# Patient Record
Sex: MALE | Race: OTHER | Employment: UNEMPLOYED | URBAN - METROPOLITAN AREA
[De-identification: names, ages, dates, MRNs, and addresses within clinical notes are randomized per-mention and may not be internally consistent; named-entity substitution may affect disease eponyms.]

---

## 2018-02-05 ENCOUNTER — HOSPITAL ENCOUNTER (EMERGENCY)
Age: 38
Discharge: HOME | End: 2018-02-05
Attending: EMERGENCY MEDICINE
Payer: MEDICAID

## 2018-02-05 VITALS
DIASTOLIC BLOOD PRESSURE: 98 MMHG | SYSTOLIC BLOOD PRESSURE: 175 MMHG | OXYGEN SATURATION: 98 % | HEART RATE: 98 BPM | RESPIRATION RATE: 21 BRPM | TEMPERATURE: 98.8 F

## 2018-02-05 DIAGNOSIS — R52 BODY ACHES: ICD-10-CM

## 2018-02-05 DIAGNOSIS — I10 HYPERTENSION, UNSPECIFIED TYPE: ICD-10-CM

## 2018-02-05 DIAGNOSIS — J06.9 UPPER RESPIRATORY TRACT INFECTION, UNSPECIFIED TYPE: ICD-10-CM

## 2018-02-05 DIAGNOSIS — F17.200 SMOKER: ICD-10-CM

## 2018-02-05 DIAGNOSIS — J34.89 STUFFY AND RUNNY NOSE: Primary | ICD-10-CM

## 2018-02-05 PROCEDURE — 99283 EMERGENCY DEPT VISIT LOW MDM: CPT

## 2018-02-05 PROCEDURE — 6370000000 HC RX 637 (ALT 250 FOR IP): Performed by: EMERGENCY MEDICINE

## 2018-02-05 RX ORDER — IBUPROFEN 800 MG/1
800 TABLET ORAL ONCE
Status: COMPLETED | OUTPATIENT
Start: 2018-02-05 | End: 2018-02-05

## 2018-02-05 RX ORDER — IBUPROFEN 600 MG/1
600 TABLET ORAL EVERY 6 HOURS PRN
Qty: 30 TABLET | Refills: 0 | Status: SHIPPED | OUTPATIENT
Start: 2018-02-05 | End: 2018-05-18 | Stop reason: ALTCHOICE

## 2018-02-05 RX ORDER — ACETAMINOPHEN 325 MG/1
650 TABLET ORAL ONCE
Status: COMPLETED | OUTPATIENT
Start: 2018-02-05 | End: 2018-02-05

## 2018-02-05 RX ORDER — ACETAMINOPHEN 325 MG/1
650 TABLET ORAL EVERY 6 HOURS PRN
Qty: 30 TABLET | Refills: 0 | Status: ON HOLD | OUTPATIENT
Start: 2018-02-05 | End: 2021-03-03

## 2018-02-05 RX ORDER — ECHINACEA PURPUREA EXTRACT 125 MG
1 TABLET ORAL PRN
Qty: 1 BOTTLE | Refills: 0 | Status: ON HOLD | OUTPATIENT
Start: 2018-02-05 | End: 2021-03-03

## 2018-02-05 RX ORDER — OXYMETAZOLINE HYDROCHLORIDE 0.05 G/100ML
2 SPRAY NASAL 2 TIMES DAILY
Qty: 1 BOTTLE | Refills: 0 | Status: SHIPPED | OUTPATIENT
Start: 2018-02-05 | End: 2018-03-07

## 2018-02-05 RX ADMIN — ACETAMINOPHEN 650 MG: 325 TABLET ORAL at 20:24

## 2018-02-05 RX ADMIN — IBUPROFEN 800 MG: 800 TABLET ORAL at 20:25

## 2018-02-05 ASSESSMENT — PAIN DESCRIPTION - DESCRIPTORS: DESCRIPTORS: ACHING

## 2018-02-05 ASSESSMENT — PAIN SCALES - GENERAL
PAINLEVEL_OUTOF10: 5

## 2018-02-05 ASSESSMENT — PAIN DESCRIPTION - LOCATION: LOCATION: GENERALIZED

## 2018-02-06 NOTE — ED PROVIDER NOTES
[facility]  ED  Emergency Department Encounter  Emergency Medicine Resident     Pt Name: Verlene Lesches  MRN: [id number]  [place] 1980  Date of evaluation: 2/5/18  PCP:  Bladimir Riojas MD    [address] [place] [address]       Chief Complaint   Patient presents with    Generalized Body Aches     Since Friday    Cough    Nasal Congestion    Fatigue       HISTORY OF PRESENT ILLNESS  (Location/Symptom, Timing/Onset, Context/Setting, Quality, Duration, Modifying Factors, Severity.)      Verlene Lesches is a 40 y.o. male who presents with acute onset of a constant URI symptoms  for the past day. Nothing taken. Nothing else has aggravated or relieved symptoms. Moderate severity. Associated or not associated symptoms: (+ is present/positive, - is absent/negative)  Fever possible stated he was sweating while he was sleeping and does complain of some chills  Cough + that is slighlty productive    Stuffy and runny nose +  Sore Throat  slight  Runny Eyes  Matted eyes this morning  Vomiting   none  Diarrhea   none  Headache   +  Body Aches   +    PAST MEDICAL / SURGICAL / SOCIAL / FAMILY HISTORY      has a past medical history of Hypertension. Patient denies history of hypertension to me     has a past surgical history that includes Total shoulder arthroplasty and Knee arthroscopy. Social History     Social History    Marital status: Single     Spouse name: N/A    Number of children: N/A    Years of education: N/A     Occupational History    Not on file. Social History Main Topics    Smoking status: Current Some Day Smoker    Smokeless tobacco: Not on file    Alcohol use Yes    Drug use: No    Sexual activity: Not on file     Other Topics Concern    Not on file     Social History Narrative    No narrative on file       History reviewed. No pertinent family history. Allergies:  Review of patient's allergies indicates no known allergies.     Home Medications:  Prior to Admission call the primary care provider listed on your discharge instructions or a physician of your choice this week to arrange follow up for further evaluation of possible pre-hypertension or Hypertension. PROCEDURES:  None    CONSULTS:  IP CONSULT TO SOCIAL WORK    CRITICAL CARE:  None    FINAL IMPRESSION      1. Stuffy and runny nose    2. Hypertension, unspecified type    3. Smoker    4. Body aches    5. Upper respiratory tract infection, unspecified type          DISPOSITION / PLAN     DISPOSITION Decision To Discharge         PATIENT REFERRED TO:  No follow-up provider specified.     DISCHARGE MEDICATIONS:  New Prescriptions    ACETAMINOPHEN (TYLENOL) 325 MG TABLET    Take 2 tablets by mouth every 6 hours as needed for Pain    IBUPROFEN (ADVIL;MOTRIN) 600 MG TABLET    Take 1 tablet by mouth every 6 hours as needed for Pain    OXYMETAZOLINE (AFRIN 12 HOUR) 0.05 % NASAL SPRAY    2 sprays by Nasal route 2 times daily No more than three days    SODIUM CHLORIDE (OCEAN NASAL SPRAY) 0.65 % NASAL SPRAY    1 spray by Nasal route as needed for Margy Hernandez MD  Emergency Medicine Resident    (Please note that portions of this note were completed with a voice recognition program.  Efforts were made to edit the dictations but occasionally words are mis-transcribed.)        Silvina Rich MD  Resident  02/05/18 2010

## 2018-02-06 NOTE — ED PROVIDER NOTES
[facility]     Emergency Department     Faculty Attestation        I performed a history and physical examination of the patient and discussed management with the resident. I reviewed the residents note and agree with the documented findings and plan of care. Any areas of disagreement are noted on the chart. I was personally present for the key portions of any procedures. I have documented in the chart those procedures where I was not present during the key portions. I have reviewed the emergency nurses triage note. I agree with the chief complaint, past medical history, past surgical history, allergies, medications, social and family history as documented unless otherwise noted below. For Physician Assistant/ Nurse Practitioner cases/documentation I have personally evaluated this patient and have completed at least one if not all key elements of the E/M (history, physical exam, and MDM). Additional findings are as noted. Vital Signs: BP: (!) 175/98  Pulse: 98  Resp: 21  Temp: 98.8 °F (37.1 °C) SpO2: 98 %  PCP:  Azucena Dukes MD    Pertinent Comments:         Critical Care  None    Note, if the patient's blood pressure was elevated, and they have no history of hypertension, they were informed of the following: The patient may have Pre-hypertension/Hypertension: The patient has been informed that they may have pre-hypertension or Hypertension based on a blood pressure reading in the emergency department. I recommend that the patient call the primary care provider listed on their discharge instructions or a physician of their choice this week to arrange follow up for further evaluation of possible pre-hypertension or Hypertension. (Please note that portions of this note were completed with a voice recognition program. Efforts were made to edit the dictations but occasionally words are mis-transcribed.  Whenever words are used in this note in

## 2018-05-14 ENCOUNTER — TELEPHONE (OUTPATIENT)
Dept: FAMILY MEDICINE CLINIC | Age: 38
End: 2018-05-14

## 2018-05-15 ENCOUNTER — APPOINTMENT (OUTPATIENT)
Dept: ULTRASOUND IMAGING | Age: 38
End: 2018-05-15
Payer: MEDICAID

## 2018-05-15 ENCOUNTER — HOSPITAL ENCOUNTER (EMERGENCY)
Age: 38
Discharge: HOME | End: 2018-05-15
Attending: EMERGENCY MEDICINE
Payer: MEDICAID

## 2018-05-15 VITALS
HEART RATE: 88 BPM | HEIGHT: 72 IN | RESPIRATION RATE: 18 BRPM | OXYGEN SATURATION: 96 % | DIASTOLIC BLOOD PRESSURE: 117 MMHG | WEIGHT: 248 LBS | SYSTOLIC BLOOD PRESSURE: 179 MMHG | TEMPERATURE: 98.3 F | BODY MASS INDEX: 33.59 KG/M2

## 2018-05-15 DIAGNOSIS — I10 ESSENTIAL HYPERTENSION: ICD-10-CM

## 2018-05-15 DIAGNOSIS — R36.1 HEMATOSPERMIA: Primary | ICD-10-CM

## 2018-05-15 LAB
-: ABNORMAL
ABSOLUTE EOS #: 0.1 K/UL (ref 0–0.4)
ABSOLUTE IMMATURE GRANULOCYTE: ABNORMAL K/UL (ref 0–0.3)
ABSOLUTE LYMPH #: 2.7 K/UL (ref 1–4.8)
ABSOLUTE MONO #: 0.6 K/UL (ref 0.2–0.8)
AMORPHOUS: ABNORMAL
ANION GAP SERPL CALCULATED.3IONS-SCNC: 13 MMOL/L (ref 9–17)
BACTERIA: ABNORMAL
BASOPHILS # BLD: 1 % (ref 0–2)
BASOPHILS ABSOLUTE: 0 K/UL (ref 0–0.2)
BILIRUBIN URINE: NEGATIVE
BUN BLDV-MCNC: 5 MG/DL (ref 6–20)
BUN/CREAT BLD: 7 (ref 9–20)
CALCIUM SERPL-MCNC: 8.8 MG/DL (ref 8.6–10.4)
CASTS UA: ABNORMAL /LPF
CHLORIDE BLD-SCNC: 103 MMOL/L (ref 98–107)
CO2: 24 MMOL/L (ref 20–31)
COLOR: YELLOW
COMMENT UA: ABNORMAL
CREAT SERPL-MCNC: 0.73 MG/DL (ref 0.7–1.2)
CRYSTALS, UA: ABNORMAL /HPF
DIFFERENTIAL TYPE: ABNORMAL
EOSINOPHILS RELATIVE PERCENT: 1 % (ref 1–4)
EPITHELIAL CELLS UA: ABNORMAL /HPF (ref 0–5)
GFR AFRICAN AMERICAN: >60 ML/MIN
GFR NON-AFRICAN AMERICAN: >60 ML/MIN
GFR SERPL CREATININE-BSD FRML MDRD: ABNORMAL ML/MIN/{1.73_M2}
GFR SERPL CREATININE-BSD FRML MDRD: ABNORMAL ML/MIN/{1.73_M2}
GLUCOSE BLD-MCNC: 94 MG/DL (ref 70–99)
GLUCOSE URINE: NEGATIVE
HCT VFR BLD CALC: 49 % (ref 41–53)
HEMOGLOBIN: 15.7 G/DL (ref 13.5–17.5)
IMMATURE GRANULOCYTES: ABNORMAL %
KETONES, URINE: NEGATIVE
LEUKOCYTE ESTERASE, URINE: NEGATIVE
LYMPHOCYTES # BLD: 32 % (ref 24–44)
MCH RBC QN AUTO: 26.6 PG (ref 26–34)
MCHC RBC AUTO-ENTMCNC: 32.1 G/DL (ref 31–37)
MCV RBC AUTO: 83.1 FL (ref 80–100)
MONOCYTES # BLD: 7 % (ref 1–7)
MUCUS: ABNORMAL
NITRITE, URINE: NEGATIVE
NRBC AUTOMATED: ABNORMAL PER 100 WBC
OTHER OBSERVATIONS UA: ABNORMAL
PDW BLD-RTO: 15.2 % (ref 11.5–14.5)
PH UA: 6.5 (ref 5–8)
PLATELET # BLD: 239 K/UL (ref 130–400)
PLATELET ESTIMATE: ABNORMAL
PMV BLD AUTO: 8 FL (ref 6–12)
POTASSIUM SERPL-SCNC: 4.1 MMOL/L (ref 3.7–5.3)
PROTEIN UA: NEGATIVE
RBC # BLD: 5.89 M/UL (ref 4.5–5.9)
RBC # BLD: ABNORMAL 10*6/UL
RBC UA: ABNORMAL /HPF (ref 0–2)
RENAL EPITHELIAL, UA: ABNORMAL /HPF
SEG NEUTROPHILS: 59 % (ref 36–66)
SEGMENTED NEUTROPHILS ABSOLUTE COUNT: 5.2 K/UL (ref 1.8–7.7)
SODIUM BLD-SCNC: 140 MMOL/L (ref 135–144)
SPECIFIC GRAVITY UA: 1.01 (ref 1–1.03)
TRICHOMONAS: ABNORMAL
TURBIDITY: CLEAR
URINE HGB: NEGATIVE
UROBILINOGEN, URINE: ABNORMAL
WBC # BLD: 8.6 K/UL (ref 3.5–11)
WBC # BLD: ABNORMAL 10*3/UL
WBC UA: ABNORMAL /HPF (ref 0–5)
YEAST: ABNORMAL

## 2018-05-15 PROCEDURE — 80048 BASIC METABOLIC PNL TOTAL CA: CPT

## 2018-05-15 PROCEDURE — 81001 URINALYSIS AUTO W/SCOPE: CPT

## 2018-05-15 PROCEDURE — 93976 VASCULAR STUDY: CPT

## 2018-05-15 PROCEDURE — 6370000000 HC RX 637 (ALT 250 FOR IP): Performed by: PHYSICIAN ASSISTANT

## 2018-05-15 PROCEDURE — 87591 N.GONORRHOEAE DNA AMP PROB: CPT

## 2018-05-15 PROCEDURE — 85025 COMPLETE CBC W/AUTO DIFF WBC: CPT

## 2018-05-15 PROCEDURE — 76870 US EXAM SCROTUM: CPT

## 2018-05-15 PROCEDURE — 87491 CHLMYD TRACH DNA AMP PROBE: CPT

## 2018-05-15 PROCEDURE — 99284 EMERGENCY DEPT VISIT MOD MDM: CPT

## 2018-05-15 RX ORDER — CLONIDINE HYDROCHLORIDE 0.1 MG/1
0.2 TABLET ORAL ONCE
Status: COMPLETED | OUTPATIENT
Start: 2018-05-15 | End: 2018-05-15

## 2018-05-15 RX ORDER — AMLODIPINE BESYLATE 10 MG/1
10 TABLET ORAL DAILY
Qty: 30 TABLET | Refills: 0 | Status: SHIPPED | OUTPATIENT
Start: 2018-05-15 | End: 2018-05-18

## 2018-05-15 RX ORDER — CIPROFLOXACIN 500 MG/1
500 TABLET, FILM COATED ORAL 2 TIMES DAILY
Qty: 20 TABLET | Refills: 0 | Status: SHIPPED | OUTPATIENT
Start: 2018-05-15 | End: 2018-05-18 | Stop reason: ALTCHOICE

## 2018-05-15 RX ORDER — DOXYCYCLINE HYCLATE 100 MG
100 TABLET ORAL 2 TIMES DAILY
Qty: 20 TABLET | Refills: 0 | Status: SHIPPED | OUTPATIENT
Start: 2018-05-15 | End: 2018-05-18 | Stop reason: ALTCHOICE

## 2018-05-15 RX ADMIN — CLONIDINE HYDROCHLORIDE 0.2 MG: 0.1 TABLET ORAL at 20:15

## 2018-05-16 LAB
C. TRACHOMATIS DNA ,URINE: NEGATIVE
N. GONORRHOEAE DNA, URINE: NEGATIVE

## 2018-05-18 ENCOUNTER — OFFICE VISIT (OUTPATIENT)
Dept: FAMILY MEDICINE CLINIC | Age: 38
End: 2018-05-18
Payer: MEDICAID

## 2018-05-18 VITALS
SYSTOLIC BLOOD PRESSURE: 140 MMHG | HEIGHT: 72 IN | HEART RATE: 83 BPM | DIASTOLIC BLOOD PRESSURE: 100 MMHG | BODY MASS INDEX: 33.07 KG/M2 | OXYGEN SATURATION: 97 % | WEIGHT: 244.13 LBS

## 2018-05-18 DIAGNOSIS — G89.29 CHRONIC PAIN OF LEFT KNEE: ICD-10-CM

## 2018-05-18 DIAGNOSIS — R86.8 LOW VOLUME OF EJACULATED SEMEN: ICD-10-CM

## 2018-05-18 DIAGNOSIS — M25.552 PAIN OF LEFT HIP JOINT: ICD-10-CM

## 2018-05-18 DIAGNOSIS — F17.200 SMOKER: Chronic | ICD-10-CM

## 2018-05-18 DIAGNOSIS — R36.1 HEMATOSPERMIA: ICD-10-CM

## 2018-05-18 DIAGNOSIS — I10 ESSENTIAL HYPERTENSION: Primary | Chronic | ICD-10-CM

## 2018-05-18 DIAGNOSIS — F12.90 MARIJUANA SMOKER: Chronic | ICD-10-CM

## 2018-05-18 DIAGNOSIS — M25.562 CHRONIC PAIN OF LEFT KNEE: ICD-10-CM

## 2018-05-18 PROCEDURE — G8427 DOCREV CUR MEDS BY ELIG CLIN: HCPCS | Performed by: FAMILY MEDICINE

## 2018-05-18 PROCEDURE — G8417 CALC BMI ABV UP PARAM F/U: HCPCS | Performed by: FAMILY MEDICINE

## 2018-05-18 PROCEDURE — 99204 OFFICE O/P NEW MOD 45 MIN: CPT | Performed by: FAMILY MEDICINE

## 2018-05-18 PROCEDURE — 4004F PT TOBACCO SCREEN RCVD TLK: CPT | Performed by: FAMILY MEDICINE

## 2018-05-18 RX ORDER — DOXYCYCLINE HYCLATE 100 MG
100 TABLET ORAL 2 TIMES DAILY
Status: ON HOLD | COMMUNITY
End: 2021-03-03

## 2018-05-18 RX ORDER — CIPROFLOXACIN 500 MG/1
500 TABLET, FILM COATED ORAL 2 TIMES DAILY
Status: ON HOLD | COMMUNITY
End: 2021-03-03 | Stop reason: ALTCHOICE

## 2018-05-18 RX ORDER — DICLOFENAC SODIUM 75 MG/1
75 TABLET, DELAYED RELEASE ORAL 2 TIMES DAILY WITH MEALS
Qty: 60 TABLET | Refills: 11 | Status: ON HOLD | OUTPATIENT
Start: 2018-05-18 | End: 2021-03-03

## 2018-05-18 RX ORDER — ENALAPRIL MALEATE AND HYDROCHLOROTHIAZIDE 10; 25 MG/1; MG/1
1 TABLET ORAL DAILY
Qty: 30 TABLET | Refills: 11 | Status: ON HOLD | OUTPATIENT
Start: 2018-05-18 | End: 2021-03-03 | Stop reason: RX

## 2018-05-18 ASSESSMENT — PATIENT HEALTH QUESTIONNAIRE - PHQ9
1. LITTLE INTEREST OR PLEASURE IN DOING THINGS: 0
2. FEELING DOWN, DEPRESSED OR HOPELESS: 0
SUM OF ALL RESPONSES TO PHQ9 QUESTIONS 1 & 2: 0
SUM OF ALL RESPONSES TO PHQ QUESTIONS 1-9: 0

## 2018-05-25 DIAGNOSIS — R86.8 LOW VOLUME OF EJACULATED SEMEN: ICD-10-CM

## 2018-05-25 DIAGNOSIS — R36.1 HEMATOSPERMIA: Primary | ICD-10-CM

## 2021-03-02 ENCOUNTER — HOSPITAL ENCOUNTER (INPATIENT)
Age: 41
LOS: 3 days | Discharge: OTHER INSTITUTION | DRG: 281 | End: 2021-03-05
Attending: EMERGENCY MEDICINE | Admitting: FAMILY MEDICINE
Payer: MEDICAID

## 2021-03-02 ENCOUNTER — APPOINTMENT (OUTPATIENT)
Dept: GENERAL RADIOLOGY | Age: 41
DRG: 281 | End: 2021-03-02

## 2021-03-02 ENCOUNTER — APPOINTMENT (OUTPATIENT)
Dept: CT IMAGING | Age: 41
DRG: 281 | End: 2021-03-02

## 2021-03-02 DIAGNOSIS — I50.9 CONGESTIVE HEART FAILURE, UNSPECIFIED HF CHRONICITY, UNSPECIFIED HEART FAILURE TYPE (HCC): ICD-10-CM

## 2021-03-02 DIAGNOSIS — I42.8 NON-ISCHEMIC CARDIOMYOPATHY (HCC): ICD-10-CM

## 2021-03-02 DIAGNOSIS — I21.4 NSTEMI (NON-ST ELEVATED MYOCARDIAL INFARCTION) (HCC): ICD-10-CM

## 2021-03-02 DIAGNOSIS — I31.39 PERICARDIAL EFFUSION: Primary | ICD-10-CM

## 2021-03-02 DIAGNOSIS — R91.8 LUNG NODULES: ICD-10-CM

## 2021-03-02 PROBLEM — R94.31 PROLONGED Q-T INTERVAL ON ECG: Status: ACTIVE | Noted: 2021-03-02

## 2021-03-02 PROBLEM — J90 BILATERAL PLEURAL EFFUSION: Status: ACTIVE | Noted: 2021-03-02

## 2021-03-02 PROBLEM — R59.0 MEDIASTINAL ADENOPATHY: Status: ACTIVE | Noted: 2021-03-02

## 2021-03-02 PROBLEM — I77.810 MILD DILATION OF ASCENDING AORTA (HCC): Status: ACTIVE | Noted: 2021-03-02

## 2021-03-02 PROBLEM — F17.200 CURRENT EVERY DAY SMOKER: Status: ACTIVE | Noted: 2018-05-18

## 2021-03-02 PROBLEM — E66.09 CLASS 2 OBESITY DUE TO EXCESS CALORIES WITHOUT SERIOUS COMORBIDITY WITH BODY MASS INDEX (BMI) OF 35.0 TO 35.9 IN ADULT: Status: ACTIVE | Noted: 2021-03-02

## 2021-03-02 PROBLEM — R77.8 ELEVATED TROPONIN: Status: ACTIVE | Noted: 2021-03-02

## 2021-03-02 LAB
ABSOLUTE EOS #: 0.09 K/UL (ref 0–0.44)
ABSOLUTE IMMATURE GRANULOCYTE: 0.02 K/UL (ref 0–0.3)
ABSOLUTE LYMPH #: 2.31 K/UL (ref 1.1–3.7)
ABSOLUTE MONO #: 0.56 K/UL (ref 0.1–1.2)
ALBUMIN SERPL-MCNC: 4.2 G/DL (ref 3.5–5.2)
ALBUMIN/GLOBULIN RATIO: NORMAL (ref 1–2.5)
ALP BLD-CCNC: 93 U/L (ref 40–129)
ALT SERPL-CCNC: 26 U/L (ref 5–41)
ANION GAP SERPL CALCULATED.3IONS-SCNC: 12 MMOL/L (ref 9–17)
AST SERPL-CCNC: 21 U/L
BASOPHILS # BLD: 0 % (ref 0–2)
BASOPHILS ABSOLUTE: <0.03 K/UL (ref 0–0.2)
BILIRUB SERPL-MCNC: 0.81 MG/DL (ref 0.3–1.2)
BILIRUBIN DIRECT: 0.21 MG/DL
BILIRUBIN, INDIRECT: 0.6 MG/DL (ref 0–1)
BNP INTERPRETATION: ABNORMAL
BUN BLDV-MCNC: 7 MG/DL (ref 6–20)
BUN/CREAT BLD: 9 (ref 9–20)
CA 125: 12 U/ML
CA 19-9: 4 U/ML (ref 0–35)
CALCIUM SERPL-MCNC: 9 MG/DL (ref 8.6–10.4)
CARCINOEMBRYONIC ANTIGEN: 2 NG/ML
CHLORIDE BLD-SCNC: 104 MMOL/L (ref 98–107)
CO2: 22 MMOL/L (ref 20–31)
CREAT SERPL-MCNC: 0.8 MG/DL (ref 0.7–1.2)
D-DIMER QUANTITATIVE: 0.85 MG/L FEU (ref 0–0.59)
DIFFERENTIAL TYPE: ABNORMAL
EOSINOPHILS RELATIVE PERCENT: 1 % (ref 1–4)
GFR AFRICAN AMERICAN: >60 ML/MIN
GFR NON-AFRICAN AMERICAN: >60 ML/MIN
GFR SERPL CREATININE-BSD FRML MDRD: ABNORMAL ML/MIN/{1.73_M2}
GFR SERPL CREATININE-BSD FRML MDRD: ABNORMAL ML/MIN/{1.73_M2}
GLOBULIN: NORMAL G/DL (ref 1.5–3.8)
GLUCOSE BLD-MCNC: 112 MG/DL (ref 70–99)
HCT VFR BLD CALC: 46.2 % (ref 40.7–50.3)
HEMOGLOBIN: 14.7 G/DL (ref 13–17)
IMMATURE GRANULOCYTES: 0 %
LYMPHOCYTES # BLD: 31 % (ref 24–43)
MCH RBC QN AUTO: 27.7 PG (ref 25.2–33.5)
MCHC RBC AUTO-ENTMCNC: 31.8 G/DL (ref 28.4–34.8)
MCV RBC AUTO: 87 FL (ref 82.6–102.9)
MONOCYTES # BLD: 8 % (ref 3–12)
MYOGLOBIN: 31 NG/ML (ref 28–72)
NRBC AUTOMATED: 0 PER 100 WBC
PDW BLD-RTO: 15 % (ref 11.8–14.4)
PLATELET # BLD: 240 K/UL (ref 138–453)
PLATELET ESTIMATE: ABNORMAL
PMV BLD AUTO: 10.4 FL (ref 8.1–13.5)
POTASSIUM SERPL-SCNC: 4 MMOL/L (ref 3.7–5.3)
PRO-BNP: 3129 PG/ML
RBC # BLD: 5.31 M/UL (ref 4.21–5.77)
RBC # BLD: ABNORMAL 10*6/UL
SEG NEUTROPHILS: 60 % (ref 36–65)
SEGMENTED NEUTROPHILS ABSOLUTE COUNT: 4.48 K/UL (ref 1.5–8.1)
SODIUM BLD-SCNC: 138 MMOL/L (ref 135–144)
TOTAL PROTEIN: 6.6 G/DL (ref 6.4–8.3)
TROPONIN INTERP: ABNORMAL
TROPONIN T: ABNORMAL NG/ML
TROPONIN, HIGH SENSITIVITY: 37 NG/L (ref 0–22)
TROPONIN, HIGH SENSITIVITY: 40 NG/L (ref 0–22)
TROPONIN, HIGH SENSITIVITY: 40 NG/L (ref 0–22)
TROPONIN, HIGH SENSITIVITY: 47 NG/L (ref 0–22)
WBC # BLD: 7.5 K/UL (ref 3.5–11.3)
WBC # BLD: ABNORMAL 10*3/UL

## 2021-03-02 PROCEDURE — 6360000002 HC RX W HCPCS: Performed by: NURSE PRACTITIONER

## 2021-03-02 PROCEDURE — 2580000003 HC RX 258: Performed by: EMERGENCY MEDICINE

## 2021-03-02 PROCEDURE — 84484 ASSAY OF TROPONIN QUANT: CPT

## 2021-03-02 PROCEDURE — 83874 ASSAY OF MYOGLOBIN: CPT

## 2021-03-02 PROCEDURE — 85379 FIBRIN DEGRADATION QUANT: CPT

## 2021-03-02 PROCEDURE — 36415 COLL VENOUS BLD VENIPUNCTURE: CPT

## 2021-03-02 PROCEDURE — 6370000000 HC RX 637 (ALT 250 FOR IP): Performed by: NURSE PRACTITIONER

## 2021-03-02 PROCEDURE — 71260 CT THORAX DX C+: CPT

## 2021-03-02 PROCEDURE — 80048 BASIC METABOLIC PNL TOTAL CA: CPT

## 2021-03-02 PROCEDURE — 93005 ELECTROCARDIOGRAM TRACING: CPT | Performed by: EMERGENCY MEDICINE

## 2021-03-02 PROCEDURE — 83880 ASSAY OF NATRIURETIC PEPTIDE: CPT

## 2021-03-02 PROCEDURE — 86038 ANTINUCLEAR ANTIBODIES: CPT

## 2021-03-02 PROCEDURE — 85025 COMPLETE CBC W/AUTO DIFF WBC: CPT

## 2021-03-02 PROCEDURE — 82378 CARCINOEMBRYONIC ANTIGEN: CPT

## 2021-03-02 PROCEDURE — 6360000004 HC RX CONTRAST MEDICATION: Performed by: EMERGENCY MEDICINE

## 2021-03-02 PROCEDURE — 99283 EMERGENCY DEPT VISIT LOW MDM: CPT

## 2021-03-02 PROCEDURE — 1200000000 HC SEMI PRIVATE

## 2021-03-02 PROCEDURE — 6370000000 HC RX 637 (ALT 250 FOR IP): Performed by: EMERGENCY MEDICINE

## 2021-03-02 PROCEDURE — 86301 IMMUNOASSAY TUMOR CA 19-9: CPT

## 2021-03-02 PROCEDURE — 99222 1ST HOSP IP/OBS MODERATE 55: CPT | Performed by: NURSE PRACTITIONER

## 2021-03-02 PROCEDURE — 86304 IMMUNOASSAY TUMOR CA 125: CPT

## 2021-03-02 PROCEDURE — 2580000003 HC RX 258: Performed by: NURSE PRACTITIONER

## 2021-03-02 PROCEDURE — 80076 HEPATIC FUNCTION PANEL: CPT

## 2021-03-02 PROCEDURE — 71046 X-RAY EXAM CHEST 2 VIEWS: CPT

## 2021-03-02 RX ORDER — ENALAPRIL MALEATE 10 MG/1
10 TABLET ORAL DAILY
Status: DISCONTINUED | OUTPATIENT
Start: 2021-03-02 | End: 2021-03-05 | Stop reason: HOSPADM

## 2021-03-02 RX ORDER — POTASSIUM CHLORIDE 7.45 MG/ML
10 INJECTION INTRAVENOUS PRN
Status: DISCONTINUED | OUTPATIENT
Start: 2021-03-02 | End: 2021-03-05 | Stop reason: HOSPADM

## 2021-03-02 RX ORDER — ATORVASTATIN CALCIUM 40 MG/1
40 TABLET, FILM COATED ORAL NIGHTLY
Status: DISCONTINUED | OUTPATIENT
Start: 2021-03-02 | End: 2021-03-05 | Stop reason: HOSPADM

## 2021-03-02 RX ORDER — MAGNESIUM SULFATE 1 G/100ML
1000 INJECTION INTRAVENOUS PRN
Status: DISCONTINUED | OUTPATIENT
Start: 2021-03-02 | End: 2021-03-05 | Stop reason: HOSPADM

## 2021-03-02 RX ORDER — PROMETHAZINE HYDROCHLORIDE 12.5 MG/1
12.5 TABLET ORAL EVERY 6 HOURS PRN
Status: DISCONTINUED | OUTPATIENT
Start: 2021-03-02 | End: 2021-03-05 | Stop reason: HOSPADM

## 2021-03-02 RX ORDER — HYDROCHLOROTHIAZIDE 25 MG/1
25 TABLET ORAL DAILY
Status: DISCONTINUED | OUTPATIENT
Start: 2021-03-02 | End: 2021-03-05

## 2021-03-02 RX ORDER — ONDANSETRON 2 MG/ML
4 INJECTION INTRAMUSCULAR; INTRAVENOUS EVERY 6 HOURS PRN
Status: DISCONTINUED | OUTPATIENT
Start: 2021-03-02 | End: 2021-03-05 | Stop reason: HOSPADM

## 2021-03-02 RX ORDER — BENZONATATE 100 MG/1
100 CAPSULE ORAL ONCE
Status: COMPLETED | OUTPATIENT
Start: 2021-03-02 | End: 2021-03-02

## 2021-03-02 RX ORDER — ACETAMINOPHEN 325 MG/1
650 TABLET ORAL EVERY 6 HOURS PRN
Status: DISCONTINUED | OUTPATIENT
Start: 2021-03-02 | End: 2021-03-04 | Stop reason: SDUPTHER

## 2021-03-02 RX ORDER — SODIUM CHLORIDE 0.9 % (FLUSH) 0.9 %
10 SYRINGE (ML) INJECTION EVERY 12 HOURS SCHEDULED
Status: DISCONTINUED | OUTPATIENT
Start: 2021-03-02 | End: 2021-03-03 | Stop reason: SDUPTHER

## 2021-03-02 RX ORDER — ENALAPRIL MALEATE AND HYDROCHLOROTHIAZIDE 10; 25 MG/1; MG/1
1 TABLET ORAL DAILY
Status: DISCONTINUED | OUTPATIENT
Start: 2021-03-02 | End: 2021-03-02 | Stop reason: CLARIF

## 2021-03-02 RX ORDER — ACETAMINOPHEN 650 MG/1
650 SUPPOSITORY RECTAL EVERY 6 HOURS PRN
Status: DISCONTINUED | OUTPATIENT
Start: 2021-03-02 | End: 2021-03-05 | Stop reason: HOSPADM

## 2021-03-02 RX ORDER — ALBUTEROL SULFATE 2.5 MG/3ML
2.5 SOLUTION RESPIRATORY (INHALATION) EVERY 4 HOURS PRN
Status: DISCONTINUED | OUTPATIENT
Start: 2021-03-02 | End: 2021-03-02

## 2021-03-02 RX ORDER — NITROGLYCERIN 0.4 MG/1
0.4 TABLET SUBLINGUAL EVERY 5 MIN PRN
Status: DISCONTINUED | OUTPATIENT
Start: 2021-03-02 | End: 2021-03-05 | Stop reason: HOSPADM

## 2021-03-02 RX ORDER — POTASSIUM CHLORIDE 20 MEQ/1
40 TABLET, EXTENDED RELEASE ORAL PRN
Status: DISCONTINUED | OUTPATIENT
Start: 2021-03-02 | End: 2021-03-05 | Stop reason: HOSPADM

## 2021-03-02 RX ORDER — SODIUM CHLORIDE 0.9 % (FLUSH) 0.9 %
10 SYRINGE (ML) INJECTION ONCE
Status: COMPLETED | OUTPATIENT
Start: 2021-03-02 | End: 2021-03-02

## 2021-03-02 RX ORDER — 0.9 % SODIUM CHLORIDE 0.9 %
80 INTRAVENOUS SOLUTION INTRAVENOUS ONCE
Status: COMPLETED | OUTPATIENT
Start: 2021-03-02 | End: 2021-03-02

## 2021-03-02 RX ORDER — ALBUTEROL SULFATE 90 UG/1
2 AEROSOL, METERED RESPIRATORY (INHALATION) EVERY 6 HOURS PRN
Status: DISCONTINUED | OUTPATIENT
Start: 2021-03-02 | End: 2021-03-05 | Stop reason: HOSPADM

## 2021-03-02 RX ORDER — POTASSIUM CHLORIDE 7.45 MG/ML
10 INJECTION INTRAVENOUS PRN
Status: DISCONTINUED | OUTPATIENT
Start: 2021-03-02 | End: 2021-03-02 | Stop reason: SDUPTHER

## 2021-03-02 RX ORDER — SODIUM CHLORIDE 0.9 % (FLUSH) 0.9 %
10 SYRINGE (ML) INJECTION PRN
Status: DISCONTINUED | OUTPATIENT
Start: 2021-03-02 | End: 2021-03-03 | Stop reason: SDUPTHER

## 2021-03-02 RX ADMIN — ENALAPRIL MALEATE 10 MG: 10 TABLET ORAL at 18:16

## 2021-03-02 RX ADMIN — ATORVASTATIN CALCIUM 40 MG: 40 TABLET, FILM COATED ORAL at 20:55

## 2021-03-02 RX ADMIN — Medication 10 ML: at 20:55

## 2021-03-02 RX ADMIN — IOPAMIDOL 75 ML: 755 INJECTION, SOLUTION INTRAVENOUS at 15:31

## 2021-03-02 RX ADMIN — BENZONATATE 100 MG: 100 CAPSULE ORAL at 13:22

## 2021-03-02 RX ADMIN — SODIUM CHLORIDE 80 ML: 9 INJECTION, SOLUTION INTRAVENOUS at 15:31

## 2021-03-02 RX ADMIN — HYDROCHLOROTHIAZIDE 25 MG: 25 TABLET ORAL at 18:16

## 2021-03-02 RX ADMIN — ASPIRIN 325 MG: 325 TABLET, COATED ORAL at 18:16

## 2021-03-02 RX ADMIN — ENOXAPARIN SODIUM 120 MG: 150 INJECTION SUBCUTANEOUS at 20:55

## 2021-03-02 RX ADMIN — Medication 10 ML: at 15:31

## 2021-03-02 ASSESSMENT — ENCOUNTER SYMPTOMS
VOMITING: 0
EYE REDNESS: 0
TROUBLE SWALLOWING: 0
SHORTNESS OF BREATH: 1
ABDOMINAL PAIN: 0
BACK PAIN: 0
COUGH: 1

## 2021-03-02 ASSESSMENT — PAIN SCALES - GENERAL
PAINLEVEL_OUTOF10: 7
PAINLEVEL_OUTOF10: 8

## 2021-03-02 ASSESSMENT — PAIN DESCRIPTION - FREQUENCY: FREQUENCY: CONTINUOUS

## 2021-03-02 ASSESSMENT — PAIN DESCRIPTION - LOCATION: LOCATION: CHEST

## 2021-03-02 NOTE — PROGRESS NOTES
CT spoke with writer regarding rders for CT abdomen and pelvis with contrast. Since patient just had CT chest with contrast, they need to wait 24hrs until they can do another CT. Abdomen and pelvis will be done tomorrow afternoon. Provider notified.

## 2021-03-02 NOTE — ED PROVIDER NOTES
EMERGENCY DEPARTMENT ENCOUNTER    Pt Name: Laura Castillo  MRN: [id number]  [place] 1980  Date of evaluation: 3/2/21  CHIEF COMPLAINT       Chief Complaint   Patient presents with    Shortness of Breath     onset 1 week    Cough     HISTORY OF PRESENT ILLNESS   Patient is a 61-year-old male here with cough and shortness of breath. He states he had it for a little over a week. He states he has intermittent weight thin phlegm production, he states he did notice a streak of blood in it in the morning but it was after coughing excessively. Denies any other symptoms no fevers no chills no sore throat no myalgias. He states he has had some soreness to his lower anterior ribs and upper abdomen presumably from coughing he states. Denies any chest pain. No history of PE DVT recent surgeries leg swelling or prolonged immobilization. He states his shortness of breath is with exertion, denies any orthopnea. REVIEW OF SYSTEMS     Review of Systems   Constitutional: Negative for fever. HENT: Negative for trouble swallowing. Eyes: Negative for redness. Respiratory: Positive for cough and shortness of breath. Cardiovascular: Negative for chest pain. Gastrointestinal: Negative for abdominal pain and vomiting. Genitourinary: Negative for difficulty urinating. Musculoskeletal: Negative for back pain and neck stiffness. Skin: Negative for rash. Neurological: Negative for seizures. Psychiatric/Behavioral: Negative for confusion.      PASTMEDICAL HISTORY     Past Medical History:   Diagnosis Date    Hypertension     on 5/15/18 pt states he has never had medication prescribed     SURGICAL HISTORY       Past Surgical History:   Procedure Laterality Date    KNEE ARTHROSCOPY Left 2009    [facility]    SHOULDER ARTHROPLASTY Left 1995    [facility]    TESTICLE REMOVAL Left 2014     CURRENT MEDICATIONS       Previous Medications    ACETAMINOPHEN (TYLENOL) 325 MG TABLET    Take 2 tablets by mouth every 6 rhythm. Pulses: Normal pulses. Heart sounds: No murmur. Pulmonary:      Effort: Pulmonary effort is normal. No respiratory distress. Breath sounds: Normal breath sounds. Abdominal:      General: There is no distension. Palpations: Abdomen is soft. There is no mass. Tenderness: There is no abdominal tenderness. Hernia: No hernia is present. Musculoskeletal: Normal range of motion. General: No deformity. Right lower leg: No edema. Left lower leg: No edema. Skin:     General: Skin is warm and dry. Capillary Refill: Capillary refill takes less than 2 seconds. Neurological:      General: No focal deficit present. Mental Status: He is alert and oriented to person, place, and time. Cranial Nerves: No cranial nerve deficit. Psychiatric:         Thought Content: Thought content normal.         MEDICAL DECISION MAKING:          Please see ED Course below for MDM/ED course. DDx: Infection, heart failure, PE    All patient's question's and concerns were answered prior to disposition and patient and/or family expressed understanding and agreement of treatment plan. CRITICAL CARE:   CRITICAL CARE: There was a high probability of clinically significant/life threatening deterioration in this patient's condition which required my urgent intervention. Total critical care time was 35 minutes. This excludes any time for separately reportable procedures.         NIH STROKE SCALE:            PROCEDURES:    Procedures    DIAGNOSTIC RESULTS   EKG:All EKG's are interpreted by the Emergency Department Physician who either signs or Co-signs this chart in the absence of a cardiologist.    Normal sinus rhythm rate of 100  QRS prolonged at 102 QTC prolonged at 508, left axis, no ST elevations or depressions, T wave inversions laterally, inferiorly and anteriorly, Q waves anteriorly anterior infarct age undetermined, abnormal EKG    RADIOLOGY:All plain film, CT, MRI, and formal ultrasound images (except ED bedside ultrasound) are read by the radiologist, see reports below, unless otherwisenoted in MDM or here. CT CHEST PULMONARY EMBOLISM W CONTRAST   Final Result   No evidence of pulmonary embolism. Enlarged main pulmonary artery measuring   3.7 cm. Query underlying pulmonary hypertension. Numerous randomly distributed small solid noncalcified pulmonary nodules, the   largest measuring up to 7 mm, highly suspicious for pulmonary metastatic   disease. Although the primary is unknown, correlate for prior history of   testicular cancer in the setting of previous left orchiectomy. Pulmonary interstitial thickening which is somewhat irregular in appearance,   and as such suspicious for possible lymphangitic carcinomatosis, though   interstitial edema is a differential consideration. Small right and trace left pleural effusions. Abnormal mediastinal and hilar lymphadenopathy suspicious for phillip   metastatic disease. Cardiomegaly with a moderate pericardial effusion. Ascending aorta prominent at 4.5 cm. Descending thoracic aortic   atherosclerotic disease, advanced for age. Suggestion of possible left hydronephrosis as there is moderate left   perinephric stranding at the superior pole left kidney. Correlate for any   abdominal pain and consider dedicated abdominal imaging to evaluate for any   retroperitoneal adenopathy or mass. XR CHEST (2 VW)   Final Result   Cardiomegaly with mild interstitial pulmonary edema. Pericardial effusion   should also be considered. RECOMMENDATION:   Cardiology consultation and correlation with echocardiography. Probable tiny   right pleural effusion. No large effusion. LABS: All lab results were reviewed by myself, and all abnormals are listed below.   Labs Reviewed   CBC WITH AUTO DIFFERENTIAL - Abnormal; Notable for the following components:       Result Value    RDW 15.0 (*)     All other components within normal limits   BASIC METABOLIC PANEL - Abnormal; Notable for the following components:    Glucose 112 (*)     All other components within normal limits   D-DIMER, QUANTITATIVE - Abnormal; Notable for the following components:    D-Dimer, Quant 0.85 (*)     All other components within normal limits   BRAIN NATRIURETIC PEPTIDE - Abnormal; Notable for the following components:    Pro-BNP 3,129 (*)     All other components within normal limits   TROP/MYOGLOBIN - Abnormal; Notable for the following components:    Troponin, High Sensitivity 37 (*)     All other components within normal limits   TROPONIN - Abnormal; Notable for the following components:    Troponin, High Sensitivity 40 (*)     All other components within normal limits       EMERGENCY DEPARTMENTCOURSE:     Patient is a 78-year-old male here with shortness of breath and cough worsening over a week as well as hemoptysis. Unable to use PERC criteria. He is in no distress he is 96% on room air. His lungs are clear. Will check labs D-dimer troponin chest x-ray EKG and treat his cough. Patient's work-up significant for elevated troponin, elevated BNP. D-dimer elevated CT chest was completed there is no PE but there is enlarged pulmonary artery, multiple lung nodules, pericardial effusion, interstitial thickening and lymphadenopathy concerning for possible metastatic process. There is also hydronephrosis on the left. I spoke with Rika Wade from [facility] who will admit the patient for cardiac evaluation and metastatic work-up. He states he will start Lovenox given the T wave inversions and elevated troponins and have cardiology evaluate. Patient updated on plan.     Vitals:    Vitals:    03/02/21 1231 03/02/21 1236   BP:  (!) 152/108   Pulse: 103    Resp: 16    Temp: 98.4 °F (36.9 °C)    TempSrc: Oral    SpO2: 96%    Weight: 255 lb 8 oz (115.9 kg)    Height: 5' 11\" (1.803 m)        The patient was given the following medications while in the emergency department:  Orders Placed This Encounter   Medications    benzonatate (TESSALON) capsule 100 mg    0.9 % sodium chloride bolus    iopamidol (ISOVUE-370) 76 % injection 75 mL    sodium chloride flush 0.9 % injection 10 mL     CONSULTS:  IP CONSULT TO HOSPITALIST    FINAL IMPRESSION      1. Pericardial effusion    2. Lung nodules    3. NSTEMI (non-ST elevated myocardial infarction) ([facility] [address]    4. Congestive heart failure, unspecified HF chronicity, unspecified heart failure type ([facility] [address]          DISPOSITION/PLAN   DISPOSITION  Decision to admit      PATIENT REFERRED TO:  No follow-up provider specified. DISCHARGE MEDICATIONS:  New Prescriptions    No medications on file     Eleni Merritt MD  Attending Emergency Physician    This note was created with the assistance of a speech-recognition program. While intending to generate a document that actually reflects the content of the visit, no guarantees can be provided that every mistake has been identified and corrected by editing.                    Eleni Merritt MD  03/02/21 2346

## 2021-03-03 ENCOUNTER — APPOINTMENT (OUTPATIENT)
Dept: CT IMAGING | Age: 41
DRG: 281 | End: 2021-03-03

## 2021-03-03 LAB
ALBUMIN SERPL-MCNC: 3.6 G/DL (ref 3.5–5.2)
ALBUMIN/GLOBULIN RATIO: ABNORMAL (ref 1–2.5)
ALP BLD-CCNC: 84 U/L (ref 40–129)
ALT SERPL-CCNC: 20 U/L (ref 5–41)
ANION GAP SERPL CALCULATED.3IONS-SCNC: 10 MMOL/L (ref 9–17)
AST SERPL-CCNC: 17 U/L
BILIRUB SERPL-MCNC: 0.56 MG/DL (ref 0.3–1.2)
BUN BLDV-MCNC: 11 MG/DL (ref 6–20)
BUN/CREAT BLD: 10 (ref 9–20)
C-REACTIVE PROTEIN: <3 MG/L (ref 0–5)
CALCIUM SERPL-MCNC: 8.7 MG/DL (ref 8.6–10.4)
CHLORIDE BLD-SCNC: 104 MMOL/L (ref 98–107)
CHOLESTEROL/HDL RATIO: 4.1
CHOLESTEROL: 179 MG/DL
CO2: 24 MMOL/L (ref 20–31)
CREAT SERPL-MCNC: 1.05 MG/DL (ref 0.7–1.2)
EKG ATRIAL RATE: 100 BPM
EKG P AXIS: 23 DEGREES
EKG P-R INTERVAL: 162 MS
EKG Q-T INTERVAL: 394 MS
EKG QRS DURATION: 102 MS
EKG QTC CALCULATION (BAZETT): 508 MS
EKG R AXIS: -40 DEGREES
EKG T AXIS: 67 DEGREES
EKG VENTRICULAR RATE: 100 BPM
GFR AFRICAN AMERICAN: >60 ML/MIN
GFR NON-AFRICAN AMERICAN: >60 ML/MIN
GFR SERPL CREATININE-BSD FRML MDRD: ABNORMAL ML/MIN/{1.73_M2}
GFR SERPL CREATININE-BSD FRML MDRD: ABNORMAL ML/MIN/{1.73_M2}
GLUCOSE BLD-MCNC: 127 MG/DL (ref 70–99)
HCT VFR BLD CALC: 42.8 % (ref 40.7–50.3)
HDLC SERPL-MCNC: 44 MG/DL
HEMOGLOBIN: 13.7 G/DL (ref 13–17)
LDL CHOLESTEROL: 102 MG/DL (ref 0–130)
LV EF: 20 %
LVEF MODALITY: NORMAL
MAGNESIUM: 1.8 MG/DL (ref 1.6–2.6)
MCH RBC QN AUTO: 27.4 PG (ref 25.2–33.5)
MCHC RBC AUTO-ENTMCNC: 32 G/DL (ref 28.4–34.8)
MCV RBC AUTO: 85.6 FL (ref 82.6–102.9)
NRBC AUTOMATED: 0 PER 100 WBC
PDW BLD-RTO: 15 % (ref 11.8–14.4)
PLATELET # BLD: 229 K/UL (ref 138–453)
PMV BLD AUTO: 10.8 FL (ref 8.1–13.5)
POTASSIUM SERPL-SCNC: 3.7 MMOL/L (ref 3.7–5.3)
RBC # BLD: 5 M/UL (ref 4.21–5.77)
SEDIMENTATION RATE, ERYTHROCYTE: 1 MM (ref 0–15)
SODIUM BLD-SCNC: 138 MMOL/L (ref 135–144)
TOTAL PROTEIN: 6.3 G/DL (ref 6.4–8.3)
TRIGL SERPL-MCNC: 166 MG/DL
VLDLC SERPL CALC-MCNC: ABNORMAL MG/DL (ref 1–30)
WBC # BLD: 7.7 K/UL (ref 3.5–11.3)

## 2021-03-03 PROCEDURE — 6360000002 HC RX W HCPCS: Performed by: NURSE PRACTITIONER

## 2021-03-03 PROCEDURE — 6370000000 HC RX 637 (ALT 250 FOR IP): Performed by: NURSE PRACTITIONER

## 2021-03-03 PROCEDURE — 93005 ELECTROCARDIOGRAM TRACING: CPT | Performed by: NURSE PRACTITIONER

## 2021-03-03 PROCEDURE — 85652 RBC SED RATE AUTOMATED: CPT

## 2021-03-03 PROCEDURE — 93306 TTE W/DOPPLER COMPLETE: CPT

## 2021-03-03 PROCEDURE — 80053 COMPREHEN METABOLIC PANEL: CPT

## 2021-03-03 PROCEDURE — 6360000004 HC RX CONTRAST MEDICATION: Performed by: NURSE PRACTITIONER

## 2021-03-03 PROCEDURE — 83735 ASSAY OF MAGNESIUM: CPT

## 2021-03-03 PROCEDURE — 80061 LIPID PANEL: CPT

## 2021-03-03 PROCEDURE — 74177 CT ABD & PELVIS W/CONTRAST: CPT

## 2021-03-03 PROCEDURE — 36415 COLL VENOUS BLD VENIPUNCTURE: CPT

## 2021-03-03 PROCEDURE — 85027 COMPLETE CBC AUTOMATED: CPT

## 2021-03-03 PROCEDURE — 6360000002 HC RX W HCPCS: Performed by: FAMILY MEDICINE

## 2021-03-03 PROCEDURE — 99233 SBSQ HOSP IP/OBS HIGH 50: CPT | Performed by: FAMILY MEDICINE

## 2021-03-03 PROCEDURE — 2580000003 HC RX 258: Performed by: NURSE PRACTITIONER

## 2021-03-03 PROCEDURE — 99255 IP/OBS CONSLTJ NEW/EST HI 80: CPT | Performed by: INTERNAL MEDICINE

## 2021-03-03 PROCEDURE — 6370000000 HC RX 637 (ALT 250 FOR IP): Performed by: FAMILY MEDICINE

## 2021-03-03 PROCEDURE — 86140 C-REACTIVE PROTEIN: CPT

## 2021-03-03 PROCEDURE — 1200000000 HC SEMI PRIVATE

## 2021-03-03 RX ORDER — SODIUM CHLORIDE 0.9 % (FLUSH) 0.9 %
10 SYRINGE (ML) INJECTION EVERY 12 HOURS SCHEDULED
Status: DISCONTINUED | OUTPATIENT
Start: 2021-03-03 | End: 2021-03-04 | Stop reason: SDUPTHER

## 2021-03-03 RX ORDER — CARVEDILOL 12.5 MG/1
12.5 TABLET ORAL 2 TIMES DAILY WITH MEALS
Status: DISCONTINUED | OUTPATIENT
Start: 2021-03-03 | End: 2021-03-05 | Stop reason: HOSPADM

## 2021-03-03 RX ORDER — 0.9 % SODIUM CHLORIDE 0.9 %
80 INTRAVENOUS SOLUTION INTRAVENOUS ONCE
Status: COMPLETED | OUTPATIENT
Start: 2021-03-03 | End: 2021-03-03

## 2021-03-03 RX ORDER — SODIUM CHLORIDE 9 MG/ML
INJECTION, SOLUTION INTRAVENOUS CONTINUOUS
Status: DISCONTINUED | OUTPATIENT
Start: 2021-03-03 | End: 2021-03-03

## 2021-03-03 RX ORDER — SODIUM CHLORIDE 9 MG/ML
INJECTION, SOLUTION INTRAVENOUS CONTINUOUS
Status: DISCONTINUED | OUTPATIENT
Start: 2021-03-04 | End: 2021-03-04

## 2021-03-03 RX ORDER — FUROSEMIDE 10 MG/ML
40 INJECTION INTRAMUSCULAR; INTRAVENOUS DAILY
Status: DISCONTINUED | OUTPATIENT
Start: 2021-03-03 | End: 2021-03-05

## 2021-03-03 RX ORDER — SODIUM CHLORIDE 0.9 % (FLUSH) 0.9 %
10 SYRINGE (ML) INJECTION PRN
Status: DISCONTINUED | OUTPATIENT
Start: 2021-03-03 | End: 2021-03-04 | Stop reason: SDUPTHER

## 2021-03-03 RX ORDER — FUROSEMIDE 10 MG/ML
20 INJECTION INTRAMUSCULAR; INTRAVENOUS ONCE
Status: DISCONTINUED | OUTPATIENT
Start: 2021-03-03 | End: 2021-03-03

## 2021-03-03 RX ADMIN — ENALAPRIL MALEATE 10 MG: 10 TABLET ORAL at 05:04

## 2021-03-03 RX ADMIN — SODIUM CHLORIDE 80 ML: 9 INJECTION, SOLUTION INTRAVENOUS at 15:42

## 2021-03-03 RX ADMIN — Medication 10 ML: at 15:42

## 2021-03-03 RX ADMIN — FUROSEMIDE 40 MG: 10 INJECTION, SOLUTION INTRAVENOUS at 15:57

## 2021-03-03 RX ADMIN — ATORVASTATIN CALCIUM 40 MG: 40 TABLET, FILM COATED ORAL at 21:57

## 2021-03-03 RX ADMIN — HYDROCHLOROTHIAZIDE 25 MG: 25 TABLET ORAL at 05:04

## 2021-03-03 RX ADMIN — CARVEDILOL 12.5 MG: 12.5 TABLET, FILM COATED ORAL at 15:58

## 2021-03-03 RX ADMIN — ENOXAPARIN SODIUM 120 MG: 150 INJECTION SUBCUTANEOUS at 21:57

## 2021-03-03 RX ADMIN — IOHEXOL 30 ML: 300 INJECTION, SOLUTION INTRAVENOUS at 15:42

## 2021-03-03 RX ADMIN — Medication 10 ML: at 08:15

## 2021-03-03 RX ADMIN — CARVEDILOL 12.5 MG: 12.5 TABLET, FILM COATED ORAL at 08:13

## 2021-03-03 RX ADMIN — IOPAMIDOL 100 ML: 755 INJECTION, SOLUTION INTRAVENOUS at 15:42

## 2021-03-03 RX ADMIN — ASPIRIN 325 MG: 325 TABLET, COATED ORAL at 08:13

## 2021-03-03 RX ADMIN — ENOXAPARIN SODIUM 120 MG: 150 INJECTION SUBCUTANEOUS at 08:15

## 2021-03-03 ASSESSMENT — ENCOUNTER SYMPTOMS
BLOOD IN STOOL: 0
NAUSEA: 0
ABDOMINAL PAIN: 0
CHEST TIGHTNESS: 1
VOMITING: 0
SHORTNESS OF BREATH: 1
COUGH: 1
WHEEZING: 0
DIARRHEA: 0
CONSTIPATION: 0

## 2021-03-03 ASSESSMENT — PAIN SCALES - GENERAL: PAINLEVEL_OUTOF10: 1

## 2021-03-03 NOTE — PROGRESS NOTES
Transitions of Care Pharmacy Service   Medication History Note      The patient does not currently take any prescription or OTC maintenance medications at home. The Vaseretic 10/25mg that was on his med list at admission was an old prescription from 2018 -- he will need a new prescription to resume at discharge. Source(s) of information: patient, Epic      Please feel free to call me with any questions about this encounter. Thank you.     Danna Moon, [address] [place] [address]  Transitions of Care Pharmacy Service  Phone: [phone]  Fax: [phone]    Electronically signed by Danna Moon [address] [place] [address] on 3/3/2021 at 5:54 PM

## 2021-03-03 NOTE — CARE COORDINATION
Case Management Initial Discharge Plan  Dane Melton,         Readmission Risk              Risk of Unplanned Readmission:        9             Met with:patient to discuss discharge plans. Information verified: address, contacts, phone number, , insurance Yes  PCP: Dewey Marcelo MD  Date of last visit: years ago but plans o going back    Insurance Provider: taryn    Discharge Planning  Current Residence:  Apt,momlives with him  Living Arrangements:  Spouse/Significant Other, Children   Home has 0 stories/3-4 stairs to climb into apt  Support Systems:  Spouse/Significant Other, Children, Family Members  Current Services PTA:  na Agency: na  Patient able to perform ADL's:Independent  DME in home:  na  DME used to aid ambulation prior to admission:   na  DME used during admission:  na    Potential Assistance Needed:  N/A    Pharmacy: [facility]    Potential Assistance Purchasing Medications:  No  Does patient want to participate in local refill/ meds to beds program?  No    Patient agreeable to home care: No  [place] of choice provided:  n/a      Type of Home Care Services:  None  Patient expects to be discharged to:  home    Prior SNF/Rehab Placement and Facility: NO  Agreeable to SNF/Rehab: No  [place] of choice provided: n/a   Evaluation: n/a    Expected Discharge date:  21  Follow Up Appointment: Best Day/ Time: Monday AM    Transportation provider: Self,family  Transportation arrangements needed for discharge: No    Discharge Plan: Pt is from home (momlives with him). Independent, drives, on unemployment. No DME. No insurance-seen by [employer]. Will f/u with pcp. Pt does not have insurance-he has been laid off most of this past year dt covid currently receiving unemployment he relays the [employer] rep does not think he will qualify for Medicaid.        He has been working in a [employer] for about 4 years as a  and automotive before that but claims he did wear protective equipment. He intends to f/u with Dr. Zari Galdamez office and understands he may need to pay oop.      Electronically signed by Lynette Holder RN on 3/3/21 at 11:16 AM EST

## 2021-03-03 NOTE — CONSULTS
[id number] [facility] [address]                [address] [place] [id number]                                  CONSULTATION    PATIENT NAME: Ken Doshi                   :        1980  MED REC NO:   [id number]                             ROOM:       [id number]  ACCOUNT NO:   [de-identified]                           ADMIT DATE: 2021  PROVIDER:     Jhony Perez    DATE OF CONSULTATION:  2021    CARDIAC CONSULTATION    REASON FOR CONSULTATION:  Shortness of breath, congestive heart failure. CHIEF COMPLAINT:  Shortness of breath and fatigue. HISTORY OF PRESENT ILLNESS:  The patient is a 81-year-old male who was  admitted to the hospital with a chief complaint of shortness of breath. He also reported cough of 1 week in duration. The patient apparently  has history of longstanding alcohol use. He reports drinking two \"tall  cans\" of beer a day and has been doing so for over two decades. The  patient had an echocardiogram performed, which showed severe LV systolic  dysfunction; hence, Cardiology Service was consulted. The patient also  was noted to have elevated high sensitivity troponin. Upon my examination, the patient is somewhat short of breath at rest.   Denies any chest pain at this time. He is sitting up in his chair. He  reports having episodes of shortness of breath when he lays flat. His B-type natriuretic peptide was elevated at 3129 suggestive of  decompensated congestive heart failure, high sensitivity troponin was  40. PAST MEDICAL HISTORY:  1. Hypertension. 2.  History of testicular torsion, status post left orchiectomy in . PAST SURGICAL HISTORY:  Positive for knee arthroplasty, shoulder  arthroplasty, and testicular removal due to torsion. ALLERGIES:  No known drug allergies. SOCIAL HISTORY:  Positive for tobacco use. He has nine pack-year  history of smoking.   He drinks two \"tall cans\" of beer a day and has  been doing so for 20 years. Does use occasional marijuana. FAMILY HISTORY:  Reviewed and is negative for sudden cardiac death or  significant coronary artery disease. REVIEW OF SYSTEMS:  CONSTITUTIONAL:  Positive for fatigue. No fever, chills, rigors. EYES:  No blurred vision, double vision. ENT:  No ear discharge, nasal discharge, sore throat. RESPIRATORY:  Positive for shortness of breath with exertion and cough. CARDIOVASCULAR:  Positive for shortness of breath with exertion. GASTROINTESTINAL:  No nausea, vomiting, diarrhea. The patient reports  abdominal distention. GENITOURINARY:  No dysuria, frequency, hematuria. INTEGUMENT:  No skin rashes or easy bruising. MUSCULOSKELETAL:  No arthralgias. The patient does report some  generalized aches and pains. NEUROLOGIC:  No history of TIA or CVA. INTEGUMENT:  No skin rashes or easy bruising. Fifteen-point system review was performed and pertinent findings are as  noted. PHYSICAL EXAMINATION:  Currently shows the patient is comfortable at  bedrest.  VITAL SIGNS:  Blood pressure of 140/90, pulse rate of 90, respiratory  rate of 18. The patient is afebrile. Weight is 255 pounds. SpO2 is  97%. BMI 35.64 kg/m2. GENERAL APPEARANCE:  The patient is awake, alert and is in no apparent  distress. HEENT:  Cranium is atraumatic, normocephalic. There is no jugular  venous distention. Normal carotid upstrokes. No audible bruits. Sclera is anicteric. Oral mucosa is moist.  SKIN:  Warm and dry. HEART:  Examination of the heart reveals S1, S2. No S3 or S4.  LUNGS:  Clear. ABDOMEN:  Soft. Bowel sounds are present. EXTREMITIES:  Demonstrated no edema. Symmetric distal pulses are noted,  which are equal in volume bilaterally. A 12-lead EKG shows a sinus rhythm, left atrial abnormality and diffuse  anterolateral T-wave inversions. LABORATORY DATA:  Troponin is 40 and repeat troponin again is 40. D-dimer is 0.85.   BUN 7, creatinine 0.80.    IMPRESSION:  1. Acute systolic congestive heart failure. 2.  History of alcohol abuse. 3.  Tobacco use. 4.  History of orchiectomy for a torsion of testis. 5.  Essential hypertension. 6.  Bilateral pleural effusions. 7.  Pulmonary nodules. PLAN:  I recommend a cardiac catheterization to evaluate for etiology of  cardiomyopathy and guide further therapy. I discussed the procedure of  cardiac catheterization along with risks, benefits, alternatives. The  patient understands and agrees to proceed. The patient is currently  undergoing imaging studies for what was reported as multiple pulmonary  nodules and to guide further therapy. Thank you for allowing us to participate in the care of the patient, and  we will follow him with you and make further recommendations.         Yakelin Butler    D: 03/03/2021 14:38:56       T: 03/03/2021 14:46:38     [email or web address]  Job#: [id number]     Doc#: [id number]    CC:

## 2021-03-03 NOTE — PLAN OF CARE
Problem: Infection:  Goal: Will remain free from infection  Description: Will remain free from infection  3/3/2021 1127 by Selene Holguin RN  Outcome: Ongoing  3/2/2021 2244 by Luke Wells  Outcome: Ongoing     Problem: Safety:  Goal: Free from accidental physical injury  Description: Free from accidental physical injury  3/3/2021 1127 by Selene Holguin RN  Outcome: Ongoing  Note: Patient will remain free from injury. Call light and personal belongings within reach. Bed locked, in lowest position, side rails up x2. Non-skid socks in place. Hourly rounding implemented. Will continue to monitor.       Problem: Safety:  Goal: Free from intentional harm  Description: Free from intentional harm  3/3/2021 1127 by Selene Holguin RN  Outcome: Ongoing     Problem: Daily Care:  Goal: Daily care needs are met  Description: Daily care needs are met  3/3/2021 1127 by Selene Holguin RN  Outcome: Ongoing     Problem: Pain:  Goal: Patient's pain/discomfort is manageable  Description: Patient's pain/discomfort is manageable  3/3/2021 1127 by Selene Holguin RN  Outcome: Ongoing     Problem: Pain:  Goal: Pain level will decrease  Description: Pain level will decrease  3/3/2021 1127 by Selene Holguin RN  Outcome: Ongoing     Problem: Pain:  Goal: Control of acute pain  Description: Control of acute pain  3/3/2021 1127 by Selene Holguin RN  Outcome: Ongoing     Problem: Pain:  Goal: Control of chronic pain  Description: Control of chronic pain  3/3/2021 1127 by Selene Holguin RN  Outcome: Ongoing     Problem: Skin Integrity:  Goal: Skin integrity will stabilize  Description: Skin integrity will stabilize  3/3/2021 1127 by Selene Holguin RN  Outcome: Ongoing     Problem: Discharge Planning:  Goal: Patients continuum of care needs are met  Description: Patients continuum of care needs are met  3/3/2021 1127 by Selene Holguin RN  Outcome: Ongoing

## 2021-03-03 NOTE — H&P
[facility]  Office: [address], DO, Preethi Neal, DO, Megan Maker, DO, Hilda Alvares Blood, DO, Jacquie Small MD, Gt Jeter MD, Karen Heredia MD, Raffi Carnes MD, Antony Fofana MD, Carter Canales MD, Tulio Hutchins MD, Errol Hess MD, French Padilla MD, Pawel Guillermo DO, Chiquis Nino MD, Elena Fragoso DO, Ari Wilder MD,  Apolinar Lamb DO, Francois Ventura MD, Janeth Hernandez MD, Akila Romero CNP, [facility] Ru[facility], CNP, Wanda Velazquez, CNP, Gael Cedillo, CNS, Tonya Borges, CNP, Huong Ferreira, CNP, Sharon Blount, CNP, Lauryn García, CNP, Renée Ware, CNP, Tiesha Crawford PA-C, Clint Strauss, [facility], Veena Salazar, CNP, Sahil Pathak, CNP, Nieves Gonzalez, CNP, Karey Lamb, CNP, Garrett Erickson, CNP, Susan Estes, CNP         [address]    HISTORY AND PHYSICAL EXAMINATION            Date:   3/2/2021  Patient name:  Troy Egan  Date of admission:  3/2/2021 12:41 PM  MRN:   [id number]  Account:  [de-identified]  YOB: 1980  PCP:    Flower Muro MD  Room:   1012/1012-02  Code Status:    Full Code    Chief Complaint:     Chief Complaint   Patient presents with    Shortness of Breath     onset 1 week    Cough       History Obtained From:     patient, electronic medical record    History of Present Illness:     Troy Egan is a 45 y.o. / male who presents with Shortness of Breath (onset 1 week) and Cough   and is admitted to the hospital for the management of Elevated troponin. This is a pleasant 61-year-old male with past medical history notable for left testicular torsion s/p orchiectomy (2012) and hypertension who presented to the emergency room with cough and shortness of breath of several weeks duration. Mr. Giacomo Munoz reports that for several weeks he has experienced episodes of significant shortness of breath with \"coughing fits\" that typically worsen at night.   He has history of cigarette smoking and occasional marijuana smoking and has been trying to cut back on both. He has attempted to self medicate with drinking herbal tea and had some relief with using a friend's nebulizer treatment. He reports that his girlfriend has noticed that he wakes the middle of the night \"gasping for air\" and will typically progress to a coughing spell. Ultimately, he presented today because last evening he reports that he was awake every 2 hours with shortness of breath and inability to sleep. He endorses intermittent clear/white sputum production and had one episode with pink-tinged sputum after an exceptionally harsh coughing episode the night prior. He denies chest pain, fever, chills, sore throat, nausea/vomiting/change in bowel or bladder habits, no myalgias or lymph node enlargement. He endorses \"soreness\" to his lower ribs and upper abdomen which he attributes to persistent coughing. Additionally he states that over the past 3 months he notices that he has lost muscle mass in his arms and legs while experiencing increasing abdominal distention. He denies any changes in his appetite or early satiety    On presentation, afebrile mildly tachycardic, hypertensive with oxygen saturation 95% and greater on room air. ED course notable for work blood work demonstrating elevated high-sensitivity troponin of 37- 40, elevated proBNP 3,129, and elevated D-dimer 0.85. Twelve-lead EKG demonstrated sinus rhythm at 100 bpm, QTC prolonged at 508 ms, no ST elevation or depression, T wave inversions concerning for lateral ischemia, anterior leads concerning for possible anterior infarct, age undetermined. Portable CXR demonstrated cardiomegaly with mild interstitial pulmonary edema.      CTA of the chest was pursued demonstrating enlarged main pulmonary artery ~ 3.7 cm, numerous randomly distributed small solid noncalcified pulmonary nodules suspicious for pulmonary metastatic disease, pulmonary interstitial thickening suspicious for possible lymphangitic carcinomatosis, bilateral pleural effusions right greater than left, mediastinal and hilar lymphadenopathy suspicious for phillip metastatic disease, dilated ascending aorta ~ 4.5 cm, and cardiomegaly with moderate pericardial effusion. Perinephric stranding at the superior pole of the left kidney suggestive of possible left hydronephrosis. On my exam, Mr. Kun Mccrary is on room air, able to lie flat, and converse in full sentences. He has received  therapeutic Lovenox, full dose aspirin and statin in the setting of elevated troponin. He denies chest pain, however reports continued abdominal and intercostal soreness which she attributes to persistent coughing. No JVD on exam, inspiratory rales right posterior base. Past Medical History:     Past Medical History:   Diagnosis Date    Hypertension     on 5/15/18 pt states he has never had medication prescribed    Testicular torsion 2012    s/p Left orchiectomy        Past Surgical History:     Past Surgical History:   Procedure Laterality Date    KNEE ARTHROSCOPY Left 2009    [facility]    SHOULDER ARTHROPLASTY Left 1995    [facility]    TESTICLE REMOVAL Left 2012        Medications Prior to Admission:     Prior to Admission medications    Medication Sig Start Date End Date Taking?  Authorizing Provider   doxycycline hyclate (VIBRA-TABS) 100 MG tablet Take 100 mg by mouth 2 times daily    Historical Provider, MD   ciprofloxacin (CIPRO) 500 MG tablet Take 500 mg by mouth 2 times daily    Historical Provider, MD   diclofenac (VOLTAREN) 75 MG EC tablet Take 1 tablet by mouth 2 times daily (with meals) 5/18/18   Mickey Crisostomo MD   enalapril-hydrochlorothiazide (VASERETIC) 10-25 MG per tablet Take 1 tablet by mouth daily 5/18/18   Mickey Crisostomo MD   acetaminophen (TYLENOL) 325 MG tablet Take 2 tablets by mouth every 6 hours as needed for Pain 2/5/18   Annette Henley MD   sodium chloride (OCEAN NASAL SPRAY) 0.65 % nasal spray 1 spray by Nasal route as needed for Congestion 2/5/18   Ahmet Johansen MD        Allergies:     Patient has no known allergies. Social History:     Tobacco:    reports that he has been smoking cigarettes. He has a 9.00 pack-year smoking history. He has never used smokeless tobacco.  Alcohol:      reports current alcohol use of about 2.0 standard drinks of alcohol per week. Drug Use:  reports current drug use. Drug: Marijuana. Family History:     Family History   Problem Relation Age of Onset    Diabetes Maternal Grandmother     Stroke Neg Hx     Heart Disease Neg Hx     Cancer Neg Hx        Review of Systems:     Positive and Negative as described in HPI.     CONSTITUTIONAL:  negative for fevers, chills, sweats, fatigue,   HEENT:  negative for vision, hearing changes, runny nose, throat pain  RESPIRATORY: Positive for shortness of breath and cough  CARDIOVASCULAR:  negative for chest pain, palpitations  GASTROINTESTINAL:  negative for nausea, vomiting, diarrhea, constipation, change in bowel habits, abdominal pain, abdominal distention  GENITOURINARY:  negative for difficulty of urination, burning with urination, frequency   INTEGUMENT:  negative for rash, skin lesions, easy bruising   HEMATOLOGIC/LYMPHATIC:  negative for swelling/edema   ALLERGIC/IMMUNOLOGIC:  negative for urticaria , itching  ENDOCRINE:  negative increase in drinking, increase in urination, hot or cold intolerance  MUSCULOSKELETAL:  negative joint pains, muscle aches, swelling of joints, intercostal and abdominal muscle soreness, loss of muscle mass bilateral upper and lower extremities ~ 3 mos  NEUROLOGICAL:  negative for headaches, dizziness, lightheadedness, numbness, pain, tingling extremities  BEHAVIOR/PSYCH:  negative for depression, anxiety    Physical Exam:   BP (!) 148/99   Pulse 95   Temp 97.9 °F (36.6 °C) (Oral)   Resp 20   Ht 5' 11\" (1.803 m)   Wt 255 lb 8 oz (115.9 kg)   SpO2 97%   BMI 35.64 kg/m²   Temp (24hrs), Av.1 °F (36.7 °C), Min:97.9 °F (36.6 °C), Max:98.4 °F (36.9 °C)    No results for input(s): POCGLU in the last 72 hours. No intake or output data in the 24 hours ending 21 2322    General Appearance:  alert, well appearing, and in no acute distress  Mental status: oriented to person, place, and time  Head:  normocephalic, atraumatic  Eye: no icterus, redness, pupils equal and reactive, conjunctiva clear  Ear: normal external ear, no discharge, hearing intact  Nose:  no drainage noted  Mouth: mucous membranes moist  Neck: trachea midline, no JVD, no cervical lymphadenopathy  Lungs: Clear to auscultation anterior fields.   Inspiratory rales right posterior base  Cardiovascular: Heart tones distant, S1-S2 regular rate and rhythm, no murmur gallop or rub  Abdomen: Soft, nontender, nondistended, normal bowel sounds   Neurologic: There are no new focal motor or sensory deficits, normal muscle tone and bulk, no abnormal sensation, normal speech  Skin: No gross lesions, rashes, bruising or bleeding on exposed skin area  Extremities:  peripheral pulses palpable, no pedal edema or calf pain with palpation  Psych: normal affect     Investigations:      Laboratory Testing:  Recent Results (from the past 24 hour(s))   CBC with DIFF    Collection Time: 21  1:42 PM   Result Value Ref Range    WBC 7.5 3.5 - 11.3 k/uL    RBC 5.31 4.21 - 5.77 m/uL    Hemoglobin 14.7 13.0 - 17.0 g/dL    Hematocrit 46.2 40.7 - 50.3 %    MCV 87.0 82.6 - 102.9 fL    MCH 27.7 25.2 - 33.5 pg    MCHC 31.8 28.4 - 34.8 g/dL    RDW 15.0 (H) 11.8 - 14.4 %    Platelets 238 066 - 208 k/uL    MPV 10.4 8.1 - 13.5 fL    NRBC Automated 0.0 0.0 per 100 WBC    Differential Type NOT REPORTED     Seg Neutrophils 60 36 - 65 %    Lymphocytes 31 24 - 43 %    Monocytes 8 3 - 12 %    Eosinophils % 1 1 - 4 %    Basophils 0 0 - 2 %    Immature Granulocytes 0 0 %    Segs Absolute 4.48 1.50 - 8.10 k/uL    Absolute Lymph # 2.31 1.10 - 3.70 k/uL    Absolute Mono # 0.56 0.10 - 1.20 k/uL    Absolute Eos # 0.09 0.00 - 0.44 k/uL    Basophils Absolute <0.03 0.00 - 0.20 k/uL    Absolute Immature Granulocyte 0.02 0.00 - 0.30 k/uL    WBC Morphology NOT REPORTED     RBC Morphology ANISOCYTOSIS PRESENT     Platelet Estimate NOT REPORTED    Basic Metabolic Prof    Collection Time: 03/02/21  1:42 PM   Result Value Ref Range    Glucose 112 (H) 70 - 99 mg/dL    BUN 7 6 - 20 mg/dL    CREATININE 0.80 0.70 - 1.20 mg/dL    Bun/Cre Ratio 9 9 - 20    Calcium 9.0 8.6 - 10.4 mg/dL    Sodium 138 135 - 144 mmol/L    Potassium 4.0 3.7 - 5.3 mmol/L    Chloride 104 98 - 107 mmol/L    CO2 22 20 - 31 mmol/L    Anion Gap 12 9 - 17 mmol/L    GFR Non-African American >60 >60 mL/min    GFR African American >60 >60 mL/min    GFR Comment          GFR Staging NOT REPORTED    D-Dimer, Quantitative    Collection Time: 03/02/21  1:42 PM   Result Value Ref Range    D-Dimer, Quant 0.85 (H) 0.00 - 0.59 mg/L FEU   Brain Natriuretic Peptide    Collection Time: 03/02/21  1:42 PM   Result Value Ref Range    Pro-BNP 3,129 (H) <300 pg/mL    BNP Interpretation Pro-BNP Reference Range:    Trop/Myoglobin    Collection Time: 03/02/21  1:42 PM   Result Value Ref Range    Troponin, High Sensitivity 37 (H) 0 - 22 ng/L    Troponin T NOT REPORTED <0.03 ng/mL    Troponin Interp NOT REPORTED     Myoglobin 31 28 - 72 ng/mL   Hepatic Function Panel    Collection Time: 03/02/21  1:42 PM   Result Value Ref Range    Albumin 4.2 3.5 - 5.2 g/dL    Alkaline Phosphatase 93 40 - 129 U/L    ALT 26 5 - 41 U/L    AST 21 <40 U/L    Total Bilirubin 0.81 0.3 - 1.2 mg/dL    Bilirubin, Direct 0.21 <0.31 mg/dL    Bilirubin, Indirect 0.60 0.00 - 1.00 mg/dL    Total Protein 6.6 6.4 - 8.3 g/dL    Globulin NOT REPORTED 1.5 - 3.8 g/dL    Albumin/Globulin Ratio NOT REPORTED 1.0 - 2.5   EKG 12 Lead    Collection Time: 03/02/21  1:56 PM   Result Value Ref Range    Ventricular Rate 100 BPM    Atrial Rate 100 BPM    P-R Interval 162 ms QRS Duration 102 ms    Q-T Interval 394 ms    QTc Calculation (Bazett) 508 ms    P Axis 23 degrees    R Axis -40 degrees    T Axis 67 degrees   Troponin    Collection Time: 03/02/21  4:11 PM   Result Value Ref Range    Troponin, High Sensitivity 40 (H) 0 - 22 ng/L    Troponin T NOT REPORTED <0.03 ng/mL    Troponin Interp NOT REPORTED    Troponin    Collection Time: 03/02/21  7:39 PM   Result Value Ref Range    Troponin, High Sensitivity 40 (H) 0 - 22 ng/L    Troponin T NOT REPORTED <0.03 ng/mL    Troponin Interp NOT REPORTED    CEA    Collection Time: 03/02/21  7:39 PM   Result Value Ref Range    CEA 2.0 <3.9 ng/mL   CA 19-9    Collection Time: 03/02/21  7:39 PM   Result Value Ref Range    CA 19-9 4 0 - 35 U/mL   Troponin    Collection Time: 03/02/21 10:03 PM   Result Value Ref Range    Troponin, High Sensitivity 47 (H) 0 - 22 ng/L    Troponin T NOT REPORTED <0.03 ng/mL    Troponin Interp NOT REPORTED        Imaging/Diagnostics:  Xr Chest (2 Vw)    Result Date: 3/2/2021  Cardiomegaly with mild interstitial pulmonary edema. Pericardial effusion should also be considered. RECOMMENDATION: Cardiology consultation and correlation with echocardiography. Probable tiny right pleural effusion. No large effusion. Ct Chest Pulmonary Embolism W Contrast    Result Date: 3/2/2021  No evidence of pulmonary embolism. Enlarged main pulmonary artery measuring 3.7 cm. Query underlying pulmonary hypertension. Numerous randomly distributed small solid noncalcified pulmonary nodules, the largest measuring up to 7 mm, highly suspicious for pulmonary metastatic disease. Although the primary is unknown, correlate for prior history of testicular cancer in the setting of previous left orchiectomy. Pulmonary interstitial thickening which is somewhat irregular in appearance, and as such suspicious for possible lymphangitic carcinomatosis, though interstitial edema is a differential consideration.  Small right and trace left pleural effusions. Abnormal mediastinal and hilar lymphadenopathy suspicious for phillip metastatic disease. Cardiomegaly with a moderate pericardial effusion. Ascending aorta prominent at 4.5 cm. Descending thoracic aortic atherosclerotic disease, advanced for age. Suggestion of possible left hydronephrosis as there is moderate left perinephric stranding at the superior pole left kidney. Correlate for any abdominal pain and consider dedicated abdominal imaging to evaluate for any retroperitoneal adenopathy or mass. Assessment :      Hospital Problems           Last Modified POA    * (Principal) Elevated troponin 3/2/2021 Yes    Essential hypertension (Chronic) 3/2/2021 Yes    Current every day smoker 3/2/2021 Yes    Marijuana smoker (Chronic) 3/2/2021 Yes    Class 2 obesity due to excess calories without serious comorbidity with body mass index (BMI) of 35.0 to 35.9 in adult 3/2/2021 Yes    Pericardial effusion 3/2/2021 Yes    Bilateral pleural effusion 3/2/2021 Yes    Mediastinal adenopathy 3/2/2021 Yes    Mild dilation of ascending aorta ([address] 3/2/2021 Yes    Multiple pulmonary nodules 3/2/2021 Yes    Congestive heart failure ([address] 3/2/2021 Yes          Plan:     Patient status inpatient in the Progressive Unit/Step down    Imaging and laboratory values reviewed. Elevated troponin, elevated proBNP, CT chest with multiple findings including enlarged main pulmonary artery, multiple lung nodules, moderate pericardial effusion, bilateral pleural effusion, interstitial thickening and lymphadenopathy concerning for possible metastatic process. CT suggestive of possible hydronephrosis on the left, EGFR greater than 60 is reassuring. Tumor markers including CA 19-9, CEA, CA-125 levels pending. CT abdomen and pelvis with IV contrast to be completed 3/4 given weight loss with likely metastatic changes in the chest.   Serial troponins, twelve-lead EKG and surface echocardiogram in the a.m. Currently chest pain-free. Therapeutic Lovenox has been initiated. Full dose aspirin and statin. Continue home antihypertensive regimen: Vaseretic 10-25 mg daily. Cardiology consult, Dr Colton Christine to see in the AM  Currently on RA and in no acute distress. IV diuresis PRN  Nicotine dependence, marijuana use: Lifestyle modifications            Consultations:   IP CONSULT TO HOSPITALIST  IP CONSULT TO CARDIOLOGY     Patient is admitted as inpatient status because of co-morbidities listed above, severity of signs and symptoms as outlined, requirement for current medical therapies and most importantly because of direct risk to patient if care not provided in a hospital setting. Expected length of stay > 48 hours.     BILL Reilly NP  3/2/2021  11:22 PM    Copy sent to Dr. Ashlee Mary MD

## 2021-03-03 NOTE — PROGRESS NOTES
relief with using a friend's nebulizer treatment. He reports that his girlfriend has noticed that he wakes the middle of the night \"gasping for air\" and will typically progress to a coughing spell. Ultimately, he presented today because last evening he reports that he was awake every 2 hours with shortness of breath and inability to sleep. He endorses intermittent clear/white sputum production and had one episode with pink-tinged sputum after an exceptionally harsh coughing episode the night prior. He denies chest pain, fever, chills, sore throat, nausea/vomiting/change in bowel or bladder habits, no myalgias or lymph node enlargement. He endorses \"soreness\" to his lower ribs and upper abdomen which he attributes to persistent coughing. Additionally he states that over the past 3 months he notices that he has lost muscle mass in his arms and legs while experiencing increasing abdominal distention. He denies any changes in his appetite or early satiety  On presentation, afebrile mildly tachycardic, hypertensive with oxygen saturation 95% and greater on room air. ED course notable for work blood work demonstrating elevated high-sensitivity troponin of 37- 40, elevated proBNP 3,129, and elevated D-dimer 0.85. Twelve-lead EKG demonstrated sinus rhythm at 100 bpm, QTC prolonged at 508 ms, no ST elevation or depression, T wave inversions concerning for lateral ischemia, anterior leads concerning for possible anterior infarct, age undetermined. Portable CXR demonstrated cardiomegaly with mild interstitial pulmonary edema.    CTA of the chest was pursued demonstrating enlarged main pulmonary artery ~ 3.7 cm, numerous randomly distributed small solid noncalcified pulmonary nodules suspicious for pulmonary metastatic disease, pulmonary interstitial thickening suspicious for possible lymphangitic carcinomatosis, bilateral pleural effusions right greater than left, mediastinal and hilar lymphadenopathy suspicious for [id number], HCO3, NBEA, PBEA, BEART, BE, THGBART, THB, ZAT5GQL, [id number], [id number], O2SAT, FIO2  No results found for: SPECIAL  No results found for: CULTURE    Radiology:  Xr Chest (2 Vw)    Result Date: 3/2/2021  Cardiomegaly with mild interstitial pulmonary edema. Pericardial effusion should also be considered. RECOMMENDATION: Cardiology consultation and correlation with echocardiography. Probable tiny right pleural effusion. No large effusion. Ct Chest Pulmonary Embolism W Contrast    Result Date: 3/2/2021  No evidence of pulmonary embolism. Enlarged main pulmonary artery measuring 3.7 cm. Query underlying pulmonary hypertension. Numerous randomly distributed small solid noncalcified pulmonary nodules, the largest measuring up to 7 mm, highly suspicious for pulmonary metastatic disease. Although the primary is unknown, correlate for prior history of testicular cancer in the setting of previous left orchiectomy. Pulmonary interstitial thickening which is somewhat irregular in appearance, and as such suspicious for possible lymphangitic carcinomatosis, though interstitial edema is a differential consideration. Small right and trace left pleural effusions. Abnormal mediastinal and hilar lymphadenopathy suspicious for phillip metastatic disease. Cardiomegaly with a moderate pericardial effusion. Ascending aorta prominent at 4.5 cm. Descending thoracic aortic atherosclerotic disease, advanced for age. Suggestion of possible left hydronephrosis as there is moderate left perinephric stranding at the superior pole left kidney. Correlate for any abdominal pain and consider dedicated abdominal imaging to evaluate for any retroperitoneal adenopathy or mass. Physical Examination:        Physical Exam  Vitals signs and nursing note reviewed. Constitutional:       General: He is not in acute distress. HENT:      Head: Normocephalic and atraumatic.    Eyes:      Conjunctiva/sclera: Conjunctivae normal.

## 2021-03-04 LAB
ANION GAP SERPL CALCULATED.3IONS-SCNC: 12 MMOL/L (ref 9–17)
ANTI-NUCLEAR ANTIBODY (ANA): NEGATIVE
BUN BLDV-MCNC: 12 MG/DL (ref 6–20)
BUN/CREAT BLD: 13 (ref 9–20)
CALCIUM SERPL-MCNC: 9 MG/DL (ref 8.6–10.4)
CHLORIDE BLD-SCNC: 105 MMOL/L (ref 98–107)
CO2: 24 MMOL/L (ref 20–31)
CREAT SERPL-MCNC: 0.9 MG/DL (ref 0.7–1.2)
EKG ATRIAL RATE: 98 BPM
EKG P AXIS: 52 DEGREES
EKG P-R INTERVAL: 166 MS
EKG Q-T INTERVAL: 350 MS
EKG QRS DURATION: 100 MS
EKG QTC CALCULATION (BAZETT): 446 MS
EKG R AXIS: 109 DEGREES
EKG T AXIS: -21 DEGREES
EKG VENTRICULAR RATE: 98 BPM
GFR AFRICAN AMERICAN: >60 ML/MIN
GFR NON-AFRICAN AMERICAN: >60 ML/MIN
GFR SERPL CREATININE-BSD FRML MDRD: ABNORMAL ML/MIN/{1.73_M2}
GFR SERPL CREATININE-BSD FRML MDRD: ABNORMAL ML/MIN/{1.73_M2}
GLUCOSE BLD-MCNC: 115 MG/DL (ref 70–99)
LV EF: 15 %
LVEF MODALITY: NORMAL
POTASSIUM SERPL-SCNC: 4.2 MMOL/L (ref 3.7–5.3)
SARS-COV-2 ANTIBODY, TOTAL: NEGATIVE
SARS-COV-2, RAPID: NOT DETECTED
SODIUM BLD-SCNC: 141 MMOL/L (ref 135–144)
SPECIMEN DESCRIPTION: NORMAL

## 2021-03-04 PROCEDURE — C1725 CATH, TRANSLUMIN NON-LASER: HCPCS

## 2021-03-04 PROCEDURE — 6370000000 HC RX 637 (ALT 250 FOR IP): Performed by: FAMILY MEDICINE

## 2021-03-04 PROCEDURE — 86769 SARS-COV-2 COVID-19 ANTIBODY: CPT

## 2021-03-04 PROCEDURE — 2580000003 HC RX 258: Performed by: INTERNAL MEDICINE

## 2021-03-04 PROCEDURE — 80048 BASIC METABOLIC PNL TOTAL CA: CPT

## 2021-03-04 PROCEDURE — 6360000002 HC RX W HCPCS: Performed by: NURSE PRACTITIONER

## 2021-03-04 PROCEDURE — B2111ZZ FLUOROSCOPY OF MULTIPLE CORONARY ARTERIES USING LOW OSMOLAR CONTRAST: ICD-10-PCS | Performed by: INTERNAL MEDICINE

## 2021-03-04 PROCEDURE — B2151ZZ FLUOROSCOPY OF LEFT HEART USING LOW OSMOLAR CONTRAST: ICD-10-PCS | Performed by: INTERNAL MEDICINE

## 2021-03-04 PROCEDURE — 99232 SBSQ HOSP IP/OBS MODERATE 35: CPT | Performed by: INTERNAL MEDICINE

## 2021-03-04 PROCEDURE — C1760 CLOSURE DEV, VASC: HCPCS

## 2021-03-04 PROCEDURE — 6370000000 HC RX 637 (ALT 250 FOR IP): Performed by: NURSE PRACTITIONER

## 2021-03-04 PROCEDURE — 36415 COLL VENOUS BLD VENIPUNCTURE: CPT

## 2021-03-04 PROCEDURE — 6360000002 HC RX W HCPCS: Performed by: INTERNAL MEDICINE

## 2021-03-04 PROCEDURE — 4A023N8 MEASUREMENT OF CARDIAC SAMPLING AND PRESSURE, BILATERAL, PERCUTANEOUS APPROACH: ICD-10-PCS | Performed by: INTERNAL MEDICINE

## 2021-03-04 PROCEDURE — C1894 INTRO/SHEATH, NON-LASER: HCPCS

## 2021-03-04 PROCEDURE — 93460 R&L HRT ART/VENTRICLE ANGIO: CPT | Performed by: INTERNAL MEDICINE

## 2021-03-04 PROCEDURE — U0002 COVID-19 LAB TEST NON-CDC: HCPCS

## 2021-03-04 PROCEDURE — B3101ZZ FLUOROSCOPY OF THORACIC AORTA USING LOW OSMOLAR CONTRAST: ICD-10-PCS | Performed by: INTERNAL MEDICINE

## 2021-03-04 PROCEDURE — 99233 SBSQ HOSP IP/OBS HIGH 50: CPT | Performed by: FAMILY MEDICINE

## 2021-03-04 PROCEDURE — 6360000002 HC RX W HCPCS

## 2021-03-04 PROCEDURE — 2500000003 HC RX 250 WO HCPCS

## 2021-03-04 PROCEDURE — 6360000004 HC RX CONTRAST MEDICATION

## 2021-03-04 PROCEDURE — 1200000000 HC SEMI PRIVATE

## 2021-03-04 PROCEDURE — 2709999900 HC NON-CHARGEABLE SUPPLY

## 2021-03-04 RX ORDER — ACETAMINOPHEN 325 MG/1
650 TABLET ORAL EVERY 4 HOURS PRN
Status: DISCONTINUED | OUTPATIENT
Start: 2021-03-04 | End: 2021-03-05 | Stop reason: HOSPADM

## 2021-03-04 RX ORDER — FOLIC ACID 1 MG/1
1 TABLET ORAL DAILY
Status: DISCONTINUED | OUTPATIENT
Start: 2021-03-04 | End: 2021-03-05 | Stop reason: HOSPADM

## 2021-03-04 RX ORDER — LANOLIN ALCOHOL/MO/W.PET/CERES
100 CREAM (GRAM) TOPICAL DAILY
Status: DISCONTINUED | OUTPATIENT
Start: 2021-03-04 | End: 2021-03-04

## 2021-03-04 RX ORDER — FUROSEMIDE 10 MG/ML
40 INJECTION INTRAMUSCULAR; INTRAVENOUS ONCE
Status: COMPLETED | OUTPATIENT
Start: 2021-03-04 | End: 2021-03-04

## 2021-03-04 RX ORDER — SODIUM CHLORIDE 0.9 % (FLUSH) 0.9 %
10 SYRINGE (ML) INJECTION EVERY 12 HOURS SCHEDULED
Status: DISCONTINUED | OUTPATIENT
Start: 2021-03-04 | End: 2021-03-05 | Stop reason: HOSPADM

## 2021-03-04 RX ORDER — GAUZE BANDAGE 2" X 2"
100 BANDAGE TOPICAL DAILY
Status: DISCONTINUED | OUTPATIENT
Start: 2021-03-04 | End: 2021-03-05 | Stop reason: HOSPADM

## 2021-03-04 RX ORDER — SODIUM CHLORIDE 0.9 % (FLUSH) 0.9 %
10 SYRINGE (ML) INJECTION PRN
Status: DISCONTINUED | OUTPATIENT
Start: 2021-03-04 | End: 2021-03-05 | Stop reason: HOSPADM

## 2021-03-04 RX ADMIN — CARVEDILOL 12.5 MG: 12.5 TABLET, FILM COATED ORAL at 17:06

## 2021-03-04 RX ADMIN — HYDROCHLOROTHIAZIDE 25 MG: 25 TABLET ORAL at 11:30

## 2021-03-04 RX ADMIN — ATORVASTATIN CALCIUM 40 MG: 40 TABLET, FILM COATED ORAL at 21:11

## 2021-03-04 RX ADMIN — ENOXAPARIN SODIUM 120 MG: 150 INJECTION SUBCUTANEOUS at 21:11

## 2021-03-04 RX ADMIN — FOLIC ACID 1 MG: 1 TABLET ORAL at 14:08

## 2021-03-04 RX ADMIN — THIAMINE HCL TAB 100 MG 100 MG: 100 TAB at 14:08

## 2021-03-04 RX ADMIN — Medication 10 ML: at 21:11

## 2021-03-04 RX ADMIN — CARVEDILOL 12.5 MG: 12.5 TABLET, FILM COATED ORAL at 11:31

## 2021-03-04 RX ADMIN — ENALAPRIL MALEATE 10 MG: 10 TABLET ORAL at 08:40

## 2021-03-04 RX ADMIN — Medication 10 ML: at 08:41

## 2021-03-04 RX ADMIN — SODIUM CHLORIDE: 9 INJECTION, SOLUTION INTRAVENOUS at 08:40

## 2021-03-04 RX ADMIN — ASPIRIN 325 MG: 325 TABLET, COATED ORAL at 08:40

## 2021-03-04 RX ADMIN — SODIUM CHLORIDE: 9 INJECTION, SOLUTION INTRAVENOUS at 10:30

## 2021-03-04 RX ADMIN — FUROSEMIDE 40 MG: 10 INJECTION, SOLUTION INTRAMUSCULAR; INTRAVENOUS at 11:09

## 2021-03-04 RX ADMIN — Medication 0.4 MG: at 10:53

## 2021-03-04 ASSESSMENT — ENCOUNTER SYMPTOMS
ABDOMINAL PAIN: 0
DIARRHEA: 0
SHORTNESS OF BREATH: 1
BLOOD IN STOOL: 0
WHEEZING: 0
CHEST TIGHTNESS: 1
VOMITING: 0
NAUSEA: 0
CONSTIPATION: 0

## 2021-03-04 ASSESSMENT — PAIN SCALES - GENERAL
PAINLEVEL_OUTOF10: 0
PAINLEVEL_OUTOF10: 0

## 2021-03-04 NOTE — PLAN OF CARE
Problem: Infection:  Goal: Will remain free from infection  Description: Will remain free from infection  Outcome: Ongoing     Problem: Safety:  Goal: Free from accidental physical injury  Description: Free from accidental physical injury  Outcome: Ongoing  Note: Patient will remain free from injury. Call light within reach. Bed locked, in lowest position, side rails up x2. Non-skid socks in place. Room free of clutter. Hourly rounding implemented. Will continue to monitor.       Problem: Safety:  Goal: Free from intentional harm  Description: Free from intentional harm  Outcome: Ongoing     Problem: Daily Care:  Goal: Daily care needs are met  Description: Daily care needs are met  Outcome: Ongoing     Problem: Pain:  Goal: Patient's pain/discomfort is manageable  Description: Patient's pain/discomfort is manageable  Outcome: Ongoing     Problem: Pain:  Goal: Pain level will decrease  Description: Pain level will decrease  Outcome: Ongoing     Problem: Pain:  Goal: Control of acute pain  Description: Control of acute pain  Outcome: Ongoing     Problem: Discharge Planning:  Goal: Patients continuum of care needs are met  Description: Patients continuum of care needs are met  Outcome: Ongoing

## 2021-03-04 NOTE — PROGRESS NOTES
Pt arrived to unit from cardiac cath procedure. Pt in stable condition and no distress. Will continue to monitor.

## 2021-03-04 NOTE — POST SEDATION
Sedation Post Procedure Note    Patient Name: Hattie Shah   YOB: 1980  Room/Bed: 1012/1012-02  Medical Record Number: [id number]  Date: 3/4/2021   Time: 10:10 AM         Physicians/Assistants: Bonifacio Medellin MD    Procedure Performed: Right and left heart catheterization, coronary geography, left ventriculography.     Post-Sedation Vital Signs:  Vitals:    03/04/21 0754   BP: (!) 147/110   Pulse: 91   Resp:    Temp: 98.1 °F (36.7 °C)   SpO2: 95%      Vital signs were reviewed and were stable after the procedure (see flow sheet for vitals)            Post-Sedation Exam: Lungs: clear           Complications: none    Electronically signed by Bonifacio Medellin MD on 3/4/2021 at 10:10 AM

## 2021-03-04 NOTE — CONSULTS
Today's Date: 3/3/2021  Patient Name: Harrison Anderson  Date of admission: 3/2/2021 12:41 PM  Patient's age: 45 y.o., 1980  Admission Dx: Elevated troponin [R77.8]    Reason for Consult: management recommendations  Requesting Physician: Eric Conti MD    CHIEF COMPLAINT:  Lung lesions, cardiomyopathy     History Obtained From:  patient    HISTORY OF PRESENT ILLNESS:      The patient is a 45 y.o.   male who is admitted to the hospital for dyspnea, he is found to have systolic heart failure with severe cardiomyopathy  Also CT scan showed multiple lung nodules concerning  Pt is seen and examined  CT abd and pelvis just done. , I reviewed the images, no clear mass or lesion seen, await report. Pt was relatively healthy without known medical issues. He underwent orchiectomy in 2012 due to ?torsion. Pt is seen and examined, he complains of dyspnea. No chest pain       Past Medical History:   has a past medical history of Hypertension and Testicular torsion. Past Surgical History:   has a past surgical history that includes Total shoulder arthroplasty (Left, 1995); Knee arthroscopy (Left, 2009); and Testicle removal (Left, 2012). Medications:    Reviewed in Epic     Allergies:  Patient has no known allergies. Social History:   reports that he has been smoking cigarettes. He has a 9.00 pack-year smoking history. He has never used smokeless tobacco. He reports current alcohol use of about 2.0 standard drinks of alcohol per week. He reports current drug use. Drug: Marijuana. Family History: family history includes Diabetes in his maternal grandmother. REVIEW OF SYSTEMS:    Constitutional: No fever or chills. No night sweats, no weight loss   Eyes: No eye discharge, double vision, or eye pain   HEENT: negative for sore mouth, sore throat, hoarseness and voice change   Respiratory: dyspnea, cough.  No hemoptysis   Cardiovascular: negative for chest pain, dyspnea, palpitations, orthopnea, PND Gastrointestinal: negative for nausea, vomiting, diarrhea, constipation, abdominal pain, Dysphagia, hematemesis and hematochezia   Genitourinary: negative for frequency, dysuria, nocturia, urinary incontinence, and hematuria   Integument: negative for rash, skin lesions, bruises. Hematologic/Lymphatic: negative for easy bruising, bleeding, lymphadenopathy, or petechiae   Endocrine: negative for heat or cold intolerance,weight changes, change in bowel habits and hair loss   Musculoskeletal: negative for myalgias, arthralgias, pain, joint swelling,and bone pain   Neurological: negative for headaches, dizziness, seizures, weakness, numbness    PHYSICAL EXAM:      BP (!) 140/99   Pulse 84   Temp 97.5 °F (36.4 °C) (Oral)   Resp 20   Ht 5' 11\" (1.803 m)   Wt 255 lb 8 oz (115.9 kg)   SpO2 94%   BMI 35.64 kg/m²    Temp (24hrs), Av.6 °F (36.4 °C), Min:97.3 °F (36.3 °C), Max:97.9 °F (36.6 °C)    General appearance - ill appearing. Mental status - alert and cooperative   Eyes - pupils equal and reactive, extraocular eye movements intact   Ears - bilateral TM's and external ear canals normal   Mouth - mucous membranes moist, pharynx normal without lesions   Neck - supple, no significant adenopathy   Lymphatics - no palpable lymphadenopathy, no hepatosplenomegaly   Chest - bilateral crackles.    Heart - normal rate, regular rhythm, normal S1, S2, no murmurs  Abdomen - soft, nontender, nondistended, no masses or organomegaly   Neurological - alert, oriented, normal speech, no focal findings or movement disorder noted   Musculoskeletal - no joint tenderness, deformity or swelling   Extremities - peripheral pulses normal, no pedal edema, no clubbing or cyanosis   Skin - normal coloration and turgor, no rashes, no suspicious skin lesions noted ,    DATA:    Labs:   CBC:   Recent Labs     21  1342 21  0521   WBC 7.5 7.7   HGB 14.7 13.7   HCT 46.2 42.8    229     BMP:   Recent Labs     21  1342 03/03/21  0521    138   K 4.0 3.7   CO2 22 24   BUN 7 11   CREATININE 0.80 1.05   LABGLOM >60 >60   GLUCOSE 112* 127*     PT/INR: No results for input(s): PROTIME, INR in the last 72 hours. IMAGING DATA:      Primary Problem  Elevated troponin    Active Hospital Problems    Diagnosis Date Noted    Class 2 obesity due to excess calories without serious comorbidity with body mass index (BMI) of 35.0 to 35.9 in adult [E66.09, Z68.35] 03/02/2021    Pericardial effusion [I31.3] 03/02/2021    Bilateral pleural effusion [J90] 03/02/2021    Mediastinal adenopathy [R59.0] 03/02/2021    Mild dilation of ascending aorta ([address] [I77.810] 03/02/2021    Multiple pulmonary nodules [R91.8] 03/02/2021    Elevated troponin [R77.8] 03/02/2021    Congestive heart failure ([address] [I50.9] 03/02/2021    Essential hypertension [I10] 05/18/2018    Current every day smoker [F17.200] 05/18/2018    Marijuana smoker [F12.90] 05/18/2018         IMPRESSION:   1. Systolic heart failure, dilated cardiomyopathy, unknown etiology, possibly alcoholic   2. Exclude CAD  3. Alcoholic cirrhosis   4. Lung lesions. Too small for bx  5. Pleural effusion , likely due to CAD     RECOMMENDATIONS:  1. Await cardiac workup  2. Plan PET scan as outpatient. 3. Alcohol cessation       Discussed with patient and Nurse. Thank you for asking us to see this patient.     [facility] MD Jaron  Hematologist/Medical Oncologist  Cell: [phone]

## 2021-03-04 NOTE — BRIEF OP NOTE
Brief Postoperative Note      Patient: Dane Melton  YOB: 1980  MRN: [id number]    Date of Procedure: 3/4/2021    Pre-Op Diagnosis: Cardiomyopathy, new onset congestive heart failure    Post-Op Diagnosis: Severe dilated nonischemic cardiomyopathy  Significant epicardial coronary disease  Dilated sinuses of Valsalva aortic root and ascending aorta    Right heart catheterization shows mild pulmonary hypertension. Anesthesia: 1% lidocaine local infiltration, IV sedation with fentanyl and Versed. Estimated Blood Loss (mL): Minimal    Complications: None        Findings: #1. Severe nonischemic dilated cardiomyopathy with left ventricle ejection fraction of 15 to 20%. 2.  Dilated sinuses of Valsalva aortic root and ascending aorta. 3.  No significant epicardial coronary artery disease. 4.  Mild pulmonary hypertension noted on right heart catheterization.     Electronically signed by Odalis Ayala MD on 3/4/2021 at 10:08 AM

## 2021-03-04 NOTE — PRE SEDATION
Sedation Pre-Procedure Note    Patient Name: Mckenna Sanchez   YOB: 1980  Room/Bed: 1012/1012-02  Medical Record Number: [id number]  Date: 3/4/2021   Time: 9:05 AM       Indication: Dilated cardiomyopathy, acute systolic congestive heart failure. Consent: I have discussed with the patient and/or the patient representative the indication, alternatives, and the possible risks and/or complications of the planned procedure and the anesthesia methods. The patient and/or patient representative appear to understand and agree to proceed. Vital Signs:   Vitals:    03/04/21 0754   BP: (!) 147/110   Pulse: 91   Resp:    Temp: 98.1 °F (36.7 °C)   SpO2: 95%       Past Medical History:   has a past medical history of Hypertension and Testicular torsion. Past Surgical History:   has a past surgical history that includes Total shoulder arthroplasty (Left, 1995); Knee arthroscopy (Left, 2009); and Testicle removal (Left, 2012).     Medications:   Scheduled Meds:    carvedilol  12.5 mg Oral BID WC    furosemide  40 mg Intravenous Daily    sodium chloride flush  10 mL Intravenous 2 times per day    aspirin  325 mg Oral Daily    atorvastatin  40 mg Oral Nightly    enoxaparin  1 mg/kg Subcutaneous BID    enalapril  10 mg Oral Daily    And    hydroCHLOROthiazide  25 mg Oral Daily     Continuous Infusions:    sodium chloride 75 mL/hr at 03/04/21 0840     PRN Meds: sodium chloride flush, sodium chloride flush, promethazine **OR** ondansetron, acetaminophen **OR** acetaminophen, magnesium hydroxide, potassium chloride **OR** potassium alternative oral replacement **OR** potassium chloride, magnesium sulfate, nitroGLYCERIN, perflutren lipid microspheres, albuterol sulfate HFA  Home Meds:   Prior to Admission medications    Not on File     Coumadin Use Last 7 Days:  no  Antiplatelet drug therapy use last 7 days: no  Other anticoagulant use last 7 days: no  Additional Medication Information:  See database. Pre-Sedation Documentation and Exam:   I have personally completed a history, physical exam & review of systems for this patient (see notes).     Mallampati Airway Assessment:  Mallampati Class III - (soft palate & base of uvula are visible)    Prior History of Anesthesia Complications:   none    ASA Classification:  Class 3 - A patient with severe systemic disease that limits activity but is not incapacitating    Sedation/ Anesthesia Plan:   intravenous sedation    Medications Planned:   midazolam (Versed) intravenously and fentanyl intravenously    Patient is an appropriate candidate for plan of sedation: yes    Electronically signed by Maylin Collins MD on 3/4/2021 at 9:05 AM

## 2021-03-04 NOTE — PLAN OF CARE
Problem: Infection:  Goal: Will remain free from infection  Description: Will remain free from infection  3/3/2021 2221 by Redd Knox  Outcome: Ongoing  3/3/2021 1127 by Kiko Garvin RN  Outcome: Ongoing

## 2021-03-04 NOTE — CARE COORDINATION
Discharge Planning    Phone call to Dr Vladimir Cronin's office and staff confirms pt was last seen May 2018 and he will remain active with them as long as he has an appt as at 3 years pt will be dismissed from the practice. Pt getting heart cath today. Will follow up and inform pt.

## 2021-03-04 NOTE — FLOWSHEET NOTE
Pt admitted to room from cath lab. Vitals in progress and IVF infusing. Patient instructed on laying flat restrictions-verbalized understanding, call  Light in reach. Right groin checks in progress & charted.

## 2021-03-04 NOTE — PROGRESS NOTES
marijuana smoking and has been trying to cut back on both. He has attempted to self medicate with drinking herbal tea and had some relief with using a friend's nebulizer treatment. He reports that his girlfriend has noticed that he wakes the middle of the night \"gasping for air\" and will typically progress to a coughing spell. Ultimately, he presented today because last evening he reports that he was awake every 2 hours with shortness of breath and inability to sleep. He endorses intermittent clear/white sputum production and had one episode with pink-tinged sputum after an exceptionally harsh coughing episode the night prior. He denies chest pain, fever, chills, sore throat, nausea/vomiting/change in bowel or bladder habits, no myalgias or lymph node enlargement. He endorses \"soreness\" to his lower ribs and upper abdomen which he attributes to persistent coughing. Additionally he states that over the past 3 months he notices that he has lost muscle mass in his arms and legs while experiencing increasing abdominal distention. He denies any changes in his appetite or early satiety  On presentation, afebrile mildly tachycardic, hypertensive with oxygen saturation 95% and greater on room air. ED course notable for work blood work demonstrating elevated high-sensitivity troponin of 37- 40, elevated proBNP 3,129, and elevated D-dimer 0.85. Twelve-lead EKG demonstrated sinus rhythm at 100 bpm, QTC prolonged at 508 ms, no ST elevation or depression, T wave inversions concerning for lateral ischemia, anterior leads concerning for possible anterior infarct, age undetermined. Portable CXR demonstrated cardiomegaly with mild interstitial pulmonary edema.    CTA of the chest was pursued demonstrating enlarged main pulmonary artery ~ 3.7 cm, numerous randomly distributed small solid noncalcified pulmonary nodules suspicious for pulmonary metastatic disease, pulmonary interstitial thickening suspicious for possible lymphangitic carcinomatosis, bilateral pleural effusions right greater than left, mediastinal and hilar lymphadenopathy suspicious for phillip metastatic disease, dilated ascending aorta ~ 4.5 cm, and cardiomegaly with moderate pericardial effusion. Perinephric stranding at the superior pole of the left kidney suggestive of possible left hydronephrosis.        Review of Systems:     Review of Systems   Constitutional: Positive for activity change and fatigue. Negative for chills, diaphoresis and fever. HENT: Negative for congestion. Eyes: Negative for visual disturbance. Respiratory: Positive for chest tightness and shortness of breath. Negative for wheezing. Cardiovascular: Negative for chest pain, palpitations and leg swelling. Gastrointestinal: Negative for abdominal pain, blood in stool, constipation, diarrhea, nausea and vomiting. Genitourinary: Negative for difficulty urinating. Neurological: Negative for dizziness, weakness, light-headedness, numbness and headaches. All other systems reviewed and are negative. Medications:      Allergies:  No Known Allergies    Current Meds:   Scheduled Meds:    sodium chloride flush  10 mL Intravenous 2 times per day    carvedilol  12.5 mg Oral BID WC    furosemide  40 mg Intravenous Daily    aspirin  325 mg Oral Daily    atorvastatin  40 mg Oral Nightly    enoxaparin  1 mg/kg Subcutaneous BID    enalapril  10 mg Oral Daily    And    hydroCHLOROthiazide  25 mg Oral Daily     Continuous Infusions:    sodium chloride 75 mL/hr at 03/04/21 1030     PRN Meds: sodium chloride flush, acetaminophen, promethazine **OR** ondansetron, [DISCONTINUED] acetaminophen **OR** acetaminophen, magnesium hydroxide, potassium chloride **OR** potassium alternative oral replacement **OR** potassium chloride, magnesium sulfate, nitroGLYCERIN, perflutren lipid microspheres, albuterol sulfate HFA    Data:     Past Medical History:   has a past medical history of Hypertension and Testicular torsion. Social History:   reports that he has been smoking cigarettes. He has a 9.00 pack-year smoking history. He has never used smokeless tobacco. He reports current alcohol use of about 2.0 standard drinks of alcohol per week. He reports current drug use. Drug: Marijuana. Family History:   Family History   Problem Relation Age of Onset    Diabetes Maternal Grandmother     Stroke Neg Hx     Heart Disease Neg Hx     Cancer Neg Hx        Vitals:  BP (!) 138/100   Pulse 82   Temp 98 °F (36.7 °C) (Temporal)   Resp 16   Ht 5' 11\" (1.803 m)   Wt 249 lb 4 oz (113.1 kg)   SpO2 99%   BMI 34.76 kg/m²   Temp (24hrs), Av.8 °F (36.6 °C), Min:97.3 °F (36.3 °C), Max:98.1 °F (36.7 °C)    No results for input(s): POCGLU in the last 72 hours. I/O (24Hr):     Intake/Output Summary (Last 24 hours) at 3/4/2021 1224  Last data filed at 3/4/2021 1130  Gross per 24 hour   Intake 485 ml   Output 450 ml   Net 35 ml       Labs:  Hematology:  Recent Labs     21  1342 21  0521   WBC 7.5 7.7   RBC 5.31 5.00   HGB 14.7 13.7   HCT 46.2 42.8   MCV 87.0 85.6   MCH 27.7 27.4   MCHC 31.8 32.0   RDW 15.0* 15.0*    229   MPV 10.4 10.8   SEDRATE  --  1   CRP  --  <3.0   DDIMER 0.85*  --      Chemistry:  Recent Labs     21  1342 21  1611 21  1939 21  2203 21  0521 21  0802     --   --   --  138 141   K 4.0  --   --   --  3.7 4.2     --   --   --  104 105   CO2 22  --   --   --  24 24   GLUCOSE 112*  --   --   --  127* 115*   BUN 7  --   --   --  11 12   CREATININE 0.80  --   --   --  1.05 0.90   MG  --   --   --   --  1.8  --    ANIONGAP 12  --   --   --  10 12   LABGLOM >60  --   --   --  >60 >60   GFRAA >60  --   --   --  >60 >60   CALCIUM 9.0  --   --   --  8.7 9.0   PROBNP 3,129*  --   --   --   --   --    TROPHS 37* 40* 40* 47*  --   --    MYOGLOBIN 31  --   --   --   --   --      Recent Labs     21  1342 21  0521 PROT 6.6 6.3*   LABALBU 4.2 3.6   AST 21 17   ALT 26 20   ALKPHOS 93 84   BILITOT 0.81 0.56   BILIDIR 0.21  --    CHOL  --  179   HDL  --  44   LDLCHOLESTEROL  --  102   CHOLHDLRATIO  --  4.1   TRIG  --  166*   VLDL  --  NOT REPORTED*     ABG:No results found for: POCPH, PHART, PH, POCPCO2, RDX5IPI, PCO2, POCPO2, PO2ART, PO2, POCHCO3, DKS5WUF, HCO3, NBEA, PBEA, BEART, BE, THGBART, THB, UUB9QZU, LRHP6CIP, B7GJBQMI, O2SAT, FIO2  No results found for: SPECIAL  No results found for: CULTURE    Radiology:  Xr Chest (2 Vw)    Result Date: 3/2/2021  Cardiomegaly with mild interstitial pulmonary edema. Pericardial effusion should also be considered. RECOMMENDATION: Cardiology consultation and correlation with echocardiography. Probable tiny right pleural effusion. No large effusion. Ct Chest Pulmonary Embolism W Contrast    Result Date: 3/2/2021  No evidence of pulmonary embolism. Enlarged main pulmonary artery measuring 3.7 cm. Query underlying pulmonary hypertension. Numerous randomly distributed small solid noncalcified pulmonary nodules, the largest measuring up to 7 mm, highly suspicious for pulmonary metastatic disease. Although the primary is unknown, correlate for prior history of testicular cancer in the setting of previous left orchiectomy. Pulmonary interstitial thickening which is somewhat irregular in appearance, and as such suspicious for possible lymphangitic carcinomatosis, though interstitial edema is a differential consideration. Small right and trace left pleural effusions. Abnormal mediastinal and hilar lymphadenopathy suspicious for phillip metastatic disease. Cardiomegaly with a moderate pericardial effusion. Ascending aorta prominent at 4.5 cm. Descending thoracic aortic atherosclerotic disease, advanced for age. Suggestion of possible left hydronephrosis as there is moderate left perinephric stranding at the superior pole left kidney.   Correlate for any abdominal pain and consider dedicated abdominal imaging to evaluate for any retroperitoneal adenopathy or mass. Physical Examination:        Physical Exam  Vitals signs and nursing note reviewed. Constitutional:       General: He is not in acute distress. HENT:      Head: Normocephalic and atraumatic. Eyes:      Conjunctiva/sclera: Conjunctivae normal.      Pupils: Pupils are equal, round, and reactive to light. Cardiovascular:      Rate and Rhythm: Normal rate and regular rhythm. Heart sounds: No murmur. Pulmonary:      Effort: Pulmonary effort is normal. No accessory muscle usage or respiratory distress. Breath sounds: No stridor. No decreased breath sounds, wheezing, rhonchi or rales. Abdominal:      General: Bowel sounds are normal. There is no distension. Palpations: Abdomen is soft. Abdomen is not rigid. Tenderness: There is no abdominal tenderness. There is no guarding. Musculoskeletal:         General: No tenderness. Skin:     General: Skin is warm and dry. Findings: No erythema, lesion or rash. Neurological:      Mental Status: He is alert and oriented to person, place, and time. Cranial Nerves: No cranial nerve deficit. Motor: No seizure activity. Psychiatric:         Speech: Speech normal.         Behavior: Behavior normal. Behavior is cooperative.          Assessment:        Hospital Problems           Last Modified POA    * (Principal) Elevated troponin 3/3/2021 Yes    Essential hypertension (Chronic) 3/2/2021 Yes    Current every day smoker 3/2/2021 Yes    Marijuana smoker (Chronic) 3/2/2021 Yes    Class 2 obesity due to excess calories without serious comorbidity with body mass index (BMI) of 35.0 to 35.9 in adult 3/2/2021 Yes    Pericardial effusion 3/2/2021 Yes    Bilateral pleural effusion 3/2/2021 Yes    Mediastinal adenopathy 3/2/2021 Yes    Mild dilation of ascending aorta ([address] 3/2/2021 Yes    Multiple pulmonary nodules 3/2/2021 Yes    Congestive heart failure

## 2021-03-05 ENCOUNTER — APPOINTMENT (OUTPATIENT)
Dept: ULTRASOUND IMAGING | Age: 41
DRG: 281 | End: 2021-03-05

## 2021-03-05 VITALS
TEMPERATURE: 97.5 F | RESPIRATION RATE: 16 BRPM | OXYGEN SATURATION: 95 % | BODY MASS INDEX: 34.27 KG/M2 | HEART RATE: 83 BPM | WEIGHT: 244.8 LBS | HEIGHT: 71 IN | SYSTOLIC BLOOD PRESSURE: 130 MMHG | DIASTOLIC BLOOD PRESSURE: 88 MMHG

## 2021-03-05 PROBLEM — I42.8 NON-ISCHEMIC CARDIOMYOPATHY (HCC): Status: ACTIVE | Noted: 2021-03-05

## 2021-03-05 PROCEDURE — 99232 SBSQ HOSP IP/OBS MODERATE 35: CPT | Performed by: INTERNAL MEDICINE

## 2021-03-05 PROCEDURE — 99232 SBSQ HOSP IP/OBS MODERATE 35: CPT | Performed by: FAMILY MEDICINE

## 2021-03-05 PROCEDURE — 2580000003 HC RX 258: Performed by: INTERNAL MEDICINE

## 2021-03-05 PROCEDURE — 6370000000 HC RX 637 (ALT 250 FOR IP): Performed by: FAMILY MEDICINE

## 2021-03-05 PROCEDURE — 6370000000 HC RX 637 (ALT 250 FOR IP): Performed by: NURSE PRACTITIONER

## 2021-03-05 PROCEDURE — 76705 ECHO EXAM OF ABDOMEN: CPT

## 2021-03-05 PROCEDURE — 6360000002 HC RX W HCPCS: Performed by: FAMILY MEDICINE

## 2021-03-05 RX ORDER — POTASSIUM CHLORIDE 750 MG/1
10 TABLET, EXTENDED RELEASE ORAL DAILY
Qty: 30 TABLET | Refills: 1 | Status: ON HOLD | OUTPATIENT
Start: 2021-03-05 | End: 2022-02-02 | Stop reason: HOSPADM

## 2021-03-05 RX ORDER — FUROSEMIDE 80 MG
80 TABLET ORAL DAILY
Qty: 60 TABLET | Refills: 3 | Status: SHIPPED | OUTPATIENT
Start: 2021-03-05

## 2021-03-05 RX ORDER — SPIRONOLACTONE 25 MG/1
25 TABLET ORAL DAILY
Status: DISCONTINUED | OUTPATIENT
Start: 2021-03-05 | End: 2021-03-05 | Stop reason: HOSPADM

## 2021-03-05 RX ORDER — ENALAPRIL MALEATE 10 MG/1
10 TABLET ORAL DAILY
Qty: 30 TABLET | Refills: 3 | Status: SHIPPED | OUTPATIENT
Start: 2021-03-06 | End: 2021-09-15 | Stop reason: SDUPTHER

## 2021-03-05 RX ORDER — FUROSEMIDE 80 MG
80 TABLET ORAL DAILY
Status: DISCONTINUED | OUTPATIENT
Start: 2021-03-05 | End: 2021-03-05 | Stop reason: HOSPADM

## 2021-03-05 RX ORDER — ATORVASTATIN CALCIUM 40 MG/1
40 TABLET, FILM COATED ORAL NIGHTLY
Qty: 30 TABLET | Refills: 3 | Status: SHIPPED | OUTPATIENT
Start: 2021-03-05

## 2021-03-05 RX ORDER — HYDROCHLOROTHIAZIDE 25 MG/1
25 TABLET ORAL DAILY
Qty: 30 TABLET | Refills: 3 | Status: SHIPPED | OUTPATIENT
Start: 2021-03-06 | End: 2021-09-15 | Stop reason: SDUPTHER

## 2021-03-05 RX ORDER — CARVEDILOL 12.5 MG/1
12.5 TABLET ORAL 2 TIMES DAILY WITH MEALS
Qty: 60 TABLET | Refills: 3 | Status: SHIPPED | OUTPATIENT
Start: 2021-03-05 | End: 2021-09-15 | Stop reason: SDUPTHER

## 2021-03-05 RX ADMIN — THIAMINE HCL TAB 100 MG 100 MG: 100 TAB at 07:57

## 2021-03-05 RX ADMIN — FOLIC ACID 1 MG: 1 TABLET ORAL at 07:57

## 2021-03-05 RX ADMIN — CARVEDILOL 12.5 MG: 12.5 TABLET, FILM COATED ORAL at 17:23

## 2021-03-05 RX ADMIN — CARVEDILOL 12.5 MG: 12.5 TABLET, FILM COATED ORAL at 07:57

## 2021-03-05 RX ADMIN — FUROSEMIDE 40 MG: 10 INJECTION, SOLUTION INTRAVENOUS at 07:56

## 2021-03-05 RX ADMIN — ASPIRIN 325 MG: 325 TABLET, COATED ORAL at 07:57

## 2021-03-05 RX ADMIN — Medication 10 ML: at 07:59

## 2021-03-05 ASSESSMENT — ENCOUNTER SYMPTOMS
CONSTIPATION: 0
WHEEZING: 0
NAUSEA: 0
ABDOMINAL PAIN: 0
DIARRHEA: 0
VOMITING: 0
BLOOD IN STOOL: 0

## 2021-03-05 ASSESSMENT — PAIN SCALES - GENERAL
PAINLEVEL_OUTOF10: 1
PAINLEVEL_OUTOF10: 0

## 2021-03-05 NOTE — PLAN OF CARE
Problem: Infection:  Goal: Will remain free from infection  Description: Will remain free from infection  3/5/2021 1225 by David Samson RN  Outcome: Ongoing  Note: Pt shows no signs of infection, will continue to monitor     Problem: Safety:  Goal: Free from accidental physical injury  Description: Free from accidental physical injury  3/5/2021 1224 by David Samson RN  Outcome: Ongoing  3/5/2021 0538 by Moo Garcia RN  Outcome: Ongoing     Problem: Daily Care:  Goal: Daily care needs are met  Description: Daily care needs are met  3/5/2021 1224 by David Samson RN  Outcome: Ongoing  3/5/2021 0538 by Moo Garcia RN  Outcome: Ongoing     Problem: Pain:  Goal: Patient's pain/discomfort is manageable  Description: Patient's pain/discomfort is manageable  3/5/2021 1225 by David Samson RN  Note: Pt shows no sign of pain, will continue to monitor  3/5/2021 1224 by David Samson RN  Outcome: Ongoing     Problem: Skin Integrity:  Goal: Skin integrity will stabilize  Description: Skin integrity will stabilize  Outcome: Ongoing     Problem: Discharge Planning:  Goal: Patients continuum of care needs are met  Description: Patients continuum of care needs are met  3/5/2021 0538 by Moo Garcia RN  Outcome: Ongoing     Problem: OXYGENATION/RESPIRATORY FUNCTION  Goal: Patient will maintain patent airway  Outcome: Ongoing     Problem: OXYGENATION/RESPIRATORY FUNCTION  Goal: Patient will achieve/maintain normal respiratory rate/effort  Description: Respiratory rate and effort will be within normal limits for the patient  Outcome: Ongoing     Problem: FLUID AND ELECTROLYTE IMBALANCE  Goal: Fluid and electrolyte balance are achieved/maintained  3/5/2021 1225 by David Samson RN  Outcome: Ongoing  Note: Pt instructed on fluid restriction.  Will monitor adherence  3/5/2021 1224 by David Samson RN  Outcome: Ongoing

## 2021-03-05 NOTE — CARE COORDINATION
Discharge Planning:    During morning rounds it was brought up that this patient may need a life vest at d/c. The patient does not have health insurance. Writer called Doc Bloch with [id number] [facility] at [phone] and inquired about what can be done for a patient that does not have health insurance. There are 3 options:    1) [facility] would provide life vest for no charge, but the hospital would have to agree not to bill the patient for care received during patient's stay. 2) Cost Share-Hospital would pay 1st month of [address] would cover the remainder month(s). 3) Patient Pay- Requires patient to fill out paperwork and submit to [employer], this takes approx 2-3 business days. Patient may not have any OOP expense, but may have to pay a percentage based on Medicaid guidelines. Options 1 & 2 are available over the weekend, but option 3 cannot be done over the weekend. Call Doc Bloch with [employer] at [phone] with any questions. 4:30pm  Spoke with CM Supervisor, Belen Dove, and depending on what cardiology orders, and if it is in fact the life vest the patient will need to fill out the application to determine the patient's OOP responsibility. This process cannot be done over the weekend and writer attempted to start process today, but writer must have an order to start this process. Once an order is received for the life vest, the order must be faxed to [facility] at [phone] and notify Carolina Rutledge with [employer] [facility] that the order has been faxed. Doc Bloch will then get it to the right department at [facility], the application will be created and sent to the CM/or hand delivered by Doc Bloch to patient.

## 2021-03-05 NOTE — PROGRESS NOTES
Pt given discharge instructions and all questions answered. Pt has all belongings and in no distress.

## 2021-03-05 NOTE — DISCHARGE SUMMARY
[facility]  Office: [address], DO, Kristy Cool, DO, Celestine Duane, DO, Joe Pae Blood, DO, Jhony Ward MD, Ny Don MD, Silverio Monae MD, Robert Hassan MD, Mike Bajwa MD, Rajeev Crooks MD, Jose Murray MD, Augustina Lesch, MD, French Mar MD, [facility], DO, Cameron Phipps MD, Adriana Moss, DO, Daisha Alvarez MD,  Saurabh Marie, , Christina Hand MD, Sariah Douglas MD, Lynn Martins, [facility], [facility], CNP, Sammie Ramirez, CNP, Jojo Sung, CNS, Julia Porter, CNP, Jam Roman, CNP, Vincent Cade, CNP, Beti Vazquez, CNP, José Whitney, CNP, Cruz Garcia PA-C, Vern Navarro, [facility], Mikayla Felix, CNP, Do Hammond, CNP, Dominick Crane, CNP, Ashley Zhao, CNP, Marilee Haque, CNP, Cecilio Zuluaga, [facility]    Discharge Summary     Patient ID: Rufus Baldwin  :  1980   MRN: [id number]     ACCOUNT:  [de-identified]   Patient's PCP: Huong Neville MD  Admit Date: 3/2/2021   Discharge Date: 3/5/2021    Length of Stay: 3  Code Status:  Prior  Admitting Physician: Robert Hassan MD  Discharge Physician: Robert Hassan MD     Active Discharge Diagnoses:     Hospital Problem Lists:  Principal Problem:    Elevated troponin  Active Problems:    Essential hypertension    Current every day smoker    Marijuana smoker    Class 2 obesity due to excess calories without serious comorbidity with body mass index (BMI) of 35.0 to 35.9 in adult    Pericardial effusion    Bilateral pleural effusion    Mediastinal adenopathy    Mild dilation of ascending aorta (HCC)    Multiple pulmonary nodules    Congestive heart failure (HCC)    Non-ischemic cardiomyopathy ([facility] [address]  Resolved Problems:    * No resolved hospital problems.  *      Admission Condition:  poor     Discharged Condition: fair    Hospital Stay:     HPI:    Mr. Collin Corey reports that for several weeks he has experienced episodes of significant shortness of breath with \"coughing fits\" that typically worsen at night. Mary Capellan has history of cigarette smoking and occasional marijuana smoking and has been trying to cut back on both. Mary Capellan has attempted to self medicate with drinking herbal tea and had some relief with using a friend's nebulizer treatment. Mary Capellan reports that his girlfriend has noticed that he wakes the middle of the night \"gasping for air\" and will typically progress to a coughing spell.  Ultimately, he presented today because last evening he reports that he was awake every 2 hours with shortness of breath and inability to sleep.  He endorses intermittent clear/white sputum production and had one episode with pink-tinged sputum after an exceptionally harsh coughing episode the night prior. Mary Capellan denies chest pain, fever, chills, sore throat, nausea/vomiting/change in bowel or bladder habits, no myalgias or lymph node enlargement.  He endorses \"soreness\" to his lower ribs and upper abdomen which he attributes to persistent coughing.  Additionally he states that over the past 3 months he notices that he has lost muscle mass in his arms and legs while experiencing increasing abdominal distention.  He denies any changes in his appetite or early satiety  On presentation, afebrile mildly tachycardic, hypertensive with oxygen saturation 95% and greater on room air.   ED course notable for work blood work demonstrating elevated high-sensitivity troponin of 37- 40, elevated proBNP 3,129, and elevated D-dimer 0.85.   Twelve-lead EKG demonstrated sinus rhythm at 100 bpm, QTC prolonged at 508 ms, no ST elevation or depression, T wave inversions concerning for lateral ischemia, anterior leads concerning for possible anterior infarct, age undetermined. Portable CXR demonstrated cardiomegaly with mild interstitial pulmonary edema.   CTA of the chest was pursued demonstrating enlarged main pulmonary artery ~ 3.7 cm, numerous randomly distributed small solid noncalcified interventions:     Significant Diagnostic Studies:     Radiology:  Xr Chest (2 Vw)    Result Date: 3/2/2021  Cardiomegaly with mild interstitial pulmonary edema. Pericardial effusion should also be considered. RECOMMENDATION: Cardiology consultation and correlation with echocardiography. Probable tiny right pleural effusion. No large effusion. Ct Abdomen Pelvis W Iv Contrast Additional Contrast? Radiologist Recommendation    Result Date: 3/3/2021  1. No acute process, definite metastatic disease, or signs of primary malignancy in the abdomen or pelvis. 2.  Hepatomegaly with coarsened attenuation of the liver, possibly related to hepatocellular disease, congestion, or hepatic steatosis. 3.  Mild gallbladder wall thickening without cholelithiasis, which could also be related to hepatic or cardiac disease. If there is concern for acute cholecystitis, consider ultrasound. 4.  Nondistended urinary bladder with mild wall thickening, nonspecific. 5.  Mildly prominent right external iliac chain lymph node, which is nonspecific and could be reactive. Otherwise no significant lymphadenopathy. 6.  Cardiomegaly, small pericardial effusion, small right pleural effusion, and trace left pleural fluid are similar to the previous CT chest.  Mild interlobular septal thickening and hazy ground-glass attenuation in the visualized lung bases are also similar and suggestive of pulmonary edema. Mild atherosclerosis noted. Us Liver    Result Date: 3/5/2021  Multiple cholelithiasis without ultrasound evidence cholecystitis or biliary tree obstruction. Ct Chest Pulmonary Embolism W Contrast    Result Date: 3/2/2021  No evidence of pulmonary embolism. Enlarged main pulmonary artery measuring 3.7 cm. Query underlying pulmonary hypertension. Numerous randomly distributed small solid noncalcified pulmonary nodules, the largest measuring up to 7 mm, highly suspicious for pulmonary metastatic disease.   Although the primary is Further Evaluation/Follow Up POST HOSPITALIZATION/Incidental Findings:     Diet: cardiac diet    Activity: As tolerated    Instructions to Patient:     Discharge Medications:      Medication List      START taking these medications    atorvastatin 40 MG tablet  Commonly known as: LIPITOR  Take 1 tablet by mouth nightly     carvedilol 12.5 MG tablet  Commonly known as: COREG  Take 1 tablet by mouth 2 times daily (with meals)     enalapril 10 MG tablet  Commonly known as: VASOTEC  Take 1 tablet by mouth daily     furosemide 80 MG tablet  Commonly known as: Lasix  Take 1 tablet by mouth daily     hydroCHLOROthiazide 25 MG tablet  Commonly known as: HYDRODIURIL  Take 1 tablet by mouth daily     potassium chloride 10 MEQ extended release tablet  Commonly known as: KLOR-CON M  Take 1 tablet by mouth daily        STOP taking these medications    Cipro 500 MG tablet  Generic drug: ciprofloxacin     enalapril-hydroCHLOROthiazide 10-25 MG per tablet  Commonly known as: VASERETIC           Where to Get Your Medications      These medications were sent to [facility] [address]    Phone: [phone]   atorvastatin 40 MG tablet  carvedilol 12.5 MG tablet  enalapril 10 MG tablet  furosemide 80 MG tablet  hydroCHLOROthiazide 25 MG tablet  potassium chloride 10 MEQ extended release tablet         Discharge Procedure Orders   Basic Metabolic Panel   Standing Status: Future Standing Exp. Date: 03/05/22     Initiate PAT Protocol   Standing Status: Future Standing Exp. Date: 05/02/21       Time Spent on discharge is  34 mins in patient examination, evaluation, counseling as well as medication reconciliation, prescriptions for required medications, discharge plan and follow up.     Electronically signed by   Chandler Gaxiola MD  3/6/2021  7:55 AM      Thank you Dr. Ashlee Mary MD for the opportunity to be involved in this patient's care.

## 2021-03-05 NOTE — PROGRESS NOTES
marijuana smoking and has been trying to cut back on both. He has attempted to self medicate with drinking herbal tea and had some relief with using a friend's nebulizer treatment. He reports that his girlfriend has noticed that he wakes the middle of the night \"gasping for air\" and will typically progress to a coughing spell. Ultimately, he presented today because last evening he reports that he was awake every 2 hours with shortness of breath and inability to sleep. He endorses intermittent clear/white sputum production and had one episode with pink-tinged sputum after an exceptionally harsh coughing episode the night prior. He denies chest pain, fever, chills, sore throat, nausea/vomiting/change in bowel or bladder habits, no myalgias or lymph node enlargement. He endorses \"soreness\" to his lower ribs and upper abdomen which he attributes to persistent coughing. Additionally he states that over the past 3 months he notices that he has lost muscle mass in his arms and legs while experiencing increasing abdominal distention. He denies any changes in his appetite or early satiety  On presentation, afebrile mildly tachycardic, hypertensive with oxygen saturation 95% and greater on room air. ED course notable for work blood work demonstrating elevated high-sensitivity troponin of 37- 40, elevated proBNP 3,129, and elevated D-dimer 0.85. Twelve-lead EKG demonstrated sinus rhythm at 100 bpm, QTC prolonged at 508 ms, no ST elevation or depression, T wave inversions concerning for lateral ischemia, anterior leads concerning for possible anterior infarct, age undetermined. Portable CXR demonstrated cardiomegaly with mild interstitial pulmonary edema.    CTA of the chest was pursued demonstrating enlarged main pulmonary artery ~ 3.7 cm, numerous randomly distributed small solid noncalcified pulmonary nodules suspicious for pulmonary metastatic disease, pulmonary interstitial thickening suspicious for possible lymphangitic carcinomatosis, bilateral pleural effusions right greater than left, mediastinal and hilar lymphadenopathy suspicious for phillip metastatic disease, dilated ascending aorta ~ 4.5 cm, and cardiomegaly with moderate pericardial effusion. Perinephric stranding at the superior pole of the left kidney suggestive of possible left hydronephrosis.        Review of Systems:     Review of Systems   Constitutional: Positive for activity change. Negative for chills, diaphoresis and fever. HENT: Negative for congestion. Eyes: Negative for visual disturbance. Respiratory: Negative for wheezing. Cardiovascular: Negative for chest pain, palpitations and leg swelling. Gastrointestinal: Negative for abdominal pain, blood in stool, constipation, diarrhea, nausea and vomiting. Genitourinary: Negative for difficulty urinating. Neurological: Negative for dizziness, weakness, light-headedness, numbness and headaches. All other systems reviewed and are negative. Medications:      Allergies:  No Known Allergies    Current Meds:   Scheduled Meds:    enoxaparin  40 mg Subcutaneous Daily    sodium chloride flush  10 mL Intravenous 2 times per day    folic acid  1 mg Oral Daily    thiamine mononitrate  100 mg Oral Daily    carvedilol  12.5 mg Oral BID WC    furosemide  40 mg Intravenous Daily    aspirin  325 mg Oral Daily    atorvastatin  40 mg Oral Nightly    enalapril  10 mg Oral Daily    And    hydroCHLOROthiazide  25 mg Oral Daily     Continuous Infusions:     PRN Meds: sodium chloride flush, acetaminophen, promethazine **OR** ondansetron, [DISCONTINUED] acetaminophen **OR** acetaminophen, magnesium hydroxide, potassium chloride **OR** potassium alternative oral replacement **OR** potassium chloride, magnesium sulfate, nitroGLYCERIN, perflutren lipid microspheres, albuterol sulfate HFA    Data:     Past Medical History:   has a past medical history of Hypertension and Testicular AST 21 17   ALT 26 20   ALKPHOS 93 84   BILITOT 0.81 0.56   BILIDIR 0.21  --    CHOL  --  179   HDL  --  44   LDLCHOLESTEROL  --  102   CHOLHDLRATIO  --  4.1   TRIG  --  166*   VLDL  --  NOT REPORTED*     ABG:No results found for: POCPH, PHART, PH, POCPCO2, TZV1AHJ, PCO2, POCPO2, PO2ART, PO2, POCHCO3, MTR8BXO, HCO3, NBEA, PBEA, BEART, BE, THGBART, THB, NQH9JFS, QYJG2PDJ, V5GCUTJT, O2SAT, FIO2  No results found for: SPECIAL  No results found for: CULTURE    Radiology:  Xr Chest (2 Vw)    Result Date: 3/2/2021  Cardiomegaly with mild interstitial pulmonary edema. Pericardial effusion should also be considered. RECOMMENDATION: Cardiology consultation and correlation with echocardiography. Probable tiny right pleural effusion. No large effusion. Ct Chest Pulmonary Embolism W Contrast    Result Date: 3/2/2021  No evidence of pulmonary embolism. Enlarged main pulmonary artery measuring 3.7 cm. Query underlying pulmonary hypertension. Numerous randomly distributed small solid noncalcified pulmonary nodules, the largest measuring up to 7 mm, highly suspicious for pulmonary metastatic disease. Although the primary is unknown, correlate for prior history of testicular cancer in the setting of previous left orchiectomy. Pulmonary interstitial thickening which is somewhat irregular in appearance, and as such suspicious for possible lymphangitic carcinomatosis, though interstitial edema is a differential consideration. Small right and trace left pleural effusions. Abnormal mediastinal and hilar lymphadenopathy suspicious for phillip metastatic disease. Cardiomegaly with a moderate pericardial effusion. Ascending aorta prominent at 4.5 cm. Descending thoracic aortic atherosclerotic disease, advanced for age. Suggestion of possible left hydronephrosis as there is moderate left perinephric stranding at the superior pole left kidney.   Correlate for any abdominal pain and consider dedicated abdominal imaging to evaluate for any retroperitoneal adenopathy or mass. Physical Examination:        Physical Exam  Vitals signs and nursing note reviewed. Constitutional:       General: He is not in acute distress. HENT:      Head: Normocephalic and atraumatic. Eyes:      Conjunctiva/sclera: Conjunctivae normal.      Pupils: Pupils are equal, round, and reactive to light. Cardiovascular:      Rate and Rhythm: Normal rate and regular rhythm. Heart sounds: No murmur. Pulmonary:      Effort: Pulmonary effort is normal. No accessory muscle usage or respiratory distress. Breath sounds: No stridor. No decreased breath sounds, wheezing, rhonchi or rales. Abdominal:      General: Bowel sounds are normal. There is no distension. Palpations: Abdomen is soft. Abdomen is not rigid. Tenderness: There is no abdominal tenderness. There is no guarding. Musculoskeletal:         General: No tenderness. Skin:     General: Skin is warm and dry. Findings: No erythema, lesion or rash. Neurological:      Mental Status: He is alert and oriented to person, place, and time. Cranial Nerves: No cranial nerve deficit. Motor: No seizure activity. Psychiatric:         Speech: Speech normal.         Behavior: Behavior normal. Behavior is cooperative.          Assessment:        Hospital Problems           Last Modified POA    * (Principal) Elevated troponin 3/3/2021 Yes    Essential hypertension (Chronic) 3/2/2021 Yes    Current every day smoker 3/2/2021 Yes    Marijuana smoker (Chronic) 3/2/2021 Yes    Class 2 obesity due to excess calories without serious comorbidity with body mass index (BMI) of 35.0 to 35.9 in adult 3/2/2021 Yes    Pericardial effusion 3/2/2021 Yes    Bilateral pleural effusion 3/2/2021 Yes    Mediastinal adenopathy 3/2/2021 Yes    Mild dilation of ascending aorta ([address] 3/2/2021 Yes    Multiple pulmonary nodules 3/2/2021 Yes    Congestive heart failure ([address] 3/2/2021 Yes          Plan: Acute decompensated systolic  heart failure :  New diagnosis  Echo with global hypokinesia and EF of 20%  S/P cardiac cath 3/4 , minimal CAD   Likely to need life vest on discharge   Lasix 40 mg IV , strict I's & O's, daily weight, cardiac diet and fluid restriction  Appreciate Cardiology input     NSTEMI :   ACS ruled out   Likely  type 2 given above and uncontrolled BP   Switch Lovenox to prophylactic       Pericardial effusion on CT chest  Small on echo  His CRP and  ESR are negative, his  PSAHA is pending    Dilated aortic root and ascending aorta : Will need to F/U with CTS     Multiple pulmonary nodules   Concerns for mets ? No lesion/ mass on  CT A/P   Oncology evaluated patient, plan for PET scan as OP     Suspected L hydronephrosis :   Ruled out on  CT A/P     Essential HTN:   -Uncontrolled but did not get meds in am due to cath   -Resume home meds  -Added Coreg  - Reasses in am     Pleural effusion :   Diuresis     Obesity is a complicating factor    Alcohol abuse : encouraged abstinence, monitor S/S of withdrawal , MVI  No evidence of liver cirrhosis on liver US     Tobacco abuse: Tobacco cessation education /nicotine patch    DVT prophylaxis : On therapeutic Lovenox     Discussed with the patient and the nurse      Will discharge when arrangements complete and ok with other services.   Follow-up with PCP in one week, Henry Castillo MD  Notify PCP of discharge      Gloria Berry MD  3/5/2021  7:44 AM

## 2021-03-05 NOTE — PROGRESS NOTES
Patient weight is between 101-149kg. For prophylaxis with Enoxaparin, Pharmacy adjusted the dose to account for the patient's increased body weight in accordance with hospital approved protocol. The dose has been changed to 30mg BID. Please contact pharmacy with any concerns @ [phone]. Thank you. Ernst Campbell  3/5/2021 7:46 AM  Weight = 111 kg.

## 2021-03-05 NOTE — PROGRESS NOTES
Nutrition Education    · Verbally reviewed information with Patient and significant other  · Educated on Low Sodium diet  · Written educational materials provided. · Contact name and number provided. · Refer to Patient Education activity for more details.         Perrin Buerger MFN, RDN, LDN  Lead Clinical Dietitian  RD Office Phone [phone]

## 2021-03-05 NOTE — PROGRESS NOTES
Section of Cardiology  Progress Note      Date:  3/5/2021  Patient: Serina Cogan Price  Admission:  3/2/2021 12:41 PM  Admit DX: Elevated troponin [R77.8]  Age:  45 y.o., 1980     LOS: 3 days     Reason for evaluation:   Nonischemic dilated cardiomyopathy  History of alcohol abuse    SUBJECTIVE:     The patient was seen and examined. Notes and labs reviewed. There were not complications over night. Patient's cardiac review of systems: negative. The patient is generally feeling unchanged. OBJECTIVE:      EXAM:   Vitals:    VITALS:  /88   Pulse 83   Temp 97.5 °F (36.4 °C) (Oral)   Resp 16   Ht 5' 11\" (1.803 m)   Wt 244 lb 12.8 oz (111 kg)   SpO2 95%   BMI 34.14 kg/m²   24HR INTAKE/OUTPUT:      Intake/Output Summary (Last 24 hours) at 3/5/2021 1800  Last data filed at 3/5/2021 1301  Gross per 24 hour   Intake 480 ml   Output --   Net 480 ml       CONSTITUTIONAL:  awake, alert, cooperative, no apparent distress, and appears stated age. HEENT: Normal jugular venous pulsations, no carotid bruits. Head is atraumatic, normocephalic. Eyes and oral mucosa are normal.  LUNGS: Good respiratory effort On auscultation: clear to auscultation bilaterally  CARDIOVASCULAR:  Normal apical impulse, regular rate and rhythm, normal S1 and S2, no S3 or S4, and no murmur or rub noted. dorsalis pedis and bilateralpresent 2+  ABDOMEN: Soft, nontender, nondistended. Bowel sounds present. No masses or tenderness. No bruit. SKIN: Warm and dry. EXTREMITIES:No lower extremity edema. Motor movement grossly intact. No cyanosis or clubbing.     Current Inpatient Medications:   enoxaparin  30 mg Subcutaneous BID    sodium chloride flush  10 mL Intravenous 2 times per day    folic acid  1 mg Oral Daily    thiamine mononitrate  100 mg Oral Daily    carvedilol  12.5 mg Oral BID WC    furosemide  40 mg Intravenous Daily    aspirin  325 mg Oral Daily    atorvastatin  40 mg Oral Nightly    enalapril  10 mg Oral

## 2021-03-05 NOTE — FLOWSHEET NOTE
Patient was sitting up in bed resting (just hung up from phone call, spouse enters shortly there-after). Patient engaged in conversation and states he \"feels great. \" The patient shares that his shortness of breath is heart related. Writer provides listening support and spiritual presence. Patient has a positive attitude and spirit and speaks from the heart. Patient opens up about his need to make sure others that are around him are taken care of before he focuses on himself. Writer was transparent and offered suggestions based on his own personal experiences. Patient was very thankful for the conversation and sharing moments. Writer stated he will prayer for continued healing and comfort during his stay. Spiritual care will follow up as needed or requested. 03/05/21 1008   Encounter Summary   Services provided to: Patient   Referral/Consult From: [facility]   Support System Spouse; Children;Family members   Continue Visiting   (3/5/2021)   Complexity of Encounter Low   Length of Encounter 30 minutes   Spiritual Assessment Completed Yes   Routine   Type Initial   Assessment Calm; Approachable;Peaceful   Intervention Active listening;Sustaining presence/ Ministry of presence; Discussed meaning/purpose;Discussed illness/injury and it's impact   Outcome Expressed gratitude;Engaged in conversation

## 2021-03-05 NOTE — PROGRESS NOTES
Today's Date: 3/4/2021  Patient Name: Deyanira Felix  Date of admission: 3/2/2021 12:41 PM  Patient's age: 45 y.o., 1980  Admission Dx: Elevated troponin [R77.8]    Reason for Consult: management recommendations  Requesting Physician: Kaylah Eddy MD    CHIEF COMPLAINT:  Lung lesions, cardiomyopathy   Interval history  The patient is seen and evaluated. Cardiac catheterization was done today and showed no coronary artery disease. Dilated cardiomyopathy is nonischemic. The patient is feeling better. Shortness of breath seems to be improving. HISTORY OF PRESENT ILLNESS:      The patient is a 45 y.o.   male who is admitted to the hospital for dyspnea, he is found to have systolic heart failure with severe cardiomyopathy  Also CT scan showed multiple lung nodules concerning  Pt is seen and examined  CT abd and pelvis just done. , I reviewed the images, no clear mass or lesion seen, await report. Pt was relatively healthy without known medical issues. He underwent orchiectomy in 2012 due to ?torsion. Pt is seen and examined, he complains of dyspnea. No chest pain       Past Medical History:   has a past medical history of Hypertension and Testicular torsion. Past Surgical History:   has a past surgical history that includes Total shoulder arthroplasty (Left, 1995); Knee arthroscopy (Left, 2009); and Testicle removal (Left, 2012). Medications:    Reviewed in Epic     Allergies:  Patient has no known allergies. Social History:   reports that he has been smoking cigarettes. He has a 9.00 pack-year smoking history. He has never used smokeless tobacco. He reports current alcohol use of about 2.0 standard drinks of alcohol per week. He reports current drug use. Drug: Marijuana. Family History: family history includes Diabetes in his maternal grandmother. REVIEW OF SYSTEMS:    Constitutional: No fever or chills.  No night sweats, no weight loss   Eyes: No eye discharge, double vision, or eye pain   HEENT: negative for sore mouth, sore throat, hoarseness and voice change   Respiratory: dyspnea, cough. No hemoptysis   Cardiovascular: negative for chest pain, dyspnea, palpitations, orthopnea, PND   Gastrointestinal: negative for nausea, vomiting, diarrhea, constipation, abdominal pain, Dysphagia, hematemesis and hematochezia   Genitourinary: negative for frequency, dysuria, nocturia, urinary incontinence, and hematuria   Integument: negative for rash, skin lesions, bruises. Hematologic/Lymphatic: negative for easy bruising, bleeding, lymphadenopathy, or petechiae   Endocrine: negative for heat or cold intolerance,weight changes, change in bowel habits and hair loss   Musculoskeletal: negative for myalgias, arthralgias, pain, joint swelling,and bone pain   Neurological: negative for headaches, dizziness, seizures, weakness, numbness    PHYSICAL EXAM:      /82   Pulse 77   Temp 97.4 °F (36.3 °C) (Axillary)   Resp 20   Ht 5' 11\" (1.803 m)   Wt 249 lb 4 oz (113.1 kg)   SpO2 91%   BMI 34.76 kg/m²    Temp (24hrs), Av.7 °F (36.5 °C), Min:97.3 °F (36.3 °C), Max:98.1 °F (36.7 °C)    General appearance - ill appearing. Mental status - alert and cooperative   Eyes - pupils equal and reactive, extraocular eye movements intact   Ears - bilateral TM's and external ear canals normal   Mouth - mucous membranes moist, pharynx normal without lesions   Neck - supple, no significant adenopathy   Lymphatics - no palpable lymphadenopathy, no hepatosplenomegaly   Chest - bilateral crackles.    Heart - normal rate, regular rhythm, normal S1, S2, no murmurs  Abdomen - soft, nontender, nondistended, no masses or organomegaly   Neurological - alert, oriented, normal speech, no focal findings or movement disorder noted   Musculoskeletal - no joint tenderness, deformity or swelling   Extremities - peripheral pulses normal, no pedal edema, no clubbing or cyanosis   Skin - normal coloration and turgor, no rashes, no suspicious skin lesions noted ,    DATA:    Labs:   CBC:   Recent Labs     03/02/21  1342 03/03/21  0521   WBC 7.5 7.7   HGB 14.7 13.7   HCT 46.2 42.8    229     BMP:   Recent Labs     03/03/21  0521 03/04/21  0802    141   K 3.7 4.2   CO2 24 24   BUN 11 12   CREATININE 1.05 0.90   LABGLOM >60 >60   GLUCOSE 127* 115*     PT/INR: No results for input(s): PROTIME, INR in the last 72 hours. IMAGING DATA:      Primary Problem  Elevated troponin    Active Hospital Problems    Diagnosis Date Noted    Class 2 obesity due to excess calories without serious comorbidity with body mass index (BMI) of 35.0 to 35.9 in adult [E66.09, Z68.35] 03/02/2021    Pericardial effusion [I31.3] 03/02/2021    Bilateral pleural effusion [J90] 03/02/2021    Mediastinal adenopathy [R59.0] 03/02/2021    Mild dilation of ascending aorta ([address] [I77.810] 03/02/2021    Multiple pulmonary nodules [R91.8] 03/02/2021    Elevated troponin [R77.8] 03/02/2021    Congestive heart failure ([address] [I50.9] 03/02/2021    Essential hypertension [I10] 05/18/2018    Current every day smoker [F17.200] 05/18/2018    Marijuana smoker [F12.90] 05/18/2018         IMPRESSION:   1. Systolic heart failure, dilated cardiomyopathy, unknown etiology, possibly alcoholic   2. Exclude CAD  3. Alcoholic cirrhosis   4. Lung lesions. Too small for bx  5. Pleural effusion , likely due to CAD     RECOMMENDATIONS:  Continue supportive care  Plan PET CT scan as an outpatient  Discharge planning per primary service and cardiology. We will follow as an outpatient    Discussed with patient and Nurse. Thank you for asking us to see this patient.     SEVEN FARNSWORTH[facility] MD Jaron  Hematologist/Medical Oncologist  Cell: [phone]

## 2021-03-05 NOTE — PLAN OF CARE
Problem: Safety:  Goal: Free from accidental physical injury  Description: Free from accidental physical injury  3/5/2021 0538 by Lance Hernández RN  Outcome: Ongoing     Problem: Daily Care:  Goal: Daily care needs are met  Description: Daily care needs are met  3/5/2021 0538 by Lance Hernández RN  Outcome: Ongoing     Problem: Discharge Planning:  Goal: Patients continuum of care needs are met  Description: Patients continuum of care needs are met  3/5/2021 0538 by Lance Hernández RN  Outcome: Ongoing

## 2021-03-06 NOTE — PROGRESS NOTES
Today's Date: 3/5/2021  Patient Name: Harrison Anderson  Date of admission: 3/2/2021 12:41 PM  Patient's age: 45 y.o., 1980  Admission Dx: Elevated troponin [R77.8]    Reason for Consult: management recommendations  Requesting Physician: Eric Conti MD    CHIEF COMPLAINT:  Lung lesions, cardiomyopathy   Interval history  The patient is seen and evaluated. Recovered well. Plan is to discharge. We will follow as an outpatient    HISTORY OF PRESENT ILLNESS:      The patient is a 45 y.o.   male who is admitted to the hospital for dyspnea, he is found to have systolic heart failure with severe cardiomyopathy  Also CT scan showed multiple lung nodules concerning  Pt is seen and examined  CT abd and pelvis just done. , I reviewed the images, no clear mass or lesion seen, await report. Pt was relatively healthy without known medical issues. He underwent orchiectomy in 2012 due to ?torsion. Pt is seen and examined, he complains of dyspnea. No chest pain       Past Medical History:   has a past medical history of Hypertension and Testicular torsion. Past Surgical History:   has a past surgical history that includes Total shoulder arthroplasty (Left, 1995); Knee arthroscopy (Left, 2009); and Testicle removal (Left, 2012). Medications:    Reviewed in Epic     Allergies:  Patient has no known allergies. Social History:   reports that he has been smoking cigarettes. He has a 9.00 pack-year smoking history. He has never used smokeless tobacco. He reports current alcohol use of about 2.0 standard drinks of alcohol per week. He reports current drug use. Drug: Marijuana. Family History: family history includes Diabetes in his maternal grandmother. REVIEW OF SYSTEMS:    Constitutional: No fever or chills. No night sweats, no weight loss   Eyes: No eye discharge, double vision, or eye pain   HEENT: negative for sore mouth, sore throat, hoarseness and voice change   Respiratory: dyspnea, cough.  No hemoptysis   Cardiovascular: negative for chest pain, dyspnea, palpitations, orthopnea, PND   Gastrointestinal: negative for nausea, vomiting, diarrhea, constipation, abdominal pain, Dysphagia, hematemesis and hematochezia   Genitourinary: negative for frequency, dysuria, nocturia, urinary incontinence, and hematuria   Integument: negative for rash, skin lesions, bruises. Hematologic/Lymphatic: negative for easy bruising, bleeding, lymphadenopathy, or petechiae   Endocrine: negative for heat or cold intolerance,weight changes, change in bowel habits and hair loss   Musculoskeletal: negative for myalgias, arthralgias, pain, joint swelling,and bone pain   Neurological: negative for headaches, dizziness, seizures, weakness, numbness    PHYSICAL EXAM:      /88   Pulse 83   Temp 97.5 °F (36.4 °C) (Oral)   Resp 16   Ht 5' 11\" (1.803 m)   Wt 244 lb 12.8 oz (111 kg)   SpO2 95%   BMI 34.14 kg/m²    Temp (24hrs), Av.8 °F (36.6 °C), Min:97.4 °F (36.3 °C), Max:98.1 °F (36.7 °C)    General appearance - ill appearing. Mental status - alert and cooperative   Eyes - pupils equal and reactive, extraocular eye movements intact   Ears - bilateral TM's and external ear canals normal   Mouth - mucous membranes moist, pharynx normal without lesions   Neck - supple, no significant adenopathy   Lymphatics - no palpable lymphadenopathy, no hepatosplenomegaly   Chest - bilateral crackles.    Heart - normal rate, regular rhythm, normal S1, S2, no murmurs  Abdomen - soft, nontender, nondistended, no masses or organomegaly   Neurological - alert, oriented, normal speech, no focal findings or movement disorder noted   Musculoskeletal - no joint tenderness, deformity or swelling   Extremities - peripheral pulses normal, no pedal edema, no clubbing or cyanosis   Skin - normal coloration and turgor, no rashes, no suspicious skin lesions noted ,    DATA:    Labs:   CBC:   Recent Labs     21  0521   WBC 7.7   HGB 13.7   HCT 42.8        BMP:   Recent Labs     03/03/21  0521 03/04/21  0802    141   K 3.7 4.2   CO2 24 24   BUN 11 12   CREATININE 1.05 0.90   LABGLOM >60 >60   GLUCOSE 127* 115*     PT/INR: No results for input(s): PROTIME, INR in the last 72 hours. IMAGING DATA:      Primary Problem  Elevated troponin    Active Hospital Problems    Diagnosis Date Noted    Non-ischemic cardiomyopathy ([facility] [I42.8] 03/05/2021    Class 2 obesity due to excess calories without serious comorbidity with body mass index (BMI) of 35.0 to 35.9 in adult [E66.09, Z68.35] 03/02/2021    Pericardial effusion [I31.3] 03/02/2021    Bilateral pleural effusion [J90] 03/02/2021    Mediastinal adenopathy [R59.0] 03/02/2021    Mild dilation of ascending aorta ([facility] [I77.810] 03/02/2021    Multiple pulmonary nodules [R91.8] 03/02/2021    Elevated troponin [R77.8] 03/02/2021    Congestive heart failure ([facility] [I50.9] 03/02/2021    Essential hypertension [I10] 05/18/2018    Current every day smoker [F17.200] 05/18/2018    Marijuana smoker [F12.90] 05/18/2018         IMPRESSION:   1. Systolic heart failure, dilated cardiomyopathy, unknown etiology, possibly alcoholic   2. Exclude CAD  3. Alcoholic cirrhosis   4. Lung lesions. Too small for bx  5. Pleural effusion , likely due to CAD     RECOMMENDATIONS:  Continue supportive care  Plan PET CT scan as an outpatient  Okay to discharge from my perspective please call us as needed    Thank you for asking us to see this patient.     SEVEN CARLOS [facility] MD Jaron  Hematologist/Medical Oncologist  Cell: [phone]

## 2021-03-11 ENCOUNTER — HOSPITAL ENCOUNTER (OUTPATIENT)
Facility: MEDICAL CENTER | Age: 41
End: 2021-03-11

## 2021-03-11 ENCOUNTER — SOCIAL WORK (OUTPATIENT)
Dept: ONCOLOGY | Age: 41
End: 2021-03-11

## 2021-03-15 ENCOUNTER — OFFICE VISIT (OUTPATIENT)
Dept: ONCOLOGY | Age: 41
End: 2021-03-15
Payer: MEDICAID

## 2021-03-15 ENCOUNTER — TELEPHONE (OUTPATIENT)
Dept: ONCOLOGY | Age: 41
End: 2021-03-15

## 2021-03-15 VITALS
SYSTOLIC BLOOD PRESSURE: 113 MMHG | HEART RATE: 81 BPM | BODY MASS INDEX: 34.73 KG/M2 | TEMPERATURE: 97.6 F | WEIGHT: 248.1 LBS | DIASTOLIC BLOOD PRESSURE: 81 MMHG | HEIGHT: 71 IN

## 2021-03-15 DIAGNOSIS — R91.8 LUNG NODULES: Primary | ICD-10-CM

## 2021-03-15 DIAGNOSIS — I42.6 ALCOHOLIC CARDIOMYOPATHY (HCC): ICD-10-CM

## 2021-03-15 PROCEDURE — 99211 OFF/OP EST MAY X REQ PHY/QHP: CPT

## 2021-03-15 PROCEDURE — 99212 OFFICE O/P EST SF 10 MIN: CPT

## 2021-03-15 PROCEDURE — 99213 OFFICE O/P EST LOW 20 MIN: CPT | Performed by: INTERNAL MEDICINE

## 2021-03-15 NOTE — TELEPHONE ENCOUNTER
MAURECIO HERE FOR MD VISIT  RETURN IN LATE April  NEED CT SCAN W/O CONTRAST BEFORE NEXT VISIT  CT CHEST W/O CONTRAST WAS SCHEDULED ON 4/16/21 @ 1PM @ [address] ARRIVAL @ 12:45PM  MD VISIT 4/23/21 @ 11:15AM  AVS PRINTED W/ INSTRUCTIONS

## 2021-03-15 NOTE — PROGRESS NOTES
DIAGNOSIS:   Dilated cardiomyopathy and systolic heart failure  Multiple lung nodules  History of alcoholism  CURRENT THERAPY:  Cardiac catheterization did not show ischemic heart disease as a cause of his cardiomyopathy  Plan for work-up and possible biopsy of the lung nodules    BRIEF CASE HISTORY:  The patient is a 45 y.o.   male who is admitted to the hospital for dyspnea, he is found to have systolic heart failure with severe cardiomyopathy  Also CT scan showed multiple lung nodules concerning  Pt is seen and examined  CT abd and pelvis just done, I reviewed the images, no clear mass or lesion seen, await report. Pt was relatively healthy without known medical issues. He underwent orchiectomy in 2012 due to ?torsion. Pt is seen and examined, he complains of dyspnea. No chest pain   Cardiac catheterization was negative for coronary artery disease. The patient was diagnosed with dilated cardiomyopathy likely due to previous alcoholism. We decided to follow him as an outpatient with possible biopsy of the lung nodules. INTERIM HISTORY: The patient presents for follow up to review work up and discuss further recommendations. He has had improvement in shortness of breath and leg swelling, his energy has improved. He continues with medication and made dietary changes. He is in the process of insurance transition. Past Medical History:   has a past medical history of Hypertension and Testicular torsion. Past Surgical History:   has a past surgical history that includes Total shoulder arthroplasty (Left, 1995); Knee arthroscopy (Left, 2009); and Testicle removal (Left, 2012). Medications:    Reviewed in Epic     Allergies:  Patient has no known allergies. Social History:   reports that he has been smoking cigarettes. He has a 9.00 pack-year smoking history. He has never used smokeless tobacco. He reports current alcohol use of about 2.0 standard drinks of alcohol per week.  He reports current drug use. Drug: Marijuana. Family History: family history includes Diabetes in his maternal grandmother. REVIEW OF SYSTEMS:    Constitutional: No fever or chills. No night sweats, no weight loss   Eyes: No eye discharge, double vision, or eye pain   HEENT: negative for sore mouth, sore throat, hoarseness and voice change   Respiratory: dyspnea, cough. No hemoptysis   Cardiovascular: negative for chest pain, dyspnea, palpitations, orthopnea, PND   Gastrointestinal: negative for nausea, vomiting, diarrhea, constipation, abdominal pain, Dysphagia, hematemesis and hematochezia   Genitourinary: negative for frequency, dysuria, nocturia, urinary incontinence, and hematuria   Integument: negative for rash, skin lesions, bruises. Hematologic/Lymphatic: negative for easy bruising, bleeding, lymphadenopathy, or petechiae   Endocrine: negative for heat or cold intolerance,weight changes, change in bowel habits and hair loss   Musculoskeletal: negative for myalgias, arthralgias, pain, joint swelling,and bone pain   Neurological: negative for headaches, dizziness, seizures, weakness, numbness    PHYSICAL EXAM:      /81   Pulse 81   Temp 97.6 °F (36.4 °C)   Ht 5' 11\" (1.803 m)   Wt 248 lb 1.6 oz (112.5 kg)   BMI 34.60 kg/m²    Temp (24hrs), Av.8 °F (36.6 °C), Min:97.4 °F (36.3 °C), Max:98.1 °F (36.7 °C)    General appearance - ill appearing.    Mental status - alert and cooperative   Eyes - pupils equal and reactive, extraocular eye movements intact   Ears - bilateral TM's and external ear canals normal   Mouth - mucous membranes moist, pharynx normal without lesions   Neck - supple, no significant adenopathy   Lymphatics - no palpable lymphadenopathy, no hepatosplenomegaly   Chest - clear, no crackles or wheezing  Heart - normal rate, regular rhythm, normal S1, S2, no murmurs  Abdomen - soft, nontender, nondistended, no masses or organomegaly   Neurological - alert, oriented, normal speech, no focal findings or movement disorder noted   Musculoskeletal - no joint tenderness, deformity or swelling   Extremities - peripheral pulses normal, no pedal edema, no clubbing or cyanosis   Skin - normal coloration and turgor, no rashes, no suspicious skin lesions noted ,    REVIEW OF LABORATORY DATA:   Lab Results   Component Value Date    WBC 7.7 03/03/2021    HGB 13.7 03/03/2021    HCT 42.8 03/03/2021    MCV 85.6 03/03/2021     03/03/2021       Chemistry        Component Value Date/Time     03/04/2021 0802    K 4.2 03/04/2021 0802     03/04/2021 0802    CO2 24 03/04/2021 0802    BUN 12 03/04/2021 0802    CREATININE 0.90 03/04/2021 0802        Component Value Date/Time    CALCIUM 9.0 03/04/2021 0802    ALKPHOS 84 03/03/2021 0521    AST 17 03/03/2021 0521    ALT 20 03/03/2021 0521    BILITOT 0.56 03/03/2021 0521              REVIEW OF RADIOLOGICAL RESULTS:  CT scan of the chest, 3/2/2021  Impression   No evidence of pulmonary embolism.  Enlarged main pulmonary artery measuring   3.7 cm.  Query underlying pulmonary hypertension.       Numerous randomly distributed small solid noncalcified pulmonary nodules, the   largest measuring up to 7 mm, highly suspicious for pulmonary metastatic   disease.  Although the primary is unknown, correlate for prior history of   testicular cancer in the setting of previous left orchiectomy.       Pulmonary interstitial thickening which is somewhat irregular in appearance,   and as such suspicious for possible lymphangitic carcinomatosis, though   interstitial edema is a differential consideration.       Small right and trace left pleural effusions.       Abnormal mediastinal and hilar lymphadenopathy suspicious for phillip   metastatic disease.       Cardiomegaly with a moderate pericardial effusion.       Ascending aorta prominent at 4.5 cm.  Descending thoracic aortic   atherosclerotic disease, advanced for age.       Suggestion of possible left hydronephrosis as there is moderate left   perinephric stranding at the superior pole left kidney.  Correlate for any   abdominal pain and consider dedicated abdominal imaging to evaluate for any   retroperitoneal adenopathy or mass. CT abdomen and pelvis 3/3/2021  Impression   1.  No acute process, definite metastatic disease, or signs of primary   malignancy in the abdomen or pelvis.       2.  Hepatomegaly with coarsened attenuation of the liver, possibly related to   hepatocellular disease, congestion, or hepatic steatosis.       3.  Mild gallbladder wall thickening without cholelithiasis, which could also   be related to hepatic or cardiac disease.  If there is concern for acute   cholecystitis, consider ultrasound.       4.  Nondistended urinary bladder with mild wall thickening, nonspecific.       5.  Mildly prominent right external iliac chain lymph node, which is   nonspecific and could be reactive.  Otherwise no significant lymphadenopathy.       6.  Cardiomegaly, small pericardial effusion, small right pleural effusion,   and trace left pleural fluid are similar to the previous CT chest.  Mild   interlobular septal thickening and hazy ground-glass attenuation in the   visualized lung bases are also similar and suggestive of pulmonary edema. Mild atherosclerosis noted. Liver ultrasound 3/5/2021    Impression   Multiple cholelithiasis without ultrasound evidence cholecystitis or biliary   tree obstruction. IMPRESSION:   Systolic heart failure, dilated cardiomyopathy, unknown etiology, possibly alcoholic , coronary artery disease excluded  Alcoholic cirrhosis   Lung lesions. Multiple measuring up to 7 mm  Pleural effusion , likely due to CAD     PLAN:  We discussed his cardiac work up which was negative and reviewed findings done in house. We reviewed in detail updated imaging which showed numerous lung nodules and I discussed plan for repeat CT scan after recovery.   If the lung nodules persist, he will need a PET scan  He is well recovered with improvement with discharge, his cardiac evaluation will likely be done later on this year  We will repeat his CT scan in mid April  Return in 4-5 weeks.

## 2021-04-02 PROBLEM — R77.8 ELEVATED TROPONIN: Status: RESOLVED | Noted: 2021-03-02 | Resolved: 2021-04-02

## 2021-04-16 ENCOUNTER — HOSPITAL ENCOUNTER (OUTPATIENT)
Facility: MEDICAL CENTER | Age: 41
End: 2021-04-16
Payer: MEDICAID

## 2021-04-20 ENCOUNTER — HOSPITAL ENCOUNTER (OUTPATIENT)
Dept: CT IMAGING | Age: 41
Discharge: HOME | End: 2021-04-22
Payer: COMMERCIAL

## 2021-04-20 DIAGNOSIS — R91.8 LUNG NODULES: ICD-10-CM

## 2021-04-20 PROCEDURE — 71250 CT THORAX DX C-: CPT

## 2021-04-26 ENCOUNTER — HOSPITAL ENCOUNTER (OUTPATIENT)
Facility: MEDICAL CENTER | Age: 41
End: 2021-04-26
Payer: MEDICAID

## 2021-05-03 ENCOUNTER — OFFICE VISIT (OUTPATIENT)
Dept: ONCOLOGY | Age: 41
End: 2021-05-03
Payer: MEDICAID

## 2021-05-03 ENCOUNTER — TELEPHONE (OUTPATIENT)
Dept: ONCOLOGY | Age: 41
End: 2021-05-03

## 2021-05-03 VITALS
BODY MASS INDEX: 34.84 KG/M2 | WEIGHT: 249.8 LBS | DIASTOLIC BLOOD PRESSURE: 79 MMHG | TEMPERATURE: 98.1 F | RESPIRATION RATE: 18 BRPM | HEART RATE: 76 BPM | SYSTOLIC BLOOD PRESSURE: 123 MMHG

## 2021-05-03 DIAGNOSIS — R91.8 LUNG NODULES: Primary | ICD-10-CM

## 2021-05-03 DIAGNOSIS — I42.6 ALCOHOLIC CARDIOMYOPATHY (HCC): ICD-10-CM

## 2021-05-03 PROCEDURE — 99211 OFF/OP EST MAY X REQ PHY/QHP: CPT | Performed by: INTERNAL MEDICINE

## 2021-05-03 PROCEDURE — G8427 DOCREV CUR MEDS BY ELIG CLIN: HCPCS | Performed by: INTERNAL MEDICINE

## 2021-05-03 PROCEDURE — G8417 CALC BMI ABV UP PARAM F/U: HCPCS | Performed by: INTERNAL MEDICINE

## 2021-05-03 PROCEDURE — 99214 OFFICE O/P EST MOD 30 MIN: CPT | Performed by: INTERNAL MEDICINE

## 2021-05-03 PROCEDURE — 4004F PT TOBACCO SCREEN RCVD TLK: CPT | Performed by: INTERNAL MEDICINE

## 2021-05-03 NOTE — PROGRESS NOTES
DIAGNOSIS:   Dilated cardiomyopathy and systolic heart failure  Multiple lung nodules  History of alcoholism  CURRENT THERAPY:  Cardiac catheterization did not show ischemic heart disease as a cause of his cardiomyopathy  Surveillance     BRIEF CASE HISTORY:  The patient is a 45 y.o.   male who is admitted to the hospital for dyspnea, he is found to have systolic heart failure with severe cardiomyopathy  Also CT scan showed multiple lung nodules concerning  Pt is seen and examined  CT abd and pelvis just done, I reviewed the images, no clear mass or lesion seen, await report. Pt was relatively healthy without known medical issues. He underwent orchiectomy in 2012 due to ?torsion. Pt is seen and examined, he complains of dyspnea. No chest pain   Cardiac catheterization was negative for coronary artery disease. The patient was diagnosed with dilated cardiomyopathy likely due to previous alcoholism. We decided to follow him as an outpatient with possible biopsy of the lung nodules. INTERIM HISTORY: The patient presents for follow up to review CT and discuss further recommendations. He has discontinued drinking alcohol and has made dietary changes     Past Medical History:   has a past medical history of Hypertension and Testicular torsion. Past Surgical History:   has a past surgical history that includes Total shoulder arthroplasty (Left, 1995); Knee arthroscopy (Left, 2009); and Testicle removal (Left, 2012). Medications:    Reviewed in Epic     Allergies:  Patient has no known allergies. Social History:   reports that he has been smoking cigarettes. He has a 9.00 pack-year smoking history. He has never used smokeless tobacco. He reports current alcohol use of about 2.0 standard drinks of alcohol per week. He reports current drug use. Drug: Marijuana. Family History: family history includes Diabetes in his maternal grandmother. REVIEW OF SYSTEMS:    Constitutional: No fever or chills. No night sweats, no weight loss   Eyes: No eye discharge, double vision, or eye pain   HEENT: negative for sore mouth, sore throat, hoarseness and voice change   Respiratory: dyspnea, cough. No hemoptysis   Cardiovascular: negative for chest pain, dyspnea, palpitations, orthopnea, PND   Gastrointestinal: negative for nausea, vomiting, diarrhea, constipation, abdominal pain, Dysphagia, hematemesis and hematochezia   Genitourinary: negative for frequency, dysuria, nocturia, urinary incontinence, and hematuria   Integument: negative for rash, skin lesions, bruises. Hematologic/Lymphatic: negative for easy bruising, bleeding, lymphadenopathy, or petechiae   Endocrine: negative for heat or cold intolerance,weight changes, change in bowel habits and hair loss   Musculoskeletal: negative for myalgias, arthralgias, pain, joint swelling,and bone pain   Neurological: negative for headaches, dizziness, seizures, weakness, numbness    PHYSICAL EXAM:      /79 (Site: Right Upper Arm, Position: Sitting)   Pulse 76   Temp 98.1 °F (36.7 °C) (Oral)   Resp 18   Wt 249 lb 12.8 oz (113.3 kg)   BMI 34.84 kg/m²    Temp (24hrs), Av.8 °F (36.6 °C), Min:97.4 °F (36.3 °C), Max:98.1 °F (36.7 °C)    General appearance - ill appearing.    Mental status - alert and cooperative   Eyes - pupils equal and reactive, extraocular eye movements intact   Ears - bilateral TM's and external ear canals normal   Mouth - mucous membranes moist, pharynx normal without lesions   Neck - supple, no significant adenopathy   Lymphatics - no palpable lymphadenopathy, no hepatosplenomegaly   Chest - clear, no crackles or wheezing  Heart - normal rate, regular rhythm, normal S1, S2, no murmurs  Abdomen - soft, nontender, nondistended, no masses or organomegaly   Neurological - alert, oriented, normal speech, no focal findings or movement disorder noted   Musculoskeletal - no joint tenderness, deformity or swelling   Extremities - peripheral pulses

## 2021-05-03 NOTE — TELEPHONE ENCOUNTER
Mansoor Zapata MD VISIT  RV IN 1 YR W/ CT SCAN PRIOR TO RV  CT SCAN TO BE SCHEDULED IN 5/2022  MD VISIT 5/2022  AVS PRINTED W/ INSTRUCTIONS AND GIVEN TO PT ON EXIT

## 2021-09-15 ENCOUNTER — APPOINTMENT (OUTPATIENT)
Dept: GENERAL RADIOLOGY | Age: 41
End: 2021-09-15
Payer: COMMERCIAL

## 2021-09-15 ENCOUNTER — HOSPITAL ENCOUNTER (EMERGENCY)
Age: 41
Discharge: HOME | End: 2021-09-15
Attending: EMERGENCY MEDICINE
Payer: COMMERCIAL

## 2021-09-15 ENCOUNTER — APPOINTMENT (OUTPATIENT)
Dept: CT IMAGING | Age: 41
End: 2021-09-15
Payer: COMMERCIAL

## 2021-09-15 VITALS
BODY MASS INDEX: 35.21 KG/M2 | RESPIRATION RATE: 14 BRPM | WEIGHT: 260 LBS | HEART RATE: 93 BPM | TEMPERATURE: 97.9 F | DIASTOLIC BLOOD PRESSURE: 141 MMHG | OXYGEN SATURATION: 96 % | HEIGHT: 72 IN | SYSTOLIC BLOOD PRESSURE: 213 MMHG

## 2021-09-15 DIAGNOSIS — I10 ESSENTIAL HYPERTENSION: ICD-10-CM

## 2021-09-15 DIAGNOSIS — R07.89 ATYPICAL CHEST PAIN: Primary | ICD-10-CM

## 2021-09-15 DIAGNOSIS — K29.00 ACUTE GASTRITIS WITHOUT HEMORRHAGE, UNSPECIFIED GASTRITIS TYPE: ICD-10-CM

## 2021-09-15 LAB
ABSOLUTE EOS #: 0.1 K/UL (ref 0–0.44)
ABSOLUTE IMMATURE GRANULOCYTE: 0.04 K/UL (ref 0–0.3)
ABSOLUTE LYMPH #: 2.37 K/UL (ref 1.1–3.7)
ABSOLUTE MONO #: 0.65 K/UL (ref 0.1–1.2)
ALBUMIN SERPL-MCNC: 4.3 G/DL (ref 3.5–5.2)
ALBUMIN/GLOBULIN RATIO: ABNORMAL (ref 1–2.5)
ALP BLD-CCNC: 125 U/L (ref 40–129)
ALT SERPL-CCNC: 24 U/L (ref 5–41)
ANION GAP SERPL CALCULATED.3IONS-SCNC: 12 MMOL/L (ref 9–17)
AST SERPL-CCNC: 29 U/L
BASOPHILS # BLD: 0 % (ref 0–2)
BASOPHILS ABSOLUTE: 0.03 K/UL (ref 0–0.2)
BILIRUB SERPL-MCNC: 0.4 MG/DL (ref 0.3–1.2)
BUN BLDV-MCNC: 6 MG/DL (ref 6–20)
BUN/CREAT BLD: 7 (ref 9–20)
CALCIUM SERPL-MCNC: 9 MG/DL (ref 8.6–10.4)
CHLORIDE BLD-SCNC: 102 MMOL/L (ref 98–107)
CO2: 24 MMOL/L (ref 20–31)
CREAT SERPL-MCNC: 0.84 MG/DL (ref 0.7–1.2)
DIFFERENTIAL TYPE: NORMAL
EKG ATRIAL RATE: 96 BPM
EKG P AXIS: 9 DEGREES
EKG P-R INTERVAL: 178 MS
EKG Q-T INTERVAL: 394 MS
EKG QRS DURATION: 108 MS
EKG QTC CALCULATION (BAZETT): 497 MS
EKG R AXIS: -40 DEGREES
EKG T AXIS: 14 DEGREES
EKG VENTRICULAR RATE: 96 BPM
EOSINOPHILS RELATIVE PERCENT: 1 % (ref 1–4)
GFR AFRICAN AMERICAN: >60 ML/MIN
GFR NON-AFRICAN AMERICAN: >60 ML/MIN
GFR SERPL CREATININE-BSD FRML MDRD: ABNORMAL ML/MIN/{1.73_M2}
GFR SERPL CREATININE-BSD FRML MDRD: ABNORMAL ML/MIN/{1.73_M2}
GLUCOSE BLD-MCNC: 122 MG/DL (ref 70–99)
HCT VFR BLD CALC: 45.3 % (ref 40.7–50.3)
HEMOGLOBIN: 14.7 G/DL (ref 13–17)
IMMATURE GRANULOCYTES: 0 %
LYMPHOCYTES # BLD: 27 % (ref 24–43)
MAGNESIUM: 1.9 MG/DL (ref 1.6–2.6)
MCH RBC QN AUTO: 27.5 PG (ref 25.2–33.5)
MCHC RBC AUTO-ENTMCNC: 32.5 G/DL (ref 28.4–34.8)
MCV RBC AUTO: 84.8 FL (ref 82.6–102.9)
MONOCYTES # BLD: 7 % (ref 3–12)
NRBC AUTOMATED: 0 PER 100 WBC
PDW BLD-RTO: 14.4 % (ref 11.8–14.4)
PLATELET # BLD: 247 K/UL (ref 138–453)
PLATELET ESTIMATE: NORMAL
PMV BLD AUTO: 10 FL (ref 8.1–13.5)
POTASSIUM SERPL-SCNC: 3.5 MMOL/L (ref 3.7–5.3)
RBC # BLD: 5.34 M/UL (ref 4.21–5.77)
RBC # BLD: NORMAL 10*6/UL
SEG NEUTROPHILS: 65 % (ref 36–65)
SEGMENTED NEUTROPHILS ABSOLUTE COUNT: 5.7 K/UL (ref 1.5–8.1)
SODIUM BLD-SCNC: 138 MMOL/L (ref 135–144)
TOTAL PROTEIN: 7.2 G/DL (ref 6.4–8.3)
TROPONIN INTERP: NORMAL
TROPONIN INTERP: NORMAL
TROPONIN T: NORMAL NG/ML
TROPONIN T: NORMAL NG/ML
TROPONIN, HIGH SENSITIVITY: 11 NG/L (ref 0–22)
TROPONIN, HIGH SENSITIVITY: 8 NG/L (ref 0–22)
WBC # BLD: 8.9 K/UL (ref 3.5–11.3)
WBC # BLD: NORMAL 10*3/UL

## 2021-09-15 PROCEDURE — 96374 THER/PROPH/DIAG INJ IV PUSH: CPT

## 2021-09-15 PROCEDURE — 6360000004 HC RX CONTRAST MEDICATION: Performed by: EMERGENCY MEDICINE

## 2021-09-15 PROCEDURE — 99283 EMERGENCY DEPT VISIT LOW MDM: CPT

## 2021-09-15 PROCEDURE — 96375 TX/PRO/DX INJ NEW DRUG ADDON: CPT

## 2021-09-15 PROCEDURE — 71045 X-RAY EXAM CHEST 1 VIEW: CPT

## 2021-09-15 PROCEDURE — 80053 COMPREHEN METABOLIC PANEL: CPT

## 2021-09-15 PROCEDURE — 84484 ASSAY OF TROPONIN QUANT: CPT

## 2021-09-15 PROCEDURE — 2500000003 HC RX 250 WO HCPCS: Performed by: EMERGENCY MEDICINE

## 2021-09-15 PROCEDURE — 74177 CT ABD & PELVIS W/CONTRAST: CPT

## 2021-09-15 PROCEDURE — 83735 ASSAY OF MAGNESIUM: CPT

## 2021-09-15 PROCEDURE — 93005 ELECTROCARDIOGRAM TRACING: CPT | Performed by: EMERGENCY MEDICINE

## 2021-09-15 PROCEDURE — 2580000003 HC RX 258: Performed by: EMERGENCY MEDICINE

## 2021-09-15 PROCEDURE — 96372 THER/PROPH/DIAG INJ SC/IM: CPT

## 2021-09-15 PROCEDURE — 6370000000 HC RX 637 (ALT 250 FOR IP): Performed by: EMERGENCY MEDICINE

## 2021-09-15 PROCEDURE — 6360000002 HC RX W HCPCS: Performed by: EMERGENCY MEDICINE

## 2021-09-15 PROCEDURE — 85025 COMPLETE CBC W/AUTO DIFF WBC: CPT

## 2021-09-15 RX ORDER — 0.9 % SODIUM CHLORIDE 0.9 %
1000 INTRAVENOUS SOLUTION INTRAVENOUS ONCE
Status: COMPLETED | OUTPATIENT
Start: 2021-09-15 | End: 2021-09-15

## 2021-09-15 RX ORDER — SODIUM CHLORIDE 0.9 % (FLUSH) 0.9 %
10 SYRINGE (ML) INJECTION ONCE
Status: COMPLETED | OUTPATIENT
Start: 2021-09-15 | End: 2021-09-15

## 2021-09-15 RX ORDER — ENALAPRIL MALEATE 10 MG/1
10 TABLET ORAL DAILY
Qty: 30 TABLET | Refills: 3 | Status: ON HOLD | OUTPATIENT
Start: 2021-09-15 | End: 2022-10-27 | Stop reason: SDUPTHER

## 2021-09-15 RX ORDER — ONDANSETRON 2 MG/ML
4 INJECTION INTRAMUSCULAR; INTRAVENOUS ONCE
Status: COMPLETED | OUTPATIENT
Start: 2021-09-15 | End: 2021-09-15

## 2021-09-15 RX ORDER — HYDROCHLOROTHIAZIDE 25 MG/1
25 TABLET ORAL DAILY
Qty: 30 TABLET | Refills: 3 | Status: ON HOLD | OUTPATIENT
Start: 2021-09-15 | End: 2022-02-02 | Stop reason: HOSPADM

## 2021-09-15 RX ORDER — DICYCLOMINE HYDROCHLORIDE 10 MG/ML
20 INJECTION INTRAMUSCULAR ONCE
Status: COMPLETED | OUTPATIENT
Start: 2021-09-15 | End: 2021-09-15

## 2021-09-15 RX ORDER — KETOROLAC TROMETHAMINE 15 MG/ML
15 INJECTION, SOLUTION INTRAMUSCULAR; INTRAVENOUS ONCE
Status: COMPLETED | OUTPATIENT
Start: 2021-09-15 | End: 2021-09-15

## 2021-09-15 RX ORDER — CARVEDILOL 12.5 MG/1
12.5 TABLET ORAL 2 TIMES DAILY WITH MEALS
Qty: 60 TABLET | Refills: 3 | Status: SHIPPED | OUTPATIENT
Start: 2021-09-15 | End: 2022-03-02 | Stop reason: DRUGHIGH

## 2021-09-15 RX ORDER — HYDROCHLOROTHIAZIDE 25 MG/1
25 TABLET ORAL DAILY
Status: DISCONTINUED | OUTPATIENT
Start: 2021-09-15 | End: 2021-09-15 | Stop reason: HOSPADM

## 2021-09-15 RX ORDER — 0.9 % SODIUM CHLORIDE 0.9 %
80 INTRAVENOUS SOLUTION INTRAVENOUS ONCE
Status: COMPLETED | OUTPATIENT
Start: 2021-09-15 | End: 2021-09-15

## 2021-09-15 RX ORDER — CARVEDILOL 3.12 MG/1
12.5 TABLET ORAL 2 TIMES DAILY WITH MEALS
Status: DISCONTINUED | OUTPATIENT
Start: 2021-09-15 | End: 2021-09-15 | Stop reason: HOSPADM

## 2021-09-15 RX ORDER — ENALAPRIL MALEATE 5 MG/1
10 TABLET ORAL DAILY
Status: DISCONTINUED | OUTPATIENT
Start: 2021-09-15 | End: 2021-09-15 | Stop reason: HOSPADM

## 2021-09-15 RX ADMIN — SODIUM CHLORIDE, PRESERVATIVE FREE 10 ML: 5 INJECTION INTRAVENOUS at 07:21

## 2021-09-15 RX ADMIN — ENALAPRIL MALEATE 10 MG: 5 TABLET ORAL at 09:01

## 2021-09-15 RX ADMIN — IOPAMIDOL 75 ML: 755 INJECTION, SOLUTION INTRAVENOUS at 07:21

## 2021-09-15 RX ADMIN — FAMOTIDINE 20 MG: 10 INJECTION, SOLUTION INTRAVENOUS at 06:22

## 2021-09-15 RX ADMIN — SODIUM CHLORIDE 80 ML: 9 INJECTION, SOLUTION INTRAVENOUS at 07:21

## 2021-09-15 RX ADMIN — CARVEDILOL 12.5 MG: 3.12 TABLET, FILM COATED ORAL at 09:01

## 2021-09-15 RX ADMIN — SODIUM CHLORIDE 1000 ML: 9 INJECTION, SOLUTION INTRAVENOUS at 06:22

## 2021-09-15 RX ADMIN — HYDROCHLOROTHIAZIDE 25 MG: 25 TABLET ORAL at 09:01

## 2021-09-15 RX ADMIN — MAGNESIUM HYDROXIDE/ALUMINUM HYDROXICE/SIMETHICONE: 120; 1200; 1200 SUSPENSION ORAL at 06:21

## 2021-09-15 RX ADMIN — KETOROLAC TROMETHAMINE 15 MG: 15 INJECTION, SOLUTION INTRAMUSCULAR; INTRAVENOUS at 06:22

## 2021-09-15 RX ADMIN — ONDANSETRON 4 MG: 2 INJECTION INTRAMUSCULAR; INTRAVENOUS at 06:22

## 2021-09-15 RX ADMIN — DICYCLOMINE HYDROCHLORIDE 20 MG: 20 INJECTION, SOLUTION INTRAMUSCULAR at 08:49

## 2021-09-15 ASSESSMENT — PAIN SCALES - GENERAL
PAINLEVEL_OUTOF10: 8
PAINLEVEL_OUTOF10: 8

## 2021-09-15 NOTE — LETTER
[facility] ED  [address] [place] [address]  Phone: [phone]             September 15, 2021    Patient: Parul Valdes   YOB: 1980   Date of Visit: 9/15/2021       To Whom It May Concern:    Ajit Sequeira was seen and treated in our emergency department on 9/15/2021. He may return to work on 9/16/2021.       Sincerely,             Signature:__________________________________

## 2021-09-15 NOTE — ED PROVIDER NOTES
[facility] ED  EMERGENCY DEPARTMENT ENCOUNTER   ATTENDING ATTESTATION     Pt Name: Orvel Babinski  MRN: [id number]  [place] 1980  Date of evaluation: 9/15/21       Orvel Babinski is a 37 y.o. male who presents with Chest Pain      MDM:     SO Dr Kimmy Souza    18-year-old male with a history of left ventricular systolic dysfunction, EF of 15%, pulmonary hypertension. Patient presented with some abdominal pain, questionable atypical chest pain. 9:29 AM EDT  Patient's laboratory studies are unremarkable, his CT scan was unremarkable as well except for possibly some mild gastritis. This would account for the patient's epigastric pain. The patient has elevated blood pressure, he has not been compliant with his blood pressure medications. The patient was given his oral blood pressure medications here, he continues to have significantly elevated diastolic blood pressures. For this reason I recommended the patient admit to the hospital for further blood pressure management, he was made aware of the risk of stroke, heart attack, kidney dysfunction and death. Patient is awake and alert, aware of these risks and wishes to be discharged with outpatient follow-up, he will take his blood pressure medications which I have refilled and follow-up with his PCP.       Impression: Epigastric pain, gastritis, hypertension    Vitals:   Vitals:    09/15/21 0700 09/15/21 0715 09/15/21 0802 09/15/21 0905   BP: (!) 192/129 (!) 185/127 (!) 211/134 (!) 213/141   Pulse: 91 86 93    Resp: 15 14     Temp:       TempSrc:       SpO2: 94% 96%     Weight:       Height:                 Mariel Chirinos MD  Attending Emergency  Physician                  Kenan Zaman MD  09/15/21 8610

## 2021-09-15 NOTE — ED PROVIDER NOTES
EMERGENCY DEPARTMENT ENCOUNTER    Pt Name: Dana Suarez  MRN: [id number]  [place] 1980  Date of evaluation: 9/15/21  CHIEF COMPLAINT       Chief Complaint   Patient presents with    Chest Pain     HISTORY OF PRESENT ILLNESS   Patient is a 61-year-old male who presents the ED for evaluation of abdominal pain. Pain woke him up from sleep at approximately 3:30. Pain located across abdomen and radiates up to the epigastric area. No vomiting or diaphoresis. Patient reports normal bowel movement. He did not try to take anything for pain. Upon arrival to work this morning pain and not resolved so he came to the ED for further evaluation. He had echo cardiogram on no other issues at this time. No fevers, cough, shortness of breath, chest pain, abdominal pain, nausea, vomiting, changes in urine or stool. Cath lab: 3/4/21 -severe LV systolic dysfunction with LVEF of 15%. Dilated aortic root and sinuses of Valsalva noted. Mild pulmonary hypertension noted on right heart cath. REVIEW OF SYSTEMS     Review of Systems   All other systems reviewed and are negative.     PASTMEDICAL HISTORY     Past Medical History:   Diagnosis Date    Hypertension     on 5/15/18 pt states he has never had medication prescribed    Testicular torsion 2012    s/p Left orchiectomy     SURGICAL HISTORY       Past Surgical History:   Procedure Laterality Date    KNEE ARTHROSCOPY Left 2009    [facility]    SHOULDER ARTHROPLASTY Left 1995    [facility]    TESTICLE REMOVAL Left 2012     CURRENT MEDICATIONS       Previous Medications    ATORVASTATIN (LIPITOR) 40 MG TABLET    Take 1 tablet by mouth nightly    CARVEDILOL (COREG) 12.5 MG TABLET    Take 1 tablet by mouth 2 times daily (with meals)    ENALAPRIL (VASOTEC) 10 MG TABLET    Take 1 tablet by mouth daily    FUROSEMIDE (LASIX) 80 MG TABLET    Take 1 tablet by mouth daily    HYDROCHLOROTHIAZIDE (HYDRODIURIL) 25 MG TABLET    Take 1 tablet by mouth daily    POTASSIUM CHLORIDE DIAGNOSTIC RESULTS   EKG:All EKG's are interpreted by the Emergency Department Physician who either signs or Co-signs this chart in the absence of a cardiologist.        RADIOLOGY:All plain film, CT, MRI, and formal ultrasound images (except ED bedside ultrasound) are read by the radiologist, see reports below, unless otherwisenoted in MDM or here. XR CHEST PORTABLE    (Results Pending)     LABS: All lab results were reviewed by myself, and all abnormals are listed below. Labs Reviewed   COMPREHENSIVE METABOLIC PANEL W/ REFLEX TO MG FOR LOW K - Abnormal; Notable for the following components:       Result Value    Glucose 122 (*)     Bun/Cre Ratio 7 (*)     All other components within normal limits   CBC WITH AUTO DIFFERENTIAL   TROPONIN       EMERGENCY DEPARTMENTCOURSE:   Patient signed out to Dr. Lucy Sweet. Refer to his note for diagnosis and disposition. Vitals:    Vitals:    09/15/21 0558 09/15/21 0600 09/15/21 0616   BP: (!) 193/132 (!) 192/128    Pulse: 94 90 95   Resp: 14 12 13   Temp: 97.9 °F (36.6 °C)     TempSrc: Oral     SpO2: 96% 96% 98%   Weight: 260 lb (117.9 kg)     Height: 6' (1.829 m)         The patient was given the following medications while in the emergency department:  Orders Placed This Encounter   Medications    0.9 % sodium chloride bolus    ondansetron (ZOFRAN) injection 4 mg    famotidine (PEPCID) injection 20 mg    aluminum & magnesium hydroxide-simethicone (MAALOX) 30 mL, lidocaine viscous hcl (XYLOCAINE) 5 mL (GI COCKTAIL)    ketorolac (TORADOL) injection 15 mg     CONSULTS:  None    FINAL IMPRESSION      1. Atypical chest pain          DISPOSITION/PLAN   DISPOSITION        PATIENT REFERRED TO:  No follow-up provider specified.   DISCHARGE MEDICATIONS:  New Prescriptions    No medications on file     Milton Cano MD  Attending Emergency Physician                    Luz Santana MD  09/15/21 5516

## 2021-09-16 ENCOUNTER — HOSPITAL ENCOUNTER (EMERGENCY)
Age: 41
Discharge: HOME | End: 2021-09-16
Attending: EMERGENCY MEDICINE
Payer: COMMERCIAL

## 2021-09-16 VITALS
SYSTOLIC BLOOD PRESSURE: 110 MMHG | WEIGHT: 260 LBS | DIASTOLIC BLOOD PRESSURE: 84 MMHG | TEMPERATURE: 97.7 F | OXYGEN SATURATION: 97 % | RESPIRATION RATE: 16 BRPM | HEART RATE: 80 BPM | HEIGHT: 72 IN | BODY MASS INDEX: 35.21 KG/M2

## 2021-09-16 DIAGNOSIS — R53.83 FATIGUE, UNSPECIFIED TYPE: Primary | ICD-10-CM

## 2021-09-16 PROCEDURE — 99282 EMERGENCY DEPT VISIT SF MDM: CPT

## 2021-09-16 ASSESSMENT — ENCOUNTER SYMPTOMS
CHEST TIGHTNESS: 0
FACIAL SWELLING: 0
EYE PAIN: 0
EYE DISCHARGE: 0
ABDOMINAL DISTENTION: 0
BACK PAIN: 0
ABDOMINAL PAIN: 0
SHORTNESS OF BREATH: 0

## 2021-09-16 NOTE — ED PROVIDER NOTES
EMERGENCY DEPARTMENT ENCOUNTER    Pt Name: Thelma Odonnell  MRN: [id number]  [place] 1980  Date of evaluation: 9/16/21  CHIEF COMPLAINT       Chief Complaint   Patient presents with    Fatigue     started yesterday     HISTORY OF PRESENT ILLNESS   HPI   Patient is a 60-year-old male who presented to the emergency department secondary to fatigue. Patient was seen yesterday secondary to similar symptomology discharged home with outpatient follow-up and given the option for work note. Patient said he went to work today however he had increased fatigue he denied chest pain, shortness of breath, nausea, vomiting, fevers or chills. REVIEW OF SYSTEMS     Review of Systems   Constitutional: Positive for fatigue. Negative for chills, diaphoresis and fever. HENT: Negative for congestion, ear pain and facial swelling. Eyes: Negative for pain, discharge and visual disturbance. Respiratory: Negative for chest tightness and shortness of breath. Cardiovascular: Negative for chest pain and palpitations. Gastrointestinal: Negative for abdominal distention and abdominal pain. Genitourinary: Negative for difficulty urinating and flank pain. Musculoskeletal: Negative for back pain. Skin: Negative for wound. Neurological: Negative for dizziness, light-headedness and headaches.      PASTMEDICAL HISTORY     Past Medical History:   Diagnosis Date    Hypertension     on 5/15/18 pt states he has never had medication prescribed    Testicular torsion 2012    s/p Left orchiectomy     SURGICAL HISTORY       Past Surgical History:   Procedure Laterality Date    KNEE ARTHROSCOPY Left 2009    [facility]    SHOULDER ARTHROPLASTY Left 1995    [facility]    TESTICLE REMOVAL Left 2012     CURRENT MEDICATIONS       Previous Medications    ATORVASTATIN (LIPITOR) 40 MG TABLET    Take 1 tablet by mouth nightly    CARVEDILOL (COREG) 12.5 MG TABLET    Take 1 tablet by mouth 2 times daily (with meals)    ENALAPRIL (VASOTEC) 10 MG TABLET    Take 1 tablet by mouth daily    FUROSEMIDE (LASIX) 80 MG TABLET    Take 1 tablet by mouth daily    HYDROCHLOROTHIAZIDE (HYDRODIURIL) 25 MG TABLET    Take 1 tablet by mouth daily    POTASSIUM CHLORIDE (KLOR-CON M) 10 MEQ EXTENDED RELEASE TABLET    Take 1 tablet by mouth daily     ALLERGIES     has No Known Allergies. FAMILY HISTORY     He indicated that the status of his maternal grandmother is unknown. He indicated that the status of his neg hx is unknown. SOCIAL HISTORY       Social History     Tobacco Use    Smoking status: Current Every Day Smoker     Packs/day: 0.50     Years: 18.00     Pack years: 9.00     Types: Cigarettes    Smokeless tobacco: Never Used   Vaping Use    Vaping Use: Former    Substances: Never   Substance Use Topics    Alcohol use: Yes     Alcohol/week: 2.0 standard drinks     Types: 2 Cans of beer per week     Comment: 2 cans of beer daily    Drug use: Yes     Types: Marijuana     Comment: on 5/15/18 last used today     PHYSICAL EXAM     INITIAL VITALS: /84   Pulse 80   Temp 97.7 °F (36.5 °C) (Oral)   Resp 16   Ht 6' (1.829 m)   Wt 260 lb (117.9 kg)   SpO2 97%   BMI 35.26 kg/m²    Physical Exam  Vitals and nursing note reviewed. Constitutional:       General: He is not in acute distress. Appearance: He is well-developed. He is not diaphoretic. HENT:      Head: Normocephalic and atraumatic. Eyes:      Pupils: Pupils are equal, round, and reactive to light. Cardiovascular:      Rate and Rhythm: Normal rate and regular rhythm. Pulmonary:      Effort: Pulmonary effort is normal.      Breath sounds: Normal breath sounds. Abdominal:      General: Bowel sounds are normal.      Palpations: Abdomen is soft. Musculoskeletal:         General: Normal range of motion. Cervical back: Normal range of motion and neck supple. Skin:     General: Skin is warm. Capillary Refill: Capillary refill takes less than 2 seconds.    Neurological: Mental Status: He is alert and oriented to person, place, and time. MEDICAL DECISION MAKING:   Patient is a 43-year-old male who presented to the emergency department secondary to fatigue. Patient declined further work-up and requested a work note. Patient is given a work note continue outpatient follow-up and parameters to return to the emergency department         All patient's question's and concerns were answered prior to disposition and patient and/or family expressed understanding and agreement of treatment plan. CRITICAL CARE:              NIH STROKE SCALE:            PROCEDURES:    Procedures    DIAGNOSTIC RESULTS   EKG:All EKG's are interpreted by the Emergency Department Physician who either signs or Co-signs this chart in the absence of a cardiologist.        RADIOLOGY:All plain film, CT, MRI, and formal ultrasound images (except ED bedside ultrasound) are read by the radiologist, see reports below, unless otherwisenoted in MDM or here. No orders to display     LABS: All lab results were reviewed by myself, and all abnormals are listed below. Labs Reviewed - No data to display    EMERGENCY DEPARTMENTCOURSE:         Vitals:    Vitals:    09/16/21 0836   BP: 110/84   Pulse: 80   Resp: 16   Temp: 97.7 °F (36.5 °C)   TempSrc: Oral   SpO2: 97%   Weight: 260 lb (117.9 kg)   Height: 6' (1.829 m)       The patient was given the following medications while in the emergency department:  No orders of the defined types were placed in this encounter. CONSULTS:  None    FINAL IMPRESSION      1.  Fatigue, unspecified type          DISPOSITION/PLAN   DISPOSITION Decision To Discharge 09/16/2021 08:54:03 AM      PATIENT REFERRED TO:  Sonam Ivy MD  [address] [place] [address]  [phone]          DISCHARGE MEDICATIONS:  New Prescriptions    No medications on file     Mani Najera MD  Attending Emergency Physician    This note was created with the assistance of a speech-recognition program. While intending to generate a document that actually reflects the content of the visit, no guarantees can be provided that every mistake has been identified and corrected by editing.                     Vel Melvin MD  99/33/51 [id number]

## 2021-11-04 ENCOUNTER — HOSPITAL ENCOUNTER (EMERGENCY)
Age: 41
Discharge: HOME | End: 2021-11-04
Attending: EMERGENCY MEDICINE
Payer: COMMERCIAL

## 2021-11-04 ENCOUNTER — APPOINTMENT (OUTPATIENT)
Dept: GENERAL RADIOLOGY | Age: 41
End: 2021-11-04
Payer: COMMERCIAL

## 2021-11-04 VITALS
DIASTOLIC BLOOD PRESSURE: 114 MMHG | SYSTOLIC BLOOD PRESSURE: 161 MMHG | OXYGEN SATURATION: 95 % | WEIGHT: 263.5 LBS | HEIGHT: 72 IN | TEMPERATURE: 98.3 F | HEART RATE: 80 BPM | RESPIRATION RATE: 11 BRPM | BODY MASS INDEX: 35.69 KG/M2

## 2021-11-04 DIAGNOSIS — R07.9 CHEST PAIN, UNSPECIFIED TYPE: Primary | ICD-10-CM

## 2021-11-04 LAB
ABSOLUTE EOS #: 0.11 K/UL (ref 0–0.44)
ABSOLUTE IMMATURE GRANULOCYTE: 0.03 K/UL (ref 0–0.3)
ABSOLUTE LYMPH #: 2.94 K/UL (ref 1.1–3.7)
ABSOLUTE MONO #: 0.62 K/UL (ref 0.1–1.2)
ALBUMIN SERPL-MCNC: 4.2 G/DL (ref 3.5–5.2)
ALBUMIN/GLOBULIN RATIO: NORMAL (ref 1–2.5)
ALP BLD-CCNC: 108 U/L (ref 40–129)
ALT SERPL-CCNC: 20 U/L (ref 5–41)
ANION GAP SERPL CALCULATED.3IONS-SCNC: 12 MMOL/L (ref 9–17)
AST SERPL-CCNC: 20 U/L
BASOPHILS # BLD: 0 % (ref 0–2)
BASOPHILS ABSOLUTE: 0.03 K/UL (ref 0–0.2)
BILIRUB SERPL-MCNC: 0.36 MG/DL (ref 0.3–1.2)
BILIRUBIN DIRECT: 0.09 MG/DL
BILIRUBIN, INDIRECT: 0.27 MG/DL (ref 0–1)
BNP INTERPRETATION: ABNORMAL
BUN BLDV-MCNC: 6 MG/DL (ref 6–20)
BUN/CREAT BLD: 8 (ref 9–20)
CALCIUM SERPL-MCNC: 8.7 MG/DL (ref 8.6–10.4)
CHLORIDE BLD-SCNC: 105 MMOL/L (ref 98–107)
CO2: 23 MMOL/L (ref 20–31)
CREAT SERPL-MCNC: 0.72 MG/DL (ref 0.7–1.2)
DIFFERENTIAL TYPE: ABNORMAL
EKG ATRIAL RATE: 84 BPM
EKG P AXIS: 12 DEGREES
EKG P-R INTERVAL: 170 MS
EKG Q-T INTERVAL: 414 MS
EKG QRS DURATION: 106 MS
EKG QTC CALCULATION (BAZETT): 489 MS
EKG R AXIS: -37 DEGREES
EKG T AXIS: 6 DEGREES
EKG VENTRICULAR RATE: 84 BPM
EOSINOPHILS RELATIVE PERCENT: 1 % (ref 1–4)
GFR AFRICAN AMERICAN: >60 ML/MIN
GFR NON-AFRICAN AMERICAN: >60 ML/MIN
GFR SERPL CREATININE-BSD FRML MDRD: ABNORMAL ML/MIN/{1.73_M2}
GFR SERPL CREATININE-BSD FRML MDRD: ABNORMAL ML/MIN/{1.73_M2}
GLOBULIN: NORMAL G/DL (ref 1.5–3.8)
GLUCOSE BLD-MCNC: 96 MG/DL (ref 70–99)
HCT VFR BLD CALC: 46.7 % (ref 40.7–50.3)
HEMOGLOBIN: 14.8 G/DL (ref 13–17)
IMMATURE GRANULOCYTES: 0 %
LYMPHOCYTES # BLD: 36 % (ref 24–43)
MCH RBC QN AUTO: 26.9 PG (ref 25.2–33.5)
MCHC RBC AUTO-ENTMCNC: 31.7 G/DL (ref 28.4–34.8)
MCV RBC AUTO: 84.8 FL (ref 82.6–102.9)
MONOCYTES # BLD: 8 % (ref 3–12)
MYOGLOBIN: <21 NG/ML (ref 28–72)
NRBC AUTOMATED: 0 PER 100 WBC
PDW BLD-RTO: 16 % (ref 11.8–14.4)
PLATELET # BLD: 274 K/UL (ref 138–453)
PLATELET ESTIMATE: ABNORMAL
PMV BLD AUTO: 9.1 FL (ref 8.1–13.5)
POTASSIUM SERPL-SCNC: 3.8 MMOL/L (ref 3.7–5.3)
PRO-BNP: 470 PG/ML
RBC # BLD: 5.51 M/UL (ref 4.21–5.77)
RBC # BLD: ABNORMAL 10*6/UL
SEG NEUTROPHILS: 55 % (ref 36–65)
SEGMENTED NEUTROPHILS ABSOLUTE COUNT: 4.35 K/UL (ref 1.5–8.1)
SODIUM BLD-SCNC: 140 MMOL/L (ref 135–144)
THYROXINE, FREE: 1.14 NG/DL (ref 0.93–1.7)
TOTAL PROTEIN: 6.8 G/DL (ref 6.4–8.3)
TROPONIN INTERP: ABNORMAL
TROPONIN INTERP: NORMAL
TROPONIN T: ABNORMAL NG/ML
TROPONIN T: NORMAL NG/ML
TROPONIN, HIGH SENSITIVITY: 8 NG/L (ref 0–22)
TROPONIN, HIGH SENSITIVITY: 8 NG/L (ref 0–22)
TSH SERPL DL<=0.05 MIU/L-ACNC: 0.66 MIU/L (ref 0.3–5)
WBC # BLD: 8.1 K/UL (ref 3.5–11.3)
WBC # BLD: ABNORMAL 10*3/UL

## 2021-11-04 PROCEDURE — 6360000002 HC RX W HCPCS: Performed by: PHYSICIAN ASSISTANT

## 2021-11-04 PROCEDURE — 80076 HEPATIC FUNCTION PANEL: CPT

## 2021-11-04 PROCEDURE — 83874 ASSAY OF MYOGLOBIN: CPT

## 2021-11-04 PROCEDURE — 85025 COMPLETE CBC W/AUTO DIFF WBC: CPT

## 2021-11-04 PROCEDURE — 84443 ASSAY THYROID STIM HORMONE: CPT

## 2021-11-04 PROCEDURE — 99284 EMERGENCY DEPT VISIT MOD MDM: CPT

## 2021-11-04 PROCEDURE — 96374 THER/PROPH/DIAG INJ IV PUSH: CPT

## 2021-11-04 PROCEDURE — 83880 ASSAY OF NATRIURETIC PEPTIDE: CPT

## 2021-11-04 PROCEDURE — 6370000000 HC RX 637 (ALT 250 FOR IP): Performed by: PHYSICIAN ASSISTANT

## 2021-11-04 PROCEDURE — 84484 ASSAY OF TROPONIN QUANT: CPT

## 2021-11-04 PROCEDURE — 2500000003 HC RX 250 WO HCPCS: Performed by: PHYSICIAN ASSISTANT

## 2021-11-04 PROCEDURE — 84439 ASSAY OF FREE THYROXINE: CPT

## 2021-11-04 PROCEDURE — 96375 TX/PRO/DX INJ NEW DRUG ADDON: CPT

## 2021-11-04 PROCEDURE — 80048 BASIC METABOLIC PNL TOTAL CA: CPT

## 2021-11-04 PROCEDURE — 93005 ELECTROCARDIOGRAM TRACING: CPT | Performed by: EMERGENCY MEDICINE

## 2021-11-04 PROCEDURE — 71045 X-RAY EXAM CHEST 1 VIEW: CPT

## 2021-11-04 RX ORDER — FUROSEMIDE 10 MG/ML
20 INJECTION INTRAMUSCULAR; INTRAVENOUS ONCE
Status: COMPLETED | OUTPATIENT
Start: 2021-11-04 | End: 2021-11-04

## 2021-11-04 RX ORDER — HYDROCHLOROTHIAZIDE 25 MG/1
25 TABLET ORAL DAILY
Status: DISCONTINUED | OUTPATIENT
Start: 2021-11-04 | End: 2021-11-04 | Stop reason: HOSPADM

## 2021-11-04 RX ORDER — METOPROLOL TARTRATE 5 MG/5ML
2.5 INJECTION INTRAVENOUS ONCE
Status: COMPLETED | OUTPATIENT
Start: 2021-11-04 | End: 2021-11-04

## 2021-11-04 RX ADMIN — HYDROCHLOROTHIAZIDE 25 MG: 25 TABLET ORAL at 17:09

## 2021-11-04 RX ADMIN — METOPROLOL TARTRATE 2.5 MG: 5 INJECTION INTRAVENOUS at 15:36

## 2021-11-04 RX ADMIN — FUROSEMIDE 20 MG: 10 INJECTION, SOLUTION INTRAVENOUS at 17:09

## 2021-11-04 ASSESSMENT — ENCOUNTER SYMPTOMS
BLOOD IN STOOL: 0
SHORTNESS OF BREATH: 0
ABDOMINAL DISTENTION: 0
CHEST TIGHTNESS: 0
WHEEZING: 0
ABDOMINAL PAIN: 0
CONSTIPATION: 0
BACK PAIN: 0
DIARRHEA: 0
NAUSEA: 0
COUGH: 0
VOMITING: 0

## 2021-11-04 ASSESSMENT — PAIN SCALES - GENERAL: PAINLEVEL_OUTOF10: 4

## 2021-11-04 NOTE — ED PROVIDER NOTES
Initial orders were placed on this patient in triage based on the chief complaint provided, per the established ED protocols. The patient will have a formal evaluation by another care provider upon arrival to the treatment room. He reported chest pain and palpitations upon waking from a nap today. He has a history of CHF.        Sherie Hodgkin, APRN - CNP Sherie Hodgkin, APRN - CNP  11/04/21 1527

## 2021-11-04 NOTE — ED PROVIDER NOTES
PHYSICAL EXAM    (up to 7 for level 4, 8 or more for level 5)     ED Triage Vitals   BP Temp Temp src Pulse Resp SpO2 Height Weight   11/04/21 1308 11/04/21 1306 -- 11/04/21 1306 11/04/21 1306 11/04/21 1306 11/04/21 1306 11/04/21 1306   (!) 176/118 98.3 °F (36.8 °C)  94 18 96 % 6' (1.829 m) 263 lb 8 oz (119.5 kg)       Physical Exam  Vitals and nursing note reviewed. Constitutional:       General: He is not in acute distress. Appearance: Normal appearance. He is well-developed. He is not ill-appearing, toxic-appearing or diaphoretic. HENT:      Head: Normocephalic and atraumatic. Eyes:      General: No scleral icterus. Right eye: No discharge. Left eye: No discharge. Conjunctiva/sclera: Conjunctivae normal.   Neck:      Thyroid: No thyroid mass, thyromegaly or thyroid tenderness. Cardiovascular:      Rate and Rhythm: Normal rate and regular rhythm. Heart sounds: Normal heart sounds. No murmur heard. No friction rub. No gallop. Pulmonary:      Effort: Pulmonary effort is normal. No respiratory distress. Breath sounds: Normal breath sounds. No stridor. No wheezing, rhonchi or rales. Chest:      Chest wall: No tenderness. Abdominal:      General: Bowel sounds are normal.      Palpations: Abdomen is soft. Tenderness: There is no abdominal tenderness. Musculoskeletal:         General: No swelling, tenderness, deformity or signs of injury. Normal range of motion. Cervical back: Normal range of motion and neck supple. Right lower leg: No edema. Left lower leg: No edema. Skin:     General: Skin is warm and dry. Neurological:      General: No focal deficit present. Mental Status: He is alert and oriented to person, place, and time.    Psychiatric:         Attention and Perception: Attention and perception normal.         Mood and Affect: Mood and affect normal.         Speech: Speech normal.         Behavior: Behavior normal. Behavior is cooperative. Thought Content:  Thought content normal.         Cognition and Memory: Cognition and memory normal.         Judgment: Judgment normal.             DIAGNOSTIC RESULTS     EKG: All EKG's are interpreted by the Emergency Department Physician who either signs or Co-signs this chart in the absence of a cardiologist.    Normal sinus rhythm without ST segment elevation or T wave inversion    RADIOLOGY:   Non-plain film images such as CT, Ultrasound and MRI are read by the radiologist. Plain radiographic images are visualized and preliminarily interpreted by the emergency physician with the below findings:    No acute cardiopulmonary disease.  Cardiomegaly with venous hypertension but   no radiographic CHF.  Similar bibasilar atelectasis or scarring         Interpretation per the Radiologist below, if available at the time of this note:        ED BEDSIDE ULTRASOUND:   Performed by ED Physician - none    LABS:  Results for orders placed or performed during the hospital encounter of 74/45/16   Basic Metabolic Prof   Result Value Ref Range    Glucose 96 70 - 99 mg/dL    BUN 6 6 - 20 mg/dL    CREATININE 0.72 0.70 - 1.20 mg/dL    Bun/Cre Ratio 8 (L) 9 - 20    Calcium 8.7 8.6 - 10.4 mg/dL    Sodium 140 135 - 144 mmol/L    Potassium 3.8 3.7 - 5.3 mmol/L    Chloride 105 98 - 107 mmol/L    CO2 23 20 - 31 mmol/L    Anion Gap 12 9 - 17 mmol/L    GFR Non-African American >60 >60 mL/min    GFR African American >60 >60 mL/min    GFR Comment          GFR Staging NOT REPORTED    CBC with DIFF   Result Value Ref Range    WBC 8.1 3.5 - 11.3 k/uL    RBC 5.51 4.21 - 5.77 m/uL    Hemoglobin 14.8 13.0 - 17.0 g/dL    Hematocrit 46.7 40.7 - 50.3 %    MCV 84.8 82.6 - 102.9 fL    MCH 26.9 25.2 - 33.5 pg    MCHC 31.7 28.4 - 34.8 g/dL    RDW 16.0 (H) 11.8 - 14.4 %    Platelets 202 866 - 475 k/uL    MPV 9.1 8.1 - 13.5 fL    NRBC Automated 0.0 0.0 per 100 WBC    Differential Type NOT REPORTED     Seg Neutrophils 55 36 - 65 % Vitals:    11/04/21 1306 11/04/21 1308 11/04/21 1533   BP:  (!) 176/118 (!) 161/114   Pulse: 94  80   Resp: 18  11   Temp: 98.3 °F (36.8 °C)     SpO2: 96%  95%   Weight: 263 lb 8 oz (119.5 kg)     Height: 6' (1.829 m)             Medical Decision Making patient is a 73-year-old male with history of CHF and cardiomegaly. He has been taking his medications as prescribed but has not followed up with primary care doctor or cardiologist.  Patient has not had any chest pain or in the emergency room. Blood work is within normal limits. Chest x-ray shows cardiomegaly without any other acute findings. Patient is discharged home in stable condition to follow-up with cardiology. Return to the emergency room if symptoms worsen    CONSULTS:  None    PROCEDURES:  None    FINAL IMPRESSION      1.  Chest pain, unspecified type          DISPOSITION/PLAN   DISPOSITION Decision To Discharge 11/04/2021 06:12:16 PM      PATIENT REFERRED TO:   Dennis Beebe MD  [address] [place], [place] [address] [phone]    Schedule an appointment as soon as possible for a visit in 2 days  return, If symptoms worsen    Byron Savage MD  . [facility] [address]:     New Prescriptions    No medications on file         (Please note that portions of this note were completed with a voice recognition program.  Efforts were made to edit the dictations but occasionally words are mis-transcribed.)    Deyvi FU PA-C  Attending Emergency Physician         Tara Burgess PA-C  11/05/21 8266

## 2022-01-31 ENCOUNTER — HOSPITAL ENCOUNTER (INPATIENT)
Age: 42
LOS: 3 days | Discharge: HOME | DRG: 280 | End: 2022-02-03
Attending: EMERGENCY MEDICINE | Admitting: INTERNAL MEDICINE
Payer: COMMERCIAL

## 2022-01-31 ENCOUNTER — APPOINTMENT (OUTPATIENT)
Dept: GENERAL RADIOLOGY | Age: 42
DRG: 280 | End: 2022-01-31
Payer: COMMERCIAL

## 2022-01-31 DIAGNOSIS — R07.9 CHEST PAIN, UNSPECIFIED TYPE: Primary | ICD-10-CM

## 2022-01-31 DIAGNOSIS — I42.8 NON-ISCHEMIC CARDIOMYOPATHY (HCC): ICD-10-CM

## 2022-01-31 DIAGNOSIS — I50.23 ACUTE ON CHRONIC SYSTOLIC CONGESTIVE HEART FAILURE (HCC): ICD-10-CM

## 2022-01-31 DIAGNOSIS — I16.0 HYPERTENSIVE URGENCY: ICD-10-CM

## 2022-01-31 DIAGNOSIS — N17.9 AKI (ACUTE KIDNEY INJURY) (HCC): ICD-10-CM

## 2022-01-31 DIAGNOSIS — I21.4 NSTEMI (NON-ST ELEVATED MYOCARDIAL INFARCTION) (HCC): ICD-10-CM

## 2022-01-31 PROBLEM — F10.10 ALCOHOL ABUSE: Status: ACTIVE | Noted: 2022-01-31

## 2022-01-31 PROBLEM — R10.13 EPIGASTRIC PAIN: Status: ACTIVE | Noted: 2022-01-31

## 2022-01-31 LAB
ABSOLUTE EOS #: 0.1 K/UL (ref 0–0.44)
ABSOLUTE IMMATURE GRANULOCYTE: 0.03 K/UL (ref 0–0.3)
ABSOLUTE LYMPH #: 2.01 K/UL (ref 1.1–3.7)
ABSOLUTE MONO #: 0.65 K/UL (ref 0.1–1.2)
ALBUMIN SERPL-MCNC: 4.1 G/DL (ref 3.5–5.2)
ALBUMIN/GLOBULIN RATIO: 1.3 (ref 1–2.5)
ALP BLD-CCNC: 109 U/L (ref 40–129)
ALT SERPL-CCNC: 22 U/L (ref 5–41)
ANION GAP SERPL CALCULATED.3IONS-SCNC: 14 MMOL/L (ref 9–17)
AST SERPL-CCNC: 18 U/L
BASOPHILS # BLD: 0 % (ref 0–2)
BASOPHILS ABSOLUTE: <0.03 K/UL (ref 0–0.2)
BILIRUB SERPL-MCNC: 0.39 MG/DL (ref 0.3–1.2)
BNP INTERPRETATION: ABNORMAL
BUN BLDV-MCNC: 6 MG/DL (ref 6–20)
BUN/CREAT BLD: ABNORMAL (ref 9–20)
CALCIUM SERPL-MCNC: 8.8 MG/DL (ref 8.6–10.4)
CHLORIDE BLD-SCNC: 102 MMOL/L (ref 98–107)
CO2: 19 MMOL/L (ref 20–31)
CREAT SERPL-MCNC: 0.71 MG/DL (ref 0.7–1.2)
DIFFERENTIAL TYPE: ABNORMAL
EOSINOPHILS RELATIVE PERCENT: 1 % (ref 1–4)
GFR AFRICAN AMERICAN: >60 ML/MIN
GFR NON-AFRICAN AMERICAN: >60 ML/MIN
GFR SERPL CREATININE-BSD FRML MDRD: ABNORMAL ML/MIN/{1.73_M2}
GFR SERPL CREATININE-BSD FRML MDRD: ABNORMAL ML/MIN/{1.73_M2}
GLUCOSE BLD-MCNC: 123 MG/DL (ref 70–99)
HCT VFR BLD CALC: 49.4 % (ref 40.7–50.3)
HEMOGLOBIN: 16 G/DL (ref 13–17)
IMMATURE GRANULOCYTES: 0 %
LIPASE: 13 U/L (ref 13–60)
LIPASE: 14 U/L (ref 13–60)
LYMPHOCYTES # BLD: 23 % (ref 24–43)
MCH RBC QN AUTO: 26.9 PG (ref 25.2–33.5)
MCHC RBC AUTO-ENTMCNC: 32.4 G/DL (ref 28.4–34.8)
MCV RBC AUTO: 83.2 FL (ref 82.6–102.9)
MONOCYTES # BLD: 8 % (ref 3–12)
NRBC AUTOMATED: 0 PER 100 WBC
PARTIAL THROMBOPLASTIN TIME: 27.2 SEC (ref 20.5–30.5)
PDW BLD-RTO: 14.6 % (ref 11.8–14.4)
PLATELET # BLD: 225 K/UL (ref 138–453)
PLATELET ESTIMATE: ABNORMAL
PMV BLD AUTO: 11.5 FL (ref 8.1–13.5)
POTASSIUM SERPL-SCNC: 4 MMOL/L (ref 3.7–5.3)
PRO-BNP: 1068 PG/ML
RBC # BLD: 5.94 M/UL (ref 4.21–5.77)
RBC # BLD: ABNORMAL 10*6/UL
SARS-COV-2, RAPID: NOT DETECTED
SEG NEUTROPHILS: 68 % (ref 36–65)
SEGMENTED NEUTROPHILS ABSOLUTE COUNT: 5.87 K/UL (ref 1.5–8.1)
SODIUM BLD-SCNC: 135 MMOL/L (ref 135–144)
SPECIMEN DESCRIPTION: NORMAL
TOTAL PROTEIN: 7.2 G/DL (ref 6.4–8.3)
TROPONIN INTERP: ABNORMAL
TROPONIN INTERP: ABNORMAL
TROPONIN T: ABNORMAL NG/ML
TROPONIN T: ABNORMAL NG/ML
TROPONIN, HIGH SENSITIVITY: 24 NG/L (ref 0–22)
TROPONIN, HIGH SENSITIVITY: 37 NG/L (ref 0–22)
WBC # BLD: 8.7 K/UL (ref 3.5–11.3)
WBC # BLD: ABNORMAL 10*3/UL

## 2022-01-31 PROCEDURE — 6360000002 HC RX W HCPCS: Performed by: STUDENT IN AN ORGANIZED HEALTH CARE EDUCATION/TRAINING PROGRAM

## 2022-01-31 PROCEDURE — 36415 COLL VENOUS BLD VENIPUNCTURE: CPT

## 2022-01-31 PROCEDURE — A4216 STERILE WATER/SALINE, 10 ML: HCPCS

## 2022-01-31 PROCEDURE — 6370000000 HC RX 637 (ALT 250 FOR IP): Performed by: STUDENT IN AN ORGANIZED HEALTH CARE EDUCATION/TRAINING PROGRAM

## 2022-01-31 PROCEDURE — 85730 THROMBOPLASTIN TIME PARTIAL: CPT

## 2022-01-31 PROCEDURE — 84484 ASSAY OF TROPONIN QUANT: CPT

## 2022-01-31 PROCEDURE — 96374 THER/PROPH/DIAG INJ IV PUSH: CPT

## 2022-01-31 PROCEDURE — 2500000003 HC RX 250 WO HCPCS: Performed by: STUDENT IN AN ORGANIZED HEALTH CARE EDUCATION/TRAINING PROGRAM

## 2022-01-31 PROCEDURE — 83880 ASSAY OF NATRIURETIC PEPTIDE: CPT

## 2022-01-31 PROCEDURE — 6360000002 HC RX W HCPCS

## 2022-01-31 PROCEDURE — 6370000000 HC RX 637 (ALT 250 FOR IP)

## 2022-01-31 PROCEDURE — 83690 ASSAY OF LIPASE: CPT

## 2022-01-31 PROCEDURE — 87635 SARS-COV-2 COVID-19 AMP PRB: CPT

## 2022-01-31 PROCEDURE — 2060000000 HC ICU INTERMEDIATE R&B

## 2022-01-31 PROCEDURE — 2580000003 HC RX 258: Performed by: STUDENT IN AN ORGANIZED HEALTH CARE EDUCATION/TRAINING PROGRAM

## 2022-01-31 PROCEDURE — 99283 EMERGENCY DEPT VISIT LOW MDM: CPT

## 2022-01-31 PROCEDURE — 93005 ELECTROCARDIOGRAM TRACING: CPT

## 2022-01-31 PROCEDURE — C9113 INJ PANTOPRAZOLE SODIUM, VIA: HCPCS

## 2022-01-31 PROCEDURE — 85025 COMPLETE CBC W/AUTO DIFF WBC: CPT

## 2022-01-31 PROCEDURE — 2580000003 HC RX 258

## 2022-01-31 PROCEDURE — 80053 COMPREHEN METABOLIC PANEL: CPT

## 2022-01-31 PROCEDURE — 71045 X-RAY EXAM CHEST 1 VIEW: CPT

## 2022-01-31 RX ORDER — SODIUM CHLORIDE 9 MG/ML
10 INJECTION INTRAVENOUS DAILY
Status: DISCONTINUED | OUTPATIENT
Start: 2022-01-31 | End: 2022-02-03

## 2022-01-31 RX ORDER — FOLIC ACID 1 MG/1
1 TABLET ORAL DAILY
Status: DISCONTINUED | OUTPATIENT
Start: 2022-01-31 | End: 2022-02-03 | Stop reason: HOSPADM

## 2022-01-31 RX ORDER — ATORVASTATIN CALCIUM 80 MG/1
80 TABLET, FILM COATED ORAL ONCE
Status: COMPLETED | OUTPATIENT
Start: 2022-01-31 | End: 2022-01-31

## 2022-01-31 RX ORDER — HEPARIN SODIUM 1000 [USP'U]/ML
2000 INJECTION, SOLUTION INTRAVENOUS; SUBCUTANEOUS PRN
Status: DISCONTINUED | OUTPATIENT
Start: 2022-01-31 | End: 2022-02-01

## 2022-01-31 RX ORDER — SODIUM CHLORIDE 0.9 % (FLUSH) 0.9 %
5-40 SYRINGE (ML) INJECTION EVERY 12 HOURS SCHEDULED
Status: DISCONTINUED | OUTPATIENT
Start: 2022-01-31 | End: 2022-02-03 | Stop reason: HOSPADM

## 2022-01-31 RX ORDER — POTASSIUM CHLORIDE 7.45 MG/ML
10 INJECTION INTRAVENOUS PRN
Status: DISCONTINUED | OUTPATIENT
Start: 2022-01-31 | End: 2022-02-03 | Stop reason: HOSPADM

## 2022-01-31 RX ORDER — BISMUTH SUBSALICYLATE 262 MG/1
524 TABLET, CHEWABLE ORAL
Status: DISCONTINUED | OUTPATIENT
Start: 2022-01-31 | End: 2022-02-03 | Stop reason: HOSPADM

## 2022-01-31 RX ORDER — ASPIRIN 81 MG/1
324 TABLET, CHEWABLE ORAL ONCE
Status: DISCONTINUED | OUTPATIENT
Start: 2022-01-31 | End: 2022-02-03 | Stop reason: HOSPADM

## 2022-01-31 RX ORDER — HEPARIN SODIUM 10000 [USP'U]/100ML
5-30 INJECTION, SOLUTION INTRAVENOUS CONTINUOUS
Status: DISCONTINUED | OUTPATIENT
Start: 2022-01-31 | End: 2022-02-01

## 2022-01-31 RX ORDER — CARVEDILOL 12.5 MG/1
12.5 TABLET ORAL 2 TIMES DAILY WITH MEALS
Status: DISCONTINUED | OUTPATIENT
Start: 2022-01-31 | End: 2022-02-03 | Stop reason: HOSPADM

## 2022-01-31 RX ORDER — CARVEDILOL 12.5 MG/1
12.5 TABLET ORAL 2 TIMES DAILY WITH MEALS
Status: DISCONTINUED | OUTPATIENT
Start: 2022-01-31 | End: 2022-01-31

## 2022-01-31 RX ORDER — ONDANSETRON 4 MG/1
4 TABLET, ORALLY DISINTEGRATING ORAL EVERY 8 HOURS PRN
Status: DISCONTINUED | OUTPATIENT
Start: 2022-01-31 | End: 2022-02-03 | Stop reason: HOSPADM

## 2022-01-31 RX ORDER — POTASSIUM CHLORIDE 20 MEQ/1
40 TABLET, EXTENDED RELEASE ORAL PRN
Status: DISCONTINUED | OUTPATIENT
Start: 2022-01-31 | End: 2022-02-03 | Stop reason: HOSPADM

## 2022-01-31 RX ORDER — ACETAMINOPHEN 325 MG/1
650 TABLET ORAL EVERY 6 HOURS PRN
Status: DISCONTINUED | OUTPATIENT
Start: 2022-01-31 | End: 2022-02-03 | Stop reason: HOSPADM

## 2022-01-31 RX ORDER — M-VIT,TX,IRON,MINS/CALC/FOLIC 27MG-0.4MG
1 TABLET ORAL DAILY
Status: DISCONTINUED | OUTPATIENT
Start: 2022-01-31 | End: 2022-02-03 | Stop reason: HOSPADM

## 2022-01-31 RX ORDER — HEPARIN SODIUM 1000 [USP'U]/ML
4000 INJECTION, SOLUTION INTRAVENOUS; SUBCUTANEOUS ONCE
Status: COMPLETED | OUTPATIENT
Start: 2022-01-31 | End: 2022-01-31

## 2022-01-31 RX ORDER — FENTANYL CITRATE 50 UG/ML
25 INJECTION, SOLUTION INTRAMUSCULAR; INTRAVENOUS
Status: DISCONTINUED | OUTPATIENT
Start: 2022-01-31 | End: 2022-02-03 | Stop reason: HOSPADM

## 2022-01-31 RX ORDER — LANOLIN ALCOHOL/MO/W.PET/CERES
100 CREAM (GRAM) TOPICAL DAILY
Status: DISCONTINUED | OUTPATIENT
Start: 2022-01-31 | End: 2022-02-03 | Stop reason: HOSPADM

## 2022-01-31 RX ORDER — SODIUM CHLORIDE 0.9 % (FLUSH) 0.9 %
5-40 SYRINGE (ML) INJECTION PRN
Status: DISCONTINUED | OUTPATIENT
Start: 2022-01-31 | End: 2022-02-03 | Stop reason: HOSPADM

## 2022-01-31 RX ORDER — NITROGLYCERIN 20 MG/100ML
5-200 INJECTION INTRAVENOUS CONTINUOUS
Status: DISCONTINUED | OUTPATIENT
Start: 2022-01-31 | End: 2022-02-01

## 2022-01-31 RX ORDER — HEPARIN SODIUM 1000 [USP'U]/ML
4000 INJECTION, SOLUTION INTRAVENOUS; SUBCUTANEOUS PRN
Status: DISCONTINUED | OUTPATIENT
Start: 2022-01-31 | End: 2022-02-01

## 2022-01-31 RX ORDER — POLYETHYLENE GLYCOL 3350 17 G/17G
17 POWDER, FOR SOLUTION ORAL DAILY PRN
Status: DISCONTINUED | OUTPATIENT
Start: 2022-01-31 | End: 2022-02-03 | Stop reason: HOSPADM

## 2022-01-31 RX ORDER — ENALAPRIL MALEATE 10 MG/1
10 TABLET ORAL DAILY
Status: DISCONTINUED | OUTPATIENT
Start: 2022-01-31 | End: 2022-02-03 | Stop reason: HOSPADM

## 2022-01-31 RX ORDER — ONDANSETRON 2 MG/ML
4 INJECTION INTRAMUSCULAR; INTRAVENOUS EVERY 6 HOURS PRN
Status: DISCONTINUED | OUTPATIENT
Start: 2022-01-31 | End: 2022-02-03 | Stop reason: HOSPADM

## 2022-01-31 RX ORDER — FUROSEMIDE 10 MG/ML
80 INJECTION INTRAMUSCULAR; INTRAVENOUS ONCE
Status: COMPLETED | OUTPATIENT
Start: 2022-01-31 | End: 2022-01-31

## 2022-01-31 RX ORDER — HYDROCHLOROTHIAZIDE 25 MG/1
25 TABLET ORAL DAILY
Status: DISCONTINUED | OUTPATIENT
Start: 2022-01-31 | End: 2022-02-01

## 2022-01-31 RX ORDER — ACETAMINOPHEN 650 MG/1
650 SUPPOSITORY RECTAL EVERY 6 HOURS PRN
Status: DISCONTINUED | OUTPATIENT
Start: 2022-01-31 | End: 2022-02-03 | Stop reason: HOSPADM

## 2022-01-31 RX ORDER — ESMOLOL HYDROCHLORIDE 10 MG/ML
50-300 INJECTION, SOLUTION INTRAVENOUS CONTINUOUS
Status: DISCONTINUED | OUTPATIENT
Start: 2022-01-31 | End: 2022-02-01

## 2022-01-31 RX ORDER — PANTOPRAZOLE SODIUM 40 MG/10ML
40 INJECTION, POWDER, LYOPHILIZED, FOR SOLUTION INTRAVENOUS DAILY
Status: DISCONTINUED | OUTPATIENT
Start: 2022-01-31 | End: 2022-02-03

## 2022-01-31 RX ORDER — SODIUM CHLORIDE 9 MG/ML
25 INJECTION, SOLUTION INTRAVENOUS PRN
Status: DISCONTINUED | OUTPATIENT
Start: 2022-01-31 | End: 2022-02-03 | Stop reason: HOSPADM

## 2022-01-31 RX ADMIN — HEPARIN SODIUM 8.5 UNITS/KG/HR: 5000 INJECTION INTRAVENOUS; SUBCUTANEOUS at 22:32

## 2022-01-31 RX ADMIN — NITROGLYCERIN 40 MCG/MIN: 20 INJECTION INTRAVENOUS at 16:18

## 2022-01-31 RX ADMIN — ONDANSETRON 4 MG: 2 INJECTION INTRAMUSCULAR; INTRAVENOUS at 22:35

## 2022-01-31 RX ADMIN — FENTANYL CITRATE 25 MCG: 50 INJECTION, SOLUTION INTRAMUSCULAR; INTRAVENOUS at 20:44

## 2022-01-31 RX ADMIN — FOLIC ACID 1 MG: 1 TABLET ORAL at 20:49

## 2022-01-31 RX ADMIN — BISMUTH SUBSALICYLATE 524 MG: 262 TABLET, CHEWABLE ORAL at 20:48

## 2022-01-31 RX ADMIN — SODIUM CHLORIDE, PRESERVATIVE FREE 10 ML: 5 INJECTION INTRAVENOUS at 18:58

## 2022-01-31 RX ADMIN — CARVEDILOL 12.5 MG: 12.5 TABLET, FILM COATED ORAL at 12:54

## 2022-01-31 RX ADMIN — HYDROCHLOROTHIAZIDE 25 MG: 25 TABLET ORAL at 12:53

## 2022-01-31 RX ADMIN — SODIUM CHLORIDE 25 ML: 9 INJECTION, SOLUTION INTRAVENOUS at 22:33

## 2022-01-31 RX ADMIN — FUROSEMIDE 80 MG: 10 INJECTION, SOLUTION INTRAVENOUS at 12:54

## 2022-01-31 RX ADMIN — SODIUM CHLORIDE, PRESERVATIVE FREE 20 ML: 5 INJECTION INTRAVENOUS at 22:47

## 2022-01-31 RX ADMIN — ENALAPRIL MALEATE 10 MG: 10 TABLET ORAL at 15:29

## 2022-01-31 RX ADMIN — HEPARIN SODIUM 4000 UNITS: 1000 INJECTION INTRAVENOUS; SUBCUTANEOUS at 22:31

## 2022-01-31 RX ADMIN — ATORVASTATIN CALCIUM 80 MG: 80 TABLET, FILM COATED ORAL at 22:37

## 2022-01-31 RX ADMIN — Medication 1 TABLET: at 20:49

## 2022-01-31 RX ADMIN — Medication 100 MG: at 20:48

## 2022-01-31 RX ADMIN — THIAMINE HYDROCHLORIDE 200 MG: 100 INJECTION, SOLUTION INTRAMUSCULAR; INTRAVENOUS at 15:30

## 2022-01-31 RX ADMIN — PANTOPRAZOLE SODIUM 40 MG: 40 INJECTION, POWDER, LYOPHILIZED, FOR SOLUTION INTRAVENOUS at 18:58

## 2022-01-31 RX ADMIN — SODIUM CHLORIDE, PRESERVATIVE FREE 20 ML: 5 INJECTION INTRAVENOUS at 20:45

## 2022-01-31 ASSESSMENT — PAIN DESCRIPTION - FREQUENCY: FREQUENCY: INTERMITTENT

## 2022-01-31 ASSESSMENT — ENCOUNTER SYMPTOMS
SHORTNESS OF BREATH: 0
SINUS PAIN: 0
SHORTNESS OF BREATH: 1
SORE THROAT: 0
BACK PAIN: 0
RHINORRHEA: 0
CONSTIPATION: 0
COUGH: 0
WHEEZING: 0
DIARRHEA: 0
CHEST TIGHTNESS: 0
ABDOMINAL PAIN: 1
COLOR CHANGE: 0
NAUSEA: 0
ALLERGIC/IMMUNOLOGIC NEGATIVE: 1
EYE REDNESS: 0
PHOTOPHOBIA: 0
FACIAL SWELLING: 0
VOMITING: 0
ABDOMINAL PAIN: 0

## 2022-01-31 ASSESSMENT — PAIN DESCRIPTION - PAIN TYPE
TYPE: ACUTE PAIN
TYPE: ACUTE PAIN

## 2022-01-31 ASSESSMENT — PAIN SCALES - GENERAL
PAINLEVEL_OUTOF10: 9
PAINLEVEL_OUTOF10: 8
PAINLEVEL_OUTOF10: 8
PAINLEVEL_OUTOF10: 7

## 2022-01-31 ASSESSMENT — PAIN DESCRIPTION - LOCATION
LOCATION: ABDOMEN
LOCATION: ABDOMEN;CHEST

## 2022-01-31 ASSESSMENT — PAIN DESCRIPTION - ORIENTATION
ORIENTATION: MID
ORIENTATION: LEFT

## 2022-01-31 NOTE — H&P
[address]     Department of Internal Medicine - Staff Internal Medicine Teaching Service          ADMISSION NOTE/HISTORY AND PHYSICAL EXAMINATION   Date: 1/31/2022  Patient Name: Ignacio Ignacio  Date of admission: 1/31/2022 12:28 PM  YOB: 1980  PCP: Alexandra Hays MD  History Obtained From: chart review and patient    CHIEF COMPLAINT     Chief complaint: Chest pain, abdominal pain and shortness of breath. HISTORY OF PRESENTING ILLNESS     The patient is a 37 y.o. male who presented to the ED complaining of chest pain on the left side, epigastric abdominal pain and shortness of breath. Patient has a past medical history of an ICM with EF of 10 to 15%. Patient stated that he was lying comfortably on the bed when all of this started which woke him up. He describes the chest pain to be located in the center and left side, 6 out of 10, stabbing in nature and worse when taking a deep breath. His abdominal pain is located in the epigastric region, he described it as colicky, associated with nausea but no vomiting or diarrhea. EMS was called, patient did receive sublingual nitro and spray 2 times on his way to the ED here. As per patient he was admitted to [facility] in September for similar complaint. Patient admits to not taking his antihypertensive medications this morning. On arrival to the ED, patient was on nasal cannula, was removed and was saturating well on room air. BP was high 200s/130, heart rate 103, otherwise other vital signs stable. Patient was started on nitro drip for hypertensive emergency. Pertinent labs were ordered which showed elevated proBNP, uptrending troponins 24> 37. EKG showed normal sinus rhythm with left axis deviation. Patient was started on low-dose heparin. Chest x-ray showed no acute abnormality. Covid negative.     Recent hospitalization:  -11/2021 in [facility]-for unspecified type chest pain, was discharged to follow-up with cardiologist as outpatient.  -3/2021- for acute decompensated systolic HF and pericardial effusion on CT chest- cardiac cath was done on 3/4/2021 which showed minimal CAD, and patient was discharged on life vest.     PMH:  -NICM with EF of 10 to 15% also follow-up with cardiology on May 2022 in [address]  -History of multiple lung nodules measuring up to 7 mm- follows up with Dr Katherin Johnson  -H/o alcohol abuse  -Obesity    Social:  -Tobacco: 3/4 PPD per day for more than 25 years  -Alcohol: Occasional, last drink was yesterday  -Illicit Drug Use: Marijuana    Review of Systems:  Review of Systems   Constitutional: Negative for diaphoresis, fatigue and fever. HENT: Negative for facial swelling, hearing loss, sinus pain, sore throat and tinnitus. Respiratory: Positive for shortness of breath. Negative for cough, chest tightness and wheezing. Cardiovascular: Positive for chest pain. Negative for palpitations and leg swelling. Gastrointestinal: Positive for abdominal pain. Negative for constipation, diarrhea, nausea and vomiting. Endocrine: Negative. Genitourinary: Negative for difficulty urinating, dysuria, flank pain and hematuria. Musculoskeletal: Negative for back pain. Skin: Negative for color change. Allergic/Immunologic: Negative. Neurological: Negative for dizziness, seizures, weakness and headaches. Hematological: Negative. Psychiatric/Behavioral: Negative for agitation, confusion, hallucinations and suicidal ideas.          PAST MEDICAL HISTORY     Past Medical History:   Diagnosis Date    CHF (congestive heart failure) ([address]     Hyperlipidemia     Hypertension     Testicular torsion 2012    s/p Left orchiectomy       PAST SURGICAL HISTORY     Past Surgical History:   Procedure Laterality Date    KNEE ARTHROSCOPY Left 2009    [facility]    SHOULDER ARTHROPLASTY Left 1995    [facility]    TESTICLE REMOVAL Left 2012       ALLERGIES     Patient has no known allergies. MEDICATIONS PRIOR TO ADMISSION     Prior to Admission medications    Medication Sig Start Date End Date Taking? Authorizing Provider   enalapril (VASOTEC) 10 MG tablet Take 1 tablet by mouth daily 9/15/21   Pia Gutierres MD   carvedilol (COREG) 12.5 MG tablet Take 1 tablet by mouth 2 times daily (with meals) 9/15/21   Pia Gutierres MD   hydroCHLOROthiazide (HYDRODIURIL) 25 MG tablet Take 1 tablet by mouth daily 9/15/21   Pia Gutierres MD   atorvastatin (LIPITOR) 40 MG tablet Take 1 tablet by mouth nightly 3/5/21   Too Desai MD   furosemide (LASIX) 80 MG tablet Take 1 tablet by mouth daily 3/5/21   Too Desai MD   potassium chloride (KLOR-CON M) 10 MEQ extended release tablet Take 1 tablet by mouth daily 3/5/21   Too Desai MD       SOCIAL HISTORY     Social History     Tobacco Use    Smoking status: Current Every Day Smoker     Packs/day: 0.50     Years: 18.00     Pack years: 9.00     Types: Cigarettes    Smokeless tobacco: Never Used   Vaping Use    Vaping Use: Former    Substances: Never   Substance Use Topics    Alcohol use: Yes     Alcohol/week: 2.0 standard drinks     Types: 2 Cans of beer per week     Comment: 2 cans of beer daily    Drug use: Yes     Types: Marijuana Lanis Irene)     Comment: on 5/15/18 last used today         FAMILY HISTORY     Family History   Problem Relation Age of Onset    Diabetes Maternal Grandmother     Stroke Neg Hx     Heart Disease Neg Hx     Cancer Neg Hx        PHYSICAL EXAM     Vitals: BP (!) 173/129   Pulse 91   Temp 98.2 °F (36.8 °C) (Oral)   Resp 18   SpO2 97%   Tmax: Temp (24hrs), Av.2 °F (36.8 °C), Min:98.2 °F (36.8 °C), Max:98.2 °F (36.8 °C)    Last Body weight:   Wt Readings from Last 3 Encounters:   21 263 lb 8 oz (119.5 kg)   21 260 lb (117.9 kg)   09/15/21 260 lb (117.9 kg)     Body Mass Index : There is no height or weight on file to calculate BMI.       PHYSICAL EXAMINATION:  Physical Exam  Vitals reviewed. Constitutional:       General: He is awake. Appearance: Normal appearance. He is obese. He is ill-appearing. HENT:      Head: Normocephalic. Eyes:      General: Lids are normal.      Pupils: Pupils are equal, round, and reactive to light. Cardiovascular:      Rate and Rhythm: Normal rate and regular rhythm. Pulses: Normal pulses. Heart sounds: Normal heart sounds. Pulmonary:      Effort: Pulmonary effort is normal.      Breath sounds: Normal breath sounds and air entry. Abdominal:      Palpations: Abdomen is soft. Tenderness: There is abdominal tenderness (mild) in the epigastric area. Musculoskeletal:      Right lower leg: No swelling. Left lower leg: No swelling. Skin:     General: Skin is warm. Capillary Refill: Capillary refill takes less than 2 seconds. Neurological:      Mental Status: He is alert and oriented to person, place, and time. Psychiatric:         Attention and Perception: Attention normal.         Mood and Affect: Mood normal.         Speech: Speech normal.         Behavior: Behavior is cooperative.          INVESTIGATIONS     Laboratory Testing:     Recent Results (from the past 24 hour(s))   Brain Natriuretic Peptide    Collection Time: 01/31/22  1:02 PM   Result Value Ref Range    Pro-BNP 1,068 (H) <300 pg/mL    BNP Interpretation NOT REPORTED    CBC WITH AUTO DIFFERENTIAL    Collection Time: 01/31/22  1:02 PM   Result Value Ref Range    WBC 8.7 3.5 - 11.3 k/uL    RBC 5.94 (H) 4.21 - 5.77 m/uL    Hemoglobin 16.0 13.0 - 17.0 g/dL    Hematocrit 49.4 40.7 - 50.3 %    MCV 83.2 82.6 - 102.9 fL    MCH 26.9 25.2 - 33.5 pg    MCHC 32.4 28.4 - 34.8 g/dL    RDW 14.6 (H) 11.8 - 14.4 %    Platelets 502 689 - 499 k/uL    MPV 11.5 8.1 - 13.5 fL    NRBC Automated 0.0 0.0 per 100 WBC    Differential Type NOT REPORTED     Seg Neutrophils 68 (H) 36 - 65 %    Lymphocytes 23 (L) 24 - 43 %    Monocytes 8 3 - 12 %    Eosinophils % 1 1 - 4 %    Basophils 0 0 - 2 %    Immature Granulocytes 0 0 %    Segs Absolute 5.87 1.50 - 8.10 k/uL    Absolute Lymph # 2.01 1.10 - 3.70 k/uL    Absolute Mono # 0.65 0.10 - 1.20 k/uL    Absolute Eos # 0.10 0.00 - 0.44 k/uL    Basophils Absolute <0.03 0.00 - 0.20 k/uL    Absolute Immature Granulocyte 0.03 0.00 - 0.30 k/uL    WBC Morphology NOT REPORTED     RBC Morphology ANISOCYTOSIS PRESENT     Platelet Estimate NOT REPORTED    COMPREHENSIVE METABOLIC PANEL    Collection Time: 01/31/22  1:02 PM   Result Value Ref Range    Glucose 123 (H) 70 - 99 mg/dL    BUN 6 6 - 20 mg/dL    CREATININE 0.71 0.70 - 1.20 mg/dL    Bun/Cre Ratio NOT REPORTED 9 - 20    Calcium 8.8 8.6 - 10.4 mg/dL    Sodium 135 135 - 144 mmol/L    Potassium 4.0 3.7 - 5.3 mmol/L    Chloride 102 98 - 107 mmol/L    CO2 19 (L) 20 - 31 mmol/L    Anion Gap 14 9 - 17 mmol/L    Alkaline Phosphatase 109 40 - 129 U/L    ALT 22 5 - 41 U/L    AST 18 <40 U/L    Total Bilirubin 0.39 0.3 - 1.2 mg/dL    Total Protein 7.2 6.4 - 8.3 g/dL    Albumin 4.1 3.5 - 5.2 g/dL    Albumin/Globulin Ratio 1.3 1.0 - 2.5    GFR Non-African American >60 >60 mL/min    GFR African American >60 >60 mL/min    GFR Comment          GFR Staging NOT REPORTED    Troponin    Collection Time: 01/31/22  1:02 PM   Result Value Ref Range    Troponin, High Sensitivity 24 (H) 0 - 22 ng/L    Troponin T NOT REPORTED <0.03 ng/mL    Troponin Interp NOT REPORTED        Imaging:   XR CHEST PORTABLE    Result Date: 1/31/2022  No acute abnormality. ASSESSMENT & PLAN     IMPRESSION  This is a 37 y.o. male who presented with chest pain, abdominal pain and shortness of breath and found to have elevated BP. Patient admitted to inpatient status for the management of hypertensive urgency. Hypertensive urgency likely secondary to non-compliance to meds  -Wean off the nitro, will add esmolol drip. Resume home meds. Chest pain with elevated troponins, EKG normal  -Continue heparin drip. Continue to trend troponins.  Pain meds as needed. follow-up on urine tox. Epigastric abdominal pain with tenderness  -Follow-up on lipase. Continue IV Protonix daily. Dilated NICM with EF of 10 to 15%  -Stable. Continue HCTZ and coreg for now. Would benefit CHF education from discharge. History of alcohol use  -Continue thiamine, folic acid. Monitor for withdrawals. Prolonged QT  -Avoid QT prolonging medications. Replace K and Mg as needed. Marijuana use  -Advised on cessation. Follow-up on urine tox.     Chronic everyday smoker  -advised on cessation    DVT ppx: Heparin  GI ppx: Protonix  Diet: adult regular diet  PT/OT: Consulted  Case Management: Luda Clark MD  PGY-1, Internal Medicine Resident  [facility], [place]         1/31/2022, 3:27 PM

## 2022-01-31 NOTE — ED NOTES
Pt to ED with complaint of misternal chest pain that radiated to the left chest, started this morning when he woke up. Has a h/o CHF with EF of 15%, did not take his bp medication this morning. Reports er eval in september for chest pain and recommended admission at that time but didn't want to stay, patient has been to follow up with cardiology. Does have chronic cough, not worse then previously, has previous h/o alcohol use. Pt placed on monitor, EKG complete, blood obtained and sent to lab.       [address] [place] [address]  01/31/22 [id number]

## 2022-01-31 NOTE — ED PROVIDER NOTES
[facility]  FACULTY HANDOFF       Handoff taken on the following patient from prior Attending Physician:  Pt Name: [facility]  PCP:  Alonzo Cortes MD    Attestation  I was available and discussed any additional care issues that arose and coordinated the management plans with the resident(s) caring for the patient during my duty period. Any areas of disagreement with resident's documentation of care or procedures are noted on the chart. I was personally present for the key portions of any/all procedures during my duty period. I have documented in the chart those procedures where I was not present during the key portions.             Sara Holguin MD  01/31/22 [id number]

## 2022-01-31 NOTE — ED PROVIDER NOTES
[facility] ED  Emergency Department Encounter  EmergencyMedicine Resident     Pt Name:Dane Le  MRN: [id number]  [place] 1980  Date of evaluation: 1/31/22  PCP:  Jevon Alejo MD    This patient was evaluated in the Emergency Department for symptoms described in the history of present illness. The patient was evaluated in the context of the global COVID-19 pandemic, which necessitated consideration that the patient might be at risk for infection with the SARS-CoV-2 virus that causes COVID-19. Institutional protocols and algorithms that pertain to the evaluation of patients at risk for COVID-19 are in a state of rapid change based on information released by regulatory bodies including the CDC and federal and state organizations. These policies and algorithms were followed during the patient's care in the ED. CHIEF COMPLAINT       Chief Complaint   Patient presents with    Chest Pain       HISTORY OF PRESENT ILLNESS  (Location/Symptom, Timing/Onset, Context/Setting, Quality, Duration, Modifying Factors, Severity.)      Dane Ortiz is a 37 y.o. male who presents with substernal and left-sided chest pain that began at 07 30 this morning. Patient was resting comfortably in bed when this began. She has a history of cardiomyopathy with an ejection fraction was calculated around 10 to 15% and has a history of alcohol abuse she continues drinking less drink was yesterday. Is not on home oxygen arrives with supplemental nasal cannula however upon removal patient is satting 96% on room air has not taken any medications today including his hypertensive medications secondary to feeling ill. He endorses abdominal swelling and distention.   Some nausea some decreased p.o. intake but no vomiting no diaphoresis no cough    PAST MEDICAL / SURGICAL / SOCIAL / FAMILY HISTORY      has a past medical history of CHF (congestive heart failure) ([facility] [address] Hyperlipidemia, Hypertension, and Testicular torsion. has a past surgical history that includes Total shoulder arthroplasty (Left, 1995); Knee arthroscopy (Left, 2009); and Testicle removal (Left, 2012). Social History     Socioeconomic History    Marital status: Single     Spouse name: Not on file    Number of children: 11    Years of education: 15    Highest education level: Not on file   Occupational History    Occupation: steel industry   Tobacco Use    Smoking status: Current Every Day Smoker     Packs/day: 0.50     Years: 18.00     Pack years: 9.00     Types: Cigarettes    Smokeless tobacco: Never Used   Vaping Use    Vaping Use: Former    Substances: Never   Substance and Sexual Activity    Alcohol use: Yes     Alcohol/week: 2.0 standard drinks     Types: 2 Cans of beer per week     Comment: 2 cans of beer daily    Drug use: Yes     Types: Marijuana [place])     Comment: on 5/15/18 last used today    Sexual activity: Yes     Partners: Female     Birth control/protection: Condom   Other Topics Concern    Not on file   Social History Narrative    Diet - minimizes fast food. No t much fruits and veggies    Caffeine intake per day - 4+ cups and 2 liter of pop per day    Exercise pattern - yes. 3x week    Living situation - duplex with kids and ferret    How would you describe yourself as a ? safe    Any MVA's in the past 2 years that you were at fault for?no    Any tickets in the past 2 years?no    How often do you wear a seatbelt? yes     Social Determinants of Health     Financial Resource Strain:     Difficulty of Paying Living Expenses: Not on file   Food Insecurity:     Worried About Running Out of Food in the Last Year: Not on file    [place] of Food in the Last Year: Not on file   Transportation Needs:     Lack of Transportation (Medical): Not on file    Lack of Transportation (Non-Medical):  Not on file   Physical Activity:     Days of Exercise per Week: Not on file    Minutes of Exercise per Session: Not on file   Stress:     Feeling of Stress : Not on file   Social Connections:     Frequency of Communication with Friends and Family: Not on file    Frequency of Social Gatherings with Friends and Family: Not on file    Attends Zoroastrian Services: Not on file    Active Member of [address] [place] [address] [employer] or Organizations: Not on file    Attends Club or Organization Meetings: Not on file    Marital Status: Not on file   Intimate Partner Violence:     Fear of Current or Ex-Partner: Not on file    Emotionally Abused: Not on file    Physically Abused: Not on file    Sexually Abused: Not on file   Housing Stability:     Unable to Pay for Housing in the Last Year: Not on file    Number of [place] in the Last Year: Not on file    Unstable Housing in the Last Year: Not on file       Family History   Problem Relation Age of Onset    Diabetes Maternal Grandmother     Stroke Neg Hx     Heart Disease Neg Hx     Cancer Neg Hx        Allergies:  Patient has no known allergies. Home Medications:  Prior to Admission medications    Medication Sig Start Date End Date Taking? Authorizing Provider   enalapril (VASOTEC) 10 MG tablet Take 1 tablet by mouth daily 9/15/21   Alyssa Aguilar MD   carvedilol (COREG) 12.5 MG tablet Take 1 tablet by mouth 2 times daily (with meals) 9/15/21   Alyssa Aguilar MD   hydroCHLOROthiazide (HYDRODIURIL) 25 MG tablet Take 1 tablet by mouth daily 9/15/21   Alyssa Aguilar MD   atorvastatin (LIPITOR) 40 MG tablet Take 1 tablet by mouth nightly 3/5/21   Uziel Galeano MD   furosemide (LASIX) 80 MG tablet Take 1 tablet by mouth daily 3/5/21   Uziel Galeano MD   potassium chloride (KLOR-CON M) 10 MEQ extended release tablet Take 1 tablet by mouth daily 3/5/21   Uziel Galeano MD       REVIEW OF SYSTEMS    (2-9 systems for level 4, 10 or more for level 5)      Review of Systems   Constitutional: Negative for diaphoresis and fever. HENT: Negative for congestion, rhinorrhea and sore throat. Eyes: Negative for photophobia and redness. Respiratory: Negative for shortness of breath. Cardiovascular: Positive for chest pain. Gastrointestinal: Negative for abdominal pain, diarrhea, nausea and vomiting. Genitourinary: Negative for flank pain. Musculoskeletal: Negative for neck pain. Skin: Negative for pallor, rash and wound. Allergic/Immunologic: Negative for environmental allergies and food allergies. Neurological: Negative for syncope and numbness. Hematological: Negative for adenopathy. Psychiatric/Behavioral: Negative for agitation and confusion. PHYSICAL EXAM   (up to 7 for level 4, 8 or more for level 5)      INITIAL VITALS:   BP (!) 200/130   Pulse 103   Temp 98.2 °F (36.8 °C) (Oral)   Resp 18   SpO2 96%     Physical Exam  Vitals and nursing note reviewed. Constitutional:       General: He is not in acute distress. Appearance: He is obese. He is not toxic-appearing. HENT:      Head: Normocephalic and atraumatic. Right Ear: External ear normal.      Left Ear: External ear normal.      Nose: Nose normal.      Mouth/Throat:      Pharynx: Oropharynx is clear. Eyes:      Conjunctiva/sclera: Conjunctivae normal.   Cardiovascular:      Rate and Rhythm: Tachycardia present. Pulses: Normal pulses. Pulmonary:      Effort: Pulmonary effort is normal. No respiratory distress. Abdominal:      Tenderness: There is no abdominal tenderness. Musculoskeletal:      Cervical back: Normal range of motion. Right lower leg: No edema. Left lower leg: No edema. Skin:     General: Skin is warm. Capillary Refill: Capillary refill takes less than 2 seconds. Neurological:      General: No focal deficit present. Mental Status: He is alert.    Psychiatric:         Mood and Affect: Mood normal.         DIFFERENTIAL  DIAGNOSIS     PLAN (LABS / IMAGING / EKG):  Orders Placed This Encounter   Procedures    COVID-19, Rapid    XR CHEST PORTABLE    Brain Natriuretic Peptide    CBC WITH AUTO DIFFERENTIAL    COMPREHENSIVE METABOLIC PANEL    Troponin    EKG 12 Lead       MEDICATIONS ORDERED:  Orders Placed This Encounter   Medications    aspirin chewable tablet 324 mg    enalapril (VASOTEC) tablet 10 mg    furosemide (LASIX) injection 80 mg    DISCONTD: carvedilol (COREG) tablet 12.5 mg    hydroCHLOROthiazide (HYDRODIURIL) tablet 25 mg    thiamine (B-1) 200 mg in sodium chloride 0.9 % 100 mL IVPB    carvedilol (COREG) tablet 12.5 mg       DDX: chf, acs, atypical chest pain    DIAGNOSTIC RESULTS / EMERGENCY DEPARTMENT COURSE / MDM   LAB RESULTS:  No results found for this visit on 01/31/22. IMPRESSION: Patient is a 27-year-old obese male who presents with sudden onset of substernal and left-sided sharp chest pain no vomiting no diaphoresis some nausea significant cardiac history with cardiomyopathy. Last cardiac catheterization 3/2/2021 showing EF of 15%. Plan will be missed home meds, acs workup likely admission    RADIOLOGY:  XR CHEST PORTABLE    Result Date: 1/31/2022  EXAMINATION: ONE XRAY VIEW OF THE CHEST 1/31/2022 1:07 pm COMPARISON: 11/04/2021 HISTORY: Acute left chest pain. FINDINGS: Unchanged mildly enlarged cardiomediastinal silhouette. No acute airspace disease, pleural effusion, or pneumothorax. Right hemidiaphragm remains mildly elevated. No acute abnormality.        EKG  Sinus rhythm at a rate of 89 there is leftward axis is a left anterior fascicular block left ventricular hypertrophy by aVL and limb lead criteria QTC is 472 SC interval 154 QRS duration prolonged at 106 leftbundle branch block pattern with interventricular conduction delay T wave inversion in V6 compared [id number] 13:02:55 the twi in lead v6 is new    All EKG's are interpreted by the Emergency Department Physician who either signs or Co-signs this chart in the absence of a cardiologist.    EMERGENCY DEPARTMENT COURSE:  ED Course as of 01/31/22 2018 Mon Jan 31, 2022 1259     Procedure Summary      LMCA: Normal 0% stenosis. LAD: Mild irregularities 10-20%. LCx: Mild irregularities 20-30%. RCA: Mild irregularities 10-20%. SEVERE LV SYSTOLIC DYSFUNCTION WITH LVEF OF 15%   DILATED AORTIC ROOT, AND SINUSES OF VALSALVA NOTED. MILD PULMONARY HTN NOTED ON RIGHT HEART CATH. Recommendations      1. CONTINUE AFTER LOAD REDUCTION THERAPY. 2. CESSATION OF ALCOHOL USE. 3. SMOKING CESSATION. 4. ELECTIVE ICD IMPLANT PER SCDHEFT STUDY CRITERIA., IF LVEF DOES NOT   IMPTOVE IN THE FUTURE. [BG]   1332 Cardiomegaly on xr [BG]   1534 Case discussed with medicine resident, admitted [BG]   1543 Trops uptrending. Heparin, atorvastatin [BG]      ED Course User Index  [BG] aKrly Sutton DO         PROCEDURES:      CONSULTS:  None    CRITICAL CARE:      FINAL IMPRESSION      1. Chest pain, unspecified type          DISPOSITION / PLAN     DISPOSITION  01/31/2022 03:26:43 PM      PATIENT REFERRED TO:  No follow-up provider specified.     DISCHARGE MEDICATIONS:  New Prescriptions    No medications on file       Karly Sutton DO  Emergency Medicine Resident    (Please note that portions of thisnote were completed with a voice recognition program.  Efforts were made to edit the dictations but occasionally words are mis-transcribed.)       Karly Sutton DO  Resident  01/31/22 2019

## 2022-01-31 NOTE — ED PROVIDER NOTES
[facility]     Emergency Department     Faculty Attestation    I performed a history and physical examination of the patient and discussed management with the resident. I have reviewed and agree with the residents findings including all diagnostic interpretations, and treatment plans as written. Any areas of disagreement are noted on the chart. I was personally present for the key portions of any procedures. I have documented in the chart those procedures where I was not present during the key portions. I have reviewed the emergency nurses triage note. I agree with the chief complaint, past medical history, past surgical history, allergies, medications, social and family history as documented unless otherwise noted below. Documentation of the HPI, Physical Exam and Medical Decision Making performed by garrett is based on my personal performance of the HPI, PE and MDM. For Physician Assistant/ Nurse Practitioner cases/documentation I have personally evaluated this patient and have completed at least one if not all key elements of the E/M (history, physical exam, and MDM). Additional findings are as noted. Primary Care Physician: Fransisca Yo MD    History: This is a 37 y.o. male who presents to the Emergency Department with complaint of misternal chest pain that radiated to the left chest, started this morning when he woke up. Has a h/o CHF with EF of 15%, did not take his bp medication this morning. Reports er eval in september for chest pain and recommended admission at that time but didn't want to stay, patient has been to follow up with cardiology. Does have chronic cough, not worse then previously, has previous h/o alcohol use. Which is what chart attributed his cardiomyopathy to. No worsening sob.       Patient on exam is well appearing, speaking in full sentences  No resp distress  1+pitting edema noted bilaterally  Patient hypertensive, in 200s systolically  Did recived nitro sl and spray by ems prior to arriving in ER. Bp pers ems was 180s/140s    Patient with concern for possible hypertensive emergency versus urgency. Plan on EKG we will plan on lowering blood pressure. For symptomatic chest pain relief. Check troponin check BMP, patient will need admission, will need additional echo, given low EF in the past.  EKG Interpretation    Interpreted by me    EKG shows normal sinus rhythm with left axis deviation, with a left anterior fascicular block. No ST elevations or depressions noted T wave inversion noted in V6 which is new on today's EKG. There are nonspecific T wave changes noted in aVL, subtle ST depressions. Q waves noted in anterior leads with anterior infarct of age undetermined.         Gertrude Byrnes D.O, M.P.H  Attending Emergency Medicine Physician         Gertrude Byrnes DO  01/31/22 4845

## 2022-01-31 NOTE — PROGRESS NOTES
Internal Medicine Teaching Service Senior Note      This is a 37 y.o. male admitted 1/31/2022 for NSTEMI (non-ST elevated myocardial infarction) ([facility] [address] [I21.4]  Hypertensive urgency [I16.0]  Chest pain, unspecified type [R07.9]. Patient came in with Chief complaint of   Chief Complaint   Patient presents with    Chest Pain   She presented to the ER with chest pain which started this morning, was retrosternal and associated with diaphoresis. Patient reports pain to be severe 10 on 10, stabbing and cramping in nature and was constant. No aggravating or relieving factors associated with pain. EMS was called and patient received 3 doses of nitro without any significant improvement. Patient had similar episode in September and prior to that in March 2021. As per the patient in March 2021 when he had this episode patient was found to have pericardial effusion for which he underwent pericardiocentesis and cardiac catheterization which showed nonischemic cardiomyopathy with EF of 15%. Patient did not follow-up with any cardiology. Patient denies any fevers or chills. Patient also reported stomach pain which comes and goes, but has been constant since morning, however it is not associated with any diarrhea or constipation, patient reports nausea but denies any vomiting. Patient also has a history of hypertension for which he takes enalapril 10, Coreg 12.5 twice daily, hydrochlorothiazide 25, Lasix 80, potassium chloride 10, Lipitor 4, reports that he is compliant for most part but he did not take his medications today. Patient uses marijuana, drinks occasionally however his last alcoholic drink was yesterday, denies any alcohol withdrawal in the past.  Patient also reports smoking 1 pack/day for last 20 years. Patient has received his Covid vaccine. See H&P of admitting/intern resident for more details.      PMH/Home meds:   Hypertension-enalapril, Coreg, hydrochlorothiazide, Lasix   Nonischemic cardiomyopathy with CHF-on Lasix and hydrochlorothiazide-Lasix 80 mg daily and hydrochlorothiazide 25 mg daily   Morbid obesity   Questionable compliance   History of alcohol use disorder, and currently admits to drinking occasionally however last drink was on Sunday.  Hyperlipidemia-Lipitor   Marijuana use disorder   GERD   CT abdomen suggestive of chronic gastritis. TSH 2021- normal  ECHO-March 2021-EF 20%, mild to moderate LVH, severe global hypokinesis, trivial aortic insufficiency mild mitral regurgitation, mild tricuspid regurgitation, RVSP 30 mm, small pericardial effusion. Cardiac cath-March 2021-severe nonischemic cardiomyopathy with EF 15 to 20%, dilated sinuses of Valsalva aortic root and ascending aorta, mild pulmonary hypertension on right cardiac catheterization. ER Course  Vitals:    02/01/22 0100   BP: (!) 162/118   Pulse: 95   Resp: 10   Temp:    SpO2:        BMP:   Recent Labs     01/31/22  1302      K 4.0      CO2 19*   BUN 6   CREATININE 0.71   GLUCOSE 123*     CBC: )  Recent Labs     01/31/22  1302   WBC 8.7   HGB 16.0   HCT 49.4         In ER, patient's initial blood pressure was 200/130 with heart rate and 100s, was on room air and saturating well. Initial labs showed BMP unremarkable, elevated proBNP thousand 68, tropes flat 24-37-37, lipase 13, LFTs normal, blood glucose slightly elevated 123, CBC unremarkable.     Chest x-ray unremarkable, EKG normal sinus rhythm with LVH, nonspecific T wave abnormality,     Assessment    Principal Problem:    Hypertensive urgency  Active Problems:    Essential hypertension    Current every day smoker    Marijuana smoker    Elevated troponin    Class 2 obesity due to excess calories without serious comorbidity with body mass index (BMI) of 35.0 to 35.9 in adult    Prolonged Q-T interval on ECG    Multiple pulmonary nodules    Non-ischemic cardiomyopathy (HCC)    Alcohol abuse    Epigastric pain Elevated brain natriuretic peptide (BNP) level  Resolved Problems:    * No resolved hospital problems. *        Plan     1. As needed fentanyl for pain  2. Hypertensive urgency-started on nitro drip, resume home meds Coreg 12.5 twice daily, enalapril 10, hydrochlorothiazide 25. Patient also received one-time dose of 80. IV Lasix. As needed labetalol. Wean off nitro drip as tolerated. 3. Alcohol use disorder-watch for withdrawal, start thiamine folic acid and multivitamin. 4. ?  Gastritis-Protonix  5. Blood glucose icdifm-svqtrc-rb on HbA1c  6. Replace electrolytes as needed  7. Follow-up on urine drug screen  8. Lipase levels normal  9. Nausea likely due to marijuana use-patient counseled to quit.     Nutrition/Diet:  Cardiac diet    DVT Prophylaxis:  Lovenox prophylactic dose-30 twice daily        Darin Jacinto MD  Internal Medicine Resident  [facility]  2/1/2022 2:52 AM

## 2022-01-31 NOTE — PLAN OF CARE
Problem: Pain:  Goal: Pain level will decrease  Description: Pain level will decrease  Outcome: Ongoing  Goal: Control of acute pain  Description: Control of acute pain  Outcome: Ongoing  Goal: Control of chronic pain  Description: Control of chronic pain  Outcome: Ongoing  Goal: Patient's pain/discomfort is manageable  Description: Patient's pain/discomfort is manageable  Outcome: Ongoing     Problem: Infection:  Goal: Will remain free from infection  Description: Will remain free from infection  Outcome: Ongoing     Problem: Safety:  Goal: Free from accidental physical injury  Description: Free from accidental physical injury  Outcome: Ongoing  Goal: Free from intentional harm  Description: Free from intentional harm  Outcome: Ongoing     Problem: Daily Care:  Goal: Daily care needs are met  Description: Daily care needs are met  Outcome: Ongoing     Problem: Skin Integrity:  Goal: Skin integrity will stabilize  Description: Skin integrity will stabilize  Outcome: Ongoing     Problem: Discharge Planning:  Goal: Patients continuum of care needs are met  Description: Patients continuum of care needs are met  Outcome: Ongoing     Problem: OXYGENATION/RESPIRATORY FUNCTION  Goal: Patient will maintain patent airway  Outcome: Ongoing  Goal: Patient will achieve/maintain normal respiratory rate/effort  Description: Respiratory rate and effort will be within normal limits for the patient  Outcome: Ongoing     Problem: HEMODYNAMIC STATUS  Goal: Patient has stable vital signs and fluid balance  Outcome: Ongoing     Problem: FLUID AND ELECTROLYTE IMBALANCE  Goal: Fluid and electrolyte balance are achieved/maintained  Outcome: Ongoing     Problem: ACTIVITY INTOLERANCE/IMPAIRED MOBILITY  Goal: Mobility/activity is maintained at optimum level for patient  Outcome: Ongoing

## 2022-01-31 NOTE — ED NOTES
Bed: 08  Expected date:   Expected time:   Means of arrival:   Comments:  [address] [place], RN  01/31/22 [id number]

## 2022-02-01 LAB
ABSOLUTE EOS #: 0.09 K/UL (ref 0–0.44)
ABSOLUTE IMMATURE GRANULOCYTE: 0.04 K/UL (ref 0–0.3)
ABSOLUTE LYMPH #: 2.24 K/UL (ref 1.1–3.7)
ABSOLUTE MONO #: 0.81 K/UL (ref 0.1–1.2)
ANION GAP SERPL CALCULATED.3IONS-SCNC: 17 MMOL/L (ref 9–17)
BASOPHILS # BLD: 0 % (ref 0–2)
BASOPHILS ABSOLUTE: <0.03 K/UL (ref 0–0.2)
BUN BLDV-MCNC: 7 MG/DL (ref 6–20)
BUN/CREAT BLD: ABNORMAL (ref 9–20)
CALCIUM SERPL-MCNC: 9.6 MG/DL (ref 8.6–10.4)
CHLORIDE BLD-SCNC: 94 MMOL/L (ref 98–107)
CO2: 21 MMOL/L (ref 20–31)
CREAT SERPL-MCNC: 0.94 MG/DL (ref 0.7–1.2)
DIFFERENTIAL TYPE: ABNORMAL
EOSINOPHILS RELATIVE PERCENT: 1 % (ref 1–4)
ESTIMATED AVERAGE GLUCOSE: 126 MG/DL
GFR AFRICAN AMERICAN: >60 ML/MIN
GFR NON-AFRICAN AMERICAN: >60 ML/MIN
GFR SERPL CREATININE-BSD FRML MDRD: ABNORMAL ML/MIN/{1.73_M2}
GFR SERPL CREATININE-BSD FRML MDRD: ABNORMAL ML/MIN/{1.73_M2}
GLUCOSE BLD-MCNC: 132 MG/DL (ref 70–99)
HBA1C MFR BLD: 6 % (ref 4–6)
HCT VFR BLD CALC: 50.9 % (ref 40.7–50.3)
HEMOGLOBIN: 16.8 G/DL (ref 13–17)
IMMATURE GRANULOCYTES: 0 %
LYMPHOCYTES # BLD: 21 % (ref 24–43)
MCH RBC QN AUTO: 27.4 PG (ref 25.2–33.5)
MCHC RBC AUTO-ENTMCNC: 33 G/DL (ref 28.4–34.8)
MCV RBC AUTO: 82.9 FL (ref 82.6–102.9)
MONOCYTES # BLD: 8 % (ref 3–12)
NRBC AUTOMATED: 0 PER 100 WBC
PDW BLD-RTO: 15 % (ref 11.8–14.4)
PLATELET # BLD: 290 K/UL (ref 138–453)
PLATELET ESTIMATE: ABNORMAL
PMV BLD AUTO: 10.1 FL (ref 8.1–13.5)
POTASSIUM SERPL-SCNC: 3.7 MMOL/L (ref 3.7–5.3)
RBC # BLD: 6.14 M/UL (ref 4.21–5.77)
RBC # BLD: ABNORMAL 10*6/UL
SEG NEUTROPHILS: 70 % (ref 36–65)
SEGMENTED NEUTROPHILS ABSOLUTE COUNT: 7.4 K/UL (ref 1.5–8.1)
SODIUM BLD-SCNC: 132 MMOL/L (ref 135–144)
TROPONIN INTERP: ABNORMAL
TROPONIN T: ABNORMAL NG/ML
TROPONIN, HIGH SENSITIVITY: 37 NG/L (ref 0–22)
WBC # BLD: 10.6 K/UL (ref 3.5–11.3)
WBC # BLD: ABNORMAL 10*3/UL

## 2022-02-01 PROCEDURE — 80048 BASIC METABOLIC PNL TOTAL CA: CPT

## 2022-02-01 PROCEDURE — 6370000000 HC RX 637 (ALT 250 FOR IP)

## 2022-02-01 PROCEDURE — 36415 COLL VENOUS BLD VENIPUNCTURE: CPT

## 2022-02-01 PROCEDURE — 85025 COMPLETE CBC W/AUTO DIFF WBC: CPT

## 2022-02-01 PROCEDURE — 6370000000 HC RX 637 (ALT 250 FOR IP): Performed by: STUDENT IN AN ORGANIZED HEALTH CARE EDUCATION/TRAINING PROGRAM

## 2022-02-01 PROCEDURE — 84484 ASSAY OF TROPONIN QUANT: CPT

## 2022-02-01 PROCEDURE — 83036 HEMOGLOBIN GLYCOSYLATED A1C: CPT

## 2022-02-01 PROCEDURE — 2060000000 HC ICU INTERMEDIATE R&B

## 2022-02-01 PROCEDURE — 6360000002 HC RX W HCPCS

## 2022-02-01 PROCEDURE — 6360000002 HC RX W HCPCS: Performed by: STUDENT IN AN ORGANIZED HEALTH CARE EDUCATION/TRAINING PROGRAM

## 2022-02-01 PROCEDURE — 2580000003 HC RX 258

## 2022-02-01 PROCEDURE — C9113 INJ PANTOPRAZOLE SODIUM, VIA: HCPCS

## 2022-02-01 PROCEDURE — 99223 1ST HOSP IP/OBS HIGH 75: CPT | Performed by: INTERNAL MEDICINE

## 2022-02-01 RX ORDER — SPIRONOLACTONE 25 MG/1
25 TABLET ORAL ONCE
Status: COMPLETED | OUTPATIENT
Start: 2022-02-02 | End: 2022-02-01

## 2022-02-01 RX ORDER — FUROSEMIDE 40 MG/1
80 TABLET ORAL DAILY
Status: DISCONTINUED | OUTPATIENT
Start: 2022-02-02 | End: 2022-02-01

## 2022-02-01 RX ORDER — LABETALOL HYDROCHLORIDE 5 MG/ML
10 INJECTION, SOLUTION INTRAVENOUS EVERY 4 HOURS PRN
Status: DISCONTINUED | OUTPATIENT
Start: 2022-02-01 | End: 2022-02-03 | Stop reason: HOSPADM

## 2022-02-01 RX ORDER — FUROSEMIDE 40 MG/1
80 TABLET ORAL DAILY
Status: DISCONTINUED | OUTPATIENT
Start: 2022-02-01 | End: 2022-02-03 | Stop reason: HOSPADM

## 2022-02-01 RX ORDER — HYDROXYZINE HYDROCHLORIDE 10 MG/1
10 TABLET, FILM COATED ORAL ONCE
Status: COMPLETED | OUTPATIENT
Start: 2022-02-01 | End: 2022-02-01

## 2022-02-01 RX ADMIN — CARVEDILOL 12.5 MG: 12.5 TABLET, FILM COATED ORAL at 08:18

## 2022-02-01 RX ADMIN — SPIRONOLACTONE 25 MG: 25 TABLET ORAL at 23:07

## 2022-02-01 RX ADMIN — CARVEDILOL 12.5 MG: 12.5 TABLET, FILM COATED ORAL at 18:17

## 2022-02-01 RX ADMIN — HYDROXYZINE HYDROCHLORIDE 10 MG: 10 TABLET ORAL at 02:04

## 2022-02-01 RX ADMIN — Medication 100 MG: at 08:18

## 2022-02-01 RX ADMIN — FENTANYL CITRATE 25 MCG: 50 INJECTION, SOLUTION INTRAMUSCULAR; INTRAVENOUS at 01:30

## 2022-02-01 RX ADMIN — Medication 1 TABLET: at 08:18

## 2022-02-01 RX ADMIN — ACETAMINOPHEN 650 MG: 325 TABLET ORAL at 22:30

## 2022-02-01 RX ADMIN — BISMUTH SUBSALICYLATE 524 MG: 262 TABLET, CHEWABLE ORAL at 08:18

## 2022-02-01 RX ADMIN — SODIUM CHLORIDE, PRESERVATIVE FREE 10 ML: 5 INJECTION INTRAVENOUS at 22:30

## 2022-02-01 RX ADMIN — SODIUM CHLORIDE, PRESERVATIVE FREE 10 ML: 5 INJECTION INTRAVENOUS at 01:32

## 2022-02-01 RX ADMIN — HYDROCHLOROTHIAZIDE 25 MG: 25 TABLET ORAL at 11:18

## 2022-02-01 RX ADMIN — ENALAPRIL MALEATE 10 MG: 10 TABLET ORAL at 08:18

## 2022-02-01 RX ADMIN — FOLIC ACID 1 MG: 1 TABLET ORAL at 08:39

## 2022-02-01 RX ADMIN — BISMUTH SUBSALICYLATE 524 MG: 262 TABLET, CHEWABLE ORAL at 18:17

## 2022-02-01 RX ADMIN — PANTOPRAZOLE SODIUM 40 MG: 40 INJECTION, POWDER, LYOPHILIZED, FOR SOLUTION INTRAVENOUS at 08:18

## 2022-02-01 RX ADMIN — BISMUTH SUBSALICYLATE 524 MG: 262 TABLET, CHEWABLE ORAL at 11:17

## 2022-02-01 ASSESSMENT — PAIN SCALES - GENERAL
PAINLEVEL_OUTOF10: 9
PAINLEVEL_OUTOF10: 0
PAINLEVEL_OUTOF10: 8
PAINLEVEL_OUTOF10: 3

## 2022-02-01 NOTE — PLAN OF CARE
Problem: Pain:  Description: Pain management should include both nonpharmacologic and pharmacologic interventions.   Goal: Pain level will decrease  Description: Pain level will decrease  1/31/2022 1920 by Eliceo Rubio RN  Outcome: Ongoing  1/31/2022 1852 by Drew Headley RN  Outcome: Ongoing  Goal: Control of acute pain  Description: Control of acute pain  1/31/2022 1920 by Eliceo Rubio RN  Outcome: Ongoing  1/31/2022 1852 by Drew Headley RN  Outcome: Ongoing  Goal: Control of chronic pain  Description: Control of chronic pain  1/31/2022 1920 by Eliceo Rubio RN  Outcome: Ongoing  1/31/2022 1852 by Drew Headley RN  Outcome: Ongoing  Goal: Patient's pain/discomfort is manageable  Description: Patient's pain/discomfort is manageable  1/31/2022 1920 by Eliceo Rubio RN  Outcome: Ongoing  1/31/2022 1852 by Drew Headley RN  Outcome: Ongoing     Problem: Infection:  Goal: Will remain free from infection  Description: Will remain free from infection  1/31/2022 1920 by Eliceo Rubio RN  Outcome: Ongoing  1/31/2022 1852 by Drew Headley RN  Outcome: Ongoing     Problem: Safety:  Goal: Free from accidental physical injury  Description: Free from accidental physical injury  1/31/2022 1920 by Eliceo Rubio RN  Outcome: Ongoing  1/31/2022 1852 by Drew Headley RN  Outcome: Ongoing  Goal: Free from intentional harm  Description: Free from intentional harm  1/31/2022 1920 by Eliceo Rubio RN  Outcome: Ongoing  1/31/2022 1852 by Drew Headley RN  Outcome: Ongoing     Problem: Daily Care:  Goal: Daily care needs are met  Description: Daily care needs are met  1/31/2022 1920 by Eliceo Rubio RN  Outcome: Ongoing  1/31/2022 1852 by Drew Headley RN  Outcome: Ongoing     Problem: Skin Integrity:  Goal: Skin integrity will stabilize  Description: Skin integrity will stabilize  1/31/2022 1920 by Eliceo Rubio RN  Outcome: Ongoing  1/31/2022 1852 by Drew Headley RN  Outcome: Ongoing     Problem: Discharge Planning:  Goal: Patients continuum of care needs are met  Description: Patients continuum of care needs are met  1/31/2022 1920 by Natalie Torrez RN  Outcome: Ongoing  1/31/2022 1852 by Vickie Ring RN  Outcome: Ongoing     Problem: OXYGENATION/RESPIRATORY FUNCTION  Goal: Patient will maintain patent airway  1/31/2022 1920 by Natalie Torrez RN  Outcome: Ongoing  1/31/2022 1852 by Vickie Ring RN  Outcome: Ongoing  Goal: Patient will achieve/maintain normal respiratory rate/effort  Description: Respiratory rate and effort will be within normal limits for the patient  1/31/2022 1920 by Natalie Torrez RN  Outcome: Ongoing  1/31/2022 1852 by Vickie Ring RN  Outcome: Ongoing     Problem: HEMODYNAMIC STATUS  Goal: Patient has stable vital signs and fluid balance  1/31/2022 1920 by Natalie Torrez RN  Outcome: Ongoing  1/31/2022 1852 by Vickei Ring RN  Outcome: Ongoing     Problem: FLUID AND ELECTROLYTE IMBALANCE  Goal: Fluid and electrolyte balance are achieved/maintained  1/31/2022 1920 by Natalie Torrez RN  Outcome: Ongoing  1/31/2022 1852 by Vickie Ring RN  Outcome: Ongoing     Problem: ACTIVITY INTOLERANCE/IMPAIRED MOBILITY  Goal: Mobility/activity is maintained at optimum level for patient  1/31/2022 1920 by Natalie Torrez RN  Outcome: Ongoing  1/31/2022 1852 by Vickie Ring RN  Outcome: Ongoing

## 2022-02-01 NOTE — PROGRESS NOTES
last 2 BP's 180/132 and 194/135 . ... Nitro is running at 40 mcg . Rometta Favre Rometta Favre Rometta Favre Rometta Favre  per orders will increase rate to 50 mcg/min

## 2022-02-01 NOTE — PROGRESS NOTES
[facility]  Internal Medicine Teaching Residency Program  Inpatient Daily Progress Note  ______________________________________________________________________________    Patient: Leonel Trevino  YOB: 1980   UNN:[id number]    Acct: [de-identified]     Room: 2014/2014-01  Admit date: 1/31/2022  Today's date: 02/01/22  Number of days in the hospital: 1    SUBJECTIVE   Admitting Diagnosis: Hypertensive urgency  CC: Cough shortness of breath and orthopnea. Pt examined at bedside. Chart & results reviewed. Blood pressure is 100/80. Pulse 76. Remain afebrile. Saturating on room air. Labs reviewed and evaluated. Sodium 132 potassium 3.7 chloride 94. BUN 7 creatinine 0.94. Troponin remained flat. ROS:  Constitutional:  negative for chills, fevers, sweats  Respiratory: Shortness of breath orthopnea and PND. Denies hemoptysis. Cardiovascular:  negative for chest pain, chest pressure/discomfort, lower extremity edema, palpitations  Gastrointestinal:  negative for abdominal pain, constipation, diarrhea, nausea, vomiting  Neurological:  negative for dizziness, headache  BRIEF HISTORY     The patient is a 37 y.o. male who presented to the ED complaining of chest pain on the left side, epigastric abdominal pain and shortness of breath. Patient has a past medical history of an ICM with EF of 10 to 15%. Patient stated that he was lying comfortably on the bed when all of this started which woke him up. He describes the chest pain to be located in the center and left side, 6 out of 10, stabbing in nature and worse when taking a deep breath. His abdominal pain is located in the epigastric region, he described it as colicky, associated with nausea but no vomiting or diarrhea. EMS was called, patient did receive sublingual nitro and spray 2 times on his way to the ED here. As per patient he was admitted to [facility] in September for similar complaint.   Patient admits to not taking his antihypertensive medications this morning.     On arrival to the ED, patient was on nasal cannula, was removed and was saturating well on room air. BP was high 200s/130, heart rate 103, otherwise other vital signs stable. Patient was started on nitro drip for hypertensive emergency. Pertinent labs were ordered which showed elevated proBNP, uptrending troponins 24> 37. EKG showed normal sinus rhythm with left axis deviation. Patient was started on low-dose heparin. Chest x-ray showed no acute abnormality. Covid negative. Subsequently heparin was discontinued as troponins remain flat. Cardiology consult was placed. Recommend 80 mg of p.o. Lasix with addition of Aldactone 25 mg once daily. Will need outpatient follow-up with cardiology clinic. OBJECTIVE     Vital Signs:  BP (!) 102/90   Pulse 76   Temp 98.4 °F (36.9 °C) (Oral)   Resp 21   Ht 6' 4\" (1.93 m)   Wt 265 lb (120.2 kg)   SpO2 90%   BMI 32.26 kg/m²     Temp (24hrs), Av.9 °F (36.6 °C), Min:97.3 °F (36.3 °C), Max:98.4 °F (36.9 °C)    No intake/output data recorded. Physical Exam:  Constitutional: This is a well developed, well nourished, 30-34.9 - Obesity Grade I 37y.o. year old male who is alert, oriented, cooperative and in no apparent distress. Head:normocephalic and atraumatic. EENT:  PERRLA. No conjunctival injections. Septum was midline, mucosa was without erythema, exudates or cobblestoning. No thrush was noted. Neck: Supple without thyromegaly. No elevated JVP. Trachea was midline. Respiratory: Chest was symmetrical without dullness to percussion. Chest auscultation showed bilateral basal crackles without wheezes. There is no intercostal retraction or use of accessory muscles. No egophony noted. Cardiovascular: Regular without murmur, clicks, gallops or rubs. Abdomen: Slightly rounded and soft without organomegaly. No rebound, rigidity or guarding was appreciated.     Lymphatic: No lymphadenopathy. Musculoskeletal: Normal curvature of the spine. No gross muscle weakness. Extremities:  No lower extremity edema, ulcerations, tenderness, varicosities or erythema. Muscle size, tone and strength are normal.  No involuntary movements are noted. Skin:  Warm and dry. Good color, turgor and pigmentation. No lesions or scars.   No cyanosis or clubbing  Neurological/Psychiatric: The patient's general behavior, level of consciousness, thought content and emotional status is normal.        Medications:  Scheduled Medications:    enoxaparin  30 mg SubCUTAneous BID    [START ON 2/2/2022] furosemide  80 mg Oral Daily    [START ON 2/2/2022] spironolactone  25 mg Oral Once    aspirin  324 mg Oral Once    enalapril  10 mg Oral Daily    carvedilol  12.5 mg Oral BID WC    sodium chloride flush  5-40 mL IntraVENous 2 times per day    pantoprazole  40 mg IntraVENous Daily    And    sodium chloride (PF)  10 mL IntraVENous Daily    bismuth subsalicylate  594 mg Oral 4x Daily AC & HS    thiamine  100 mg Oral Daily    folic acid  1 mg Oral Daily    therapeutic multivitamin-minerals  1 tablet Oral Daily     Continuous Infusions:    sodium chloride 25 mL (01/31/22 2233)     PRN Medicationslabetalol, 10 mg, Q4H PRN  sodium chloride flush, 5-40 mL, PRN  sodium chloride, 25 mL, PRN  ondansetron, 4 mg, Q8H PRN   Or  ondansetron, 4 mg, Q6H PRN  polyethylene glycol, 17 g, Daily PRN  acetaminophen, 650 mg, Q6H PRN   Or  acetaminophen, 650 mg, Q6H PRN  potassium chloride, 40 mEq, PRN   Or  potassium alternative oral replacement, 40 mEq, PRN   Or  potassium chloride, 10 mEq, PRN  fentanNYL, 25 mcg, Q2H PRN        Diagnostic Labs:  CBC:   Recent Labs     01/31/22  1302 02/01/22  0919   WBC 8.7 10.6   RBC 5.94* 6.14*   HGB 16.0 16.8   HCT 49.4 50.9*   MCV 83.2 82.9   RDW 14.6* 15.0*    290     BMP:   Recent Labs     01/31/22  1302 02/01/22  0919    132*   K 4.0 3.7    94*   CO2 19* 21   BUN 6 7   CREATININE 0.71 0.94     BNP: No results for input(s): BNP in the last 72 hours. PT/INR: No results for input(s): PROTIME, INR in the last 72 hours. APTT:   Recent Labs     01/31/22  1849   APTT 27.2     CARDIAC ENZYMES: No results for input(s): CKMB, CKMBINDEX, TROPONINI in the last 72 hours. Invalid input(s): CKTOTAL;3  FASTING LIPID PANEL:  Lab Results   Component Value Date    CHOL 179 03/03/2021    HDL 44 03/03/2021    TRIG 166 (H) 03/03/2021     LIVER PROFILE:   Recent Labs     01/31/22  1302   AST 18   ALT 22   BILITOT 0.39   ALKPHOS 109      MICROBIOLOGY: No results found for: CULTURE    Imaging:    XR CHEST PORTABLE    Result Date: 1/31/2022  No acute abnormality. ASSESSMENT & PLAN     IMPRESSION  This is a 37 y.o. male who presented with chest pain, abdominal pain and shortness of breath and found to have elevated BP. Patient admitted to inpatient status for the management of hypertensive urgency and exacerbation of heart failure.     Hypertensive urgency likely secondary to non-compliance to meds: Resolved  -Nitro drip is discontinued. Resume lisinopril 10 mg once daily and Coreg 12.5 mg twice daily.       Chest pain with elevated troponins, EKG normal  -Heparin drip is discontinued. Most likely type II. Troponin remained flat. Pain meds as needed.      Epigastric abdominal pain with tenderness  -Follow-up on lipase. Continue IV Protonix daily.     Dilated NICM with EF of 10 to 15%  -Heart failure medications are readjusted as per cardiology recommendation. Started on p.o. Lasix 80 mg once daily with addition of Aldactone 25 mg once daily. Also on Coreg and lisinopril.  -Fluid restriction 1500 to 2000 mL once daily. Also salt restriction to less than 2000 mg.  -Will need outpatient follow-up with cardiology clinic and heart failure clinic. Echocardiogram on 3/3/20/2021  Summary  Left ventricle is moderately enlarged. Mild to moderate left ventricular  hypertrophy.   Global left ventricular systolic function is severely reduced with an  estimated ejection fraction of 20 % . Severe global hypokinesis. Left atrium is moderately dilated. Right atrium is mildly dilated . Right ventricle is normal in size with mildly reduced function. History of alcohol use  -Continue thiamine, folic acid. Monitor for withdrawals. -Recommend alcohol cessation as it can worsen NICM. Patient voiced understanding.     Prolonged QT  -Avoid QT prolonging medications. Replace K and Mg as needed.     Marijuana use  -Advised on cessation. Follow-up on urine tox.     Chronic everyday smoker  -advised on cessation     DVT ppx:  Lovenox  GI ppx: Protonix  Diet: adult regular diet  PT/OT: Consulted  Case Management: Consulted      Jailyn Aguilar MD  Internal Medicine Resident, PGY-1  [address], [place]  2/1/2022, 2:32 PM

## 2022-02-01 NOTE — PROGRESS NOTES
[facility]  Documentation Note                Admission Dx: [facility] [I21.4]  Hypertensive urgency [I16.0]  Chest pain, unspecified type [R07.9]    Past Medical History:   has a past medical history of CHF (congestive heart failure) ([facility] [address] Hyperlipidemia, Hypertension, and Testicular torsion. Previous Testing:     CATH 3/4/2021: Done by Dr. Jace Bernstein    LMCA: Normal 0% stenosis. LAD: Mild irregularities 10-20%. LCx: Mild irregularities 20-30%. RCA: Mild irregularities 10-20%. SEVERE LV SYSTOLIC DYSFUNCTION WITH LVEF OF 15%   DILATED AORTIC ROOT, AND SINUSES OF VALSALVA NOTED. MILD PULMONARY HTN NOTED ON RIGHT HEART CATH. ECHO 3/3/2021: EF 20%, severe global hypokinesis, trivial AI, mild MR/TR, RVSP is 30 mmHg, small PCE, aortic root is moderately dilated. Previous office/hospital visit:   Dr. Jace Bernstein 3/5/2021:   1. Nonischemic dilated cardiomyopathy. 2. History of alcohol abuse disorder. 3. Essential hypertension. 4. Morbid obesity. Plan --   1. Stop IV furosemide, switch to oral furosemide 80 mg every morning, add spironolactone 25 mg daily for dilated cardiomyopathy. Stop hydrochlorothiazide. 2. Continue carvedilol and lisinopril for afterload reduction therapy  3. Patient may be discharged home in a we will arrange for outpatient cardiology follow-up.     Soumya Rivas, [facility]

## 2022-02-01 NOTE — PROGRESS NOTES
Physical Therapy        Physical Therapy Cancel Note      DATE: 2022    NAME: Dalton Thibodeaux  MRN: [id number]   : 1980      Patient not seen this date for Physical Therapy due to:    Patient is not appropriate for PT evaluation/treatment at this time d/t pt complains of chest pain, awaiting cardiology consult.       Electronically signed by Halima Ochoa PT on 2022 at 8:55 AM

## 2022-02-01 NOTE — PROGRESS NOTES
Occupational [address]  Occupational Therapy Not Seen Note    DATE: 2022    NAME: Redd Santoro  MRN: [id number]   : 1980      Patient not seen this date for Occupational Therapy due to: Other: Cardiology consult placed this AM \"evaluation for life vest vs AICD\" as pt with chest pain and EF of 15%. Will await cardiology consult and POC.        Next Scheduled Treatment: Will check back 2022    Electronically signed by Stephani Marley OT on 2022 at 12:14 PM

## 2022-02-01 NOTE — PROGRESS NOTES
Congestive Heart Failure Education completed and charted. CHF booklet given. Patient was receptive to education. Discussed the  importance of medication compliance. Discussed the importance of a heart healthy diet. Discussed 2000 mg sodium-restricted daily diet. Patient instructed to limit fluid intake to  1.5 to 2 liters per day. Patient instructed to weigh self at the same time of each day each morning, reinforced teaching to monitor for 3-5 lb weight increase over 1-2 days notify physician if change noted. Signs and symptoms of CHF discussed with patient, such as feeling more tired than normal, feeling short of breath, coughing that increases when lying down, sudden weight gain, swelling of the feet, legs or belly. Patient verbalized understanding to notify physician office if these symptoms occur. EF 20%   Pt is agreeable to an outpatient CHF Clinic referral . Referral placed.

## 2022-02-01 NOTE — CARE COORDINATION
Case Management Initial Discharge Plan  Dane Melton,             Met with:patient to discuss discharge plans. Information verified: address, contacts, phone number, , insurance Yes [address] [facility] [address] Provider: Zaria Stephens    Emergency Contact/Next of Breana 70 name & number: father Agustina Pride [phone]  Who are involved in patient's support system? parents    PCP: Judy Muñoz MD  Date of last visit: year ago      Discharge Planning    Living Arrangements:  Alone     Home-lower duplex  3 stairs to climb to get into front door    Patient able to perform ADL's:Independent    Current Services (outpatient & in home) n/a  DME equipment: none  DME provider:     Is patient receiving oral anticoagulation therapy? No      Potential Assistance Needed:  N/A    Patient agreeable to home care: no  Sigel of choice provided:  n/a    Prior SNF/Rehab Placement and Facility: N/A  Agreeable to SNF/Rehab: No  Sigel of choice provided: n/a     Evaluation: n/a    Expected Discharge date:  22    Patient expects to be discharged to:    home  If home: is the family and/or caregiver wiling & able to provide support at home? yes  Who will be providing this support? parents*    Follow Up Appointment: Best Day/ Time:     Transportation provider: father  Transportation arrangements needed for discharge: No    Readmission Risk              Risk of Unplanned Readmission:  19             Does patient have a readmission risk score greater than 14?: Yes  If yes, follow-up appointment must be made within 7 days of discharge.      Goals of Care: no chest pain      Educated patient on transitional options, provided freedom of choice and are agreeable with plan      Discharge Plan: home alone with parents [address] on  and [address]          Electronically signed by Clarissa Scott RN on 22 at 12:00 PM EST

## 2022-02-02 ENCOUNTER — APPOINTMENT (OUTPATIENT)
Dept: GENERAL RADIOLOGY | Age: 42
DRG: 280 | End: 2022-02-02
Payer: COMMERCIAL

## 2022-02-02 PROBLEM — I50.23 ACUTE ON CHRONIC SYSTOLIC CONGESTIVE HEART FAILURE (HCC): Status: ACTIVE | Noted: 2021-03-05

## 2022-02-02 PROBLEM — N17.9 AKI (ACUTE KIDNEY INJURY) (HCC): Status: ACTIVE | Noted: 2022-02-02

## 2022-02-02 PROBLEM — I16.0 HYPERTENSIVE URGENCY: Status: RESOLVED | Noted: 2022-01-31 | Resolved: 2022-02-02

## 2022-02-02 LAB
ABSOLUTE EOS #: 0.14 K/UL (ref 0–0.44)
ABSOLUTE IMMATURE GRANULOCYTE: 0.03 K/UL (ref 0–0.3)
ABSOLUTE LYMPH #: 3.9 K/UL (ref 1.1–3.7)
ABSOLUTE MONO #: 0.75 K/UL (ref 0.1–1.2)
ANION GAP SERPL CALCULATED.3IONS-SCNC: 12 MMOL/L (ref 9–17)
ANION GAP SERPL CALCULATED.3IONS-SCNC: 13 MMOL/L (ref 9–17)
BASOPHILS # BLD: 0 % (ref 0–2)
BASOPHILS ABSOLUTE: 0.03 K/UL (ref 0–0.2)
BUN BLDV-MCNC: 17 MG/DL (ref 6–20)
BUN BLDV-MCNC: 19 MG/DL (ref 6–20)
BUN/CREAT BLD: ABNORMAL (ref 9–20)
BUN/CREAT BLD: ABNORMAL (ref 9–20)
CALCIUM SERPL-MCNC: 8.8 MG/DL (ref 8.6–10.4)
CALCIUM SERPL-MCNC: 9 MG/DL (ref 8.6–10.4)
CHLORIDE BLD-SCNC: 97 MMOL/L (ref 98–107)
CHLORIDE BLD-SCNC: 97 MMOL/L (ref 98–107)
CO2: 22 MMOL/L (ref 20–31)
CO2: 22 MMOL/L (ref 20–31)
CREAT SERPL-MCNC: 1.22 MG/DL (ref 0.7–1.2)
CREAT SERPL-MCNC: 1.34 MG/DL (ref 0.7–1.2)
DIFFERENTIAL TYPE: ABNORMAL
EOSINOPHILS RELATIVE PERCENT: 2 % (ref 1–4)
GFR AFRICAN AMERICAN: >60 ML/MIN
GFR AFRICAN AMERICAN: >60 ML/MIN
GFR NON-AFRICAN AMERICAN: 59 ML/MIN
GFR NON-AFRICAN AMERICAN: >60 ML/MIN
GFR SERPL CREATININE-BSD FRML MDRD: ABNORMAL ML/MIN/{1.73_M2}
GLUCOSE BLD-MCNC: 110 MG/DL (ref 70–99)
GLUCOSE BLD-MCNC: 121 MG/DL (ref 70–99)
HCT VFR BLD CALC: 48.6 % (ref 40.7–50.3)
HEMOGLOBIN: 15.8 G/DL (ref 13–17)
IMMATURE GRANULOCYTES: 0 %
LYMPHOCYTES # BLD: 49 % (ref 24–43)
MCH RBC QN AUTO: 26.8 PG (ref 25.2–33.5)
MCHC RBC AUTO-ENTMCNC: 32.5 G/DL (ref 28.4–34.8)
MCV RBC AUTO: 82.5 FL (ref 82.6–102.9)
MONOCYTES # BLD: 10 % (ref 3–12)
NRBC AUTOMATED: 0 PER 100 WBC
PDW BLD-RTO: 14.7 % (ref 11.8–14.4)
PLATELET # BLD: 254 K/UL (ref 138–453)
PLATELET ESTIMATE: ABNORMAL
PMV BLD AUTO: 10.3 FL (ref 8.1–13.5)
POTASSIUM SERPL-SCNC: 3.8 MMOL/L (ref 3.7–5.3)
POTASSIUM SERPL-SCNC: 3.9 MMOL/L (ref 3.7–5.3)
RBC # BLD: 5.89 M/UL (ref 4.21–5.77)
RBC # BLD: ABNORMAL 10*6/UL
SEG NEUTROPHILS: 39 % (ref 36–65)
SEGMENTED NEUTROPHILS ABSOLUTE COUNT: 3.04 K/UL (ref 1.5–8.1)
SODIUM BLD-SCNC: 131 MMOL/L (ref 135–144)
SODIUM BLD-SCNC: 132 MMOL/L (ref 135–144)
WBC # BLD: 7.9 K/UL (ref 3.5–11.3)
WBC # BLD: ABNORMAL 10*3/UL

## 2022-02-02 PROCEDURE — 6370000000 HC RX 637 (ALT 250 FOR IP): Performed by: STUDENT IN AN ORGANIZED HEALTH CARE EDUCATION/TRAINING PROGRAM

## 2022-02-02 PROCEDURE — 99233 SBSQ HOSP IP/OBS HIGH 50: CPT | Performed by: INTERNAL MEDICINE

## 2022-02-02 PROCEDURE — 36415 COLL VENOUS BLD VENIPUNCTURE: CPT

## 2022-02-02 PROCEDURE — 2580000003 HC RX 258

## 2022-02-02 PROCEDURE — 6360000002 HC RX W HCPCS

## 2022-02-02 PROCEDURE — C9113 INJ PANTOPRAZOLE SODIUM, VIA: HCPCS

## 2022-02-02 PROCEDURE — 6370000000 HC RX 637 (ALT 250 FOR IP)

## 2022-02-02 PROCEDURE — 85025 COMPLETE CBC W/AUTO DIFF WBC: CPT

## 2022-02-02 PROCEDURE — 2060000000 HC ICU INTERMEDIATE R&B

## 2022-02-02 PROCEDURE — 80048 BASIC METABOLIC PNL TOTAL CA: CPT

## 2022-02-02 PROCEDURE — 71045 X-RAY EXAM CHEST 1 VIEW: CPT

## 2022-02-02 RX ORDER — ASPIRIN 81 MG/1
81 TABLET ORAL DAILY
Qty: 90 TABLET | Refills: 0 | Status: SHIPPED | OUTPATIENT
Start: 2022-02-02

## 2022-02-02 RX ORDER — FUROSEMIDE 80 MG
40 TABLET ORAL 2 TIMES DAILY
Qty: 60 TABLET | Refills: 1 | Status: CANCELLED | OUTPATIENT
Start: 2022-02-02

## 2022-02-02 RX ADMIN — BISMUTH SUBSALICYLATE 524 MG: 262 TABLET, CHEWABLE ORAL at 22:55

## 2022-02-02 RX ADMIN — SODIUM CHLORIDE, PRESERVATIVE FREE 10 ML: 5 INJECTION INTRAVENOUS at 08:14

## 2022-02-02 RX ADMIN — FOLIC ACID 1 MG: 1 TABLET ORAL at 08:14

## 2022-02-02 RX ADMIN — BISMUTH SUBSALICYLATE 524 MG: 262 TABLET, CHEWABLE ORAL at 12:06

## 2022-02-02 RX ADMIN — CARVEDILOL 12.5 MG: 12.5 TABLET, FILM COATED ORAL at 08:14

## 2022-02-02 RX ADMIN — CARVEDILOL 12.5 MG: 12.5 TABLET, FILM COATED ORAL at 18:08

## 2022-02-02 RX ADMIN — Medication 1 TABLET: at 08:14

## 2022-02-02 RX ADMIN — ENALAPRIL MALEATE 10 MG: 10 TABLET ORAL at 08:14

## 2022-02-02 RX ADMIN — BISMUTH SUBSALICYLATE 524 MG: 262 TABLET, CHEWABLE ORAL at 18:08

## 2022-02-02 RX ADMIN — SODIUM CHLORIDE, PRESERVATIVE FREE 10 ML: 5 INJECTION INTRAVENOUS at 10:21

## 2022-02-02 RX ADMIN — FUROSEMIDE 80 MG: 40 TABLET ORAL at 08:14

## 2022-02-02 RX ADMIN — Medication 100 MG: at 08:14

## 2022-02-02 RX ADMIN — PANTOPRAZOLE SODIUM 40 MG: 40 INJECTION, POWDER, LYOPHILIZED, FOR SOLUTION INTRAVENOUS at 08:14

## 2022-02-02 RX ADMIN — BISMUTH SUBSALICYLATE 524 MG: 262 TABLET, CHEWABLE ORAL at 06:29

## 2022-02-02 ASSESSMENT — PAIN SCALES - GENERAL
PAINLEVEL_OUTOF10: 0
PAINLEVEL_OUTOF10: 0

## 2022-02-02 NOTE — PROGRESS NOTES
Dmitri Green  Occupational Therapy Not Seen Note    DATE: 2022    NAME: Brandon Ramirez  MRN: [id number]   : 1980      Patient not seen this date for Occupational Therapy due to:    Patient independent with ADLs and functional tasks with no acute OT needs. Pt observed independently ambulating around the hallways. Pt reports no safety concerns for returning home at discharge. Will defer OT evaluation at this time. Please reorder OT if future needs arise.        Electronically signed by Wood Nava OT on 2022 at 12:53 PM

## 2022-02-02 NOTE — PROGRESS NOTES
Physician Progress Note      Samina Montanez  CSN #:                  [id number]  :                       1980  ADMIT DATE:       2022 12:28 PM  [address] [place] DATE:  RESPONDING  PROVIDER #:        Annmarie Akbar MD          QUERY TEXT:    Pt admitted with Hypertensive urgency  and has CHF documented. If possible,   please document in progress notes and discharge summary further specificity   regarding the type and acuity of CHF:    The medical record reflects the following:  Risk Factors: HTN, NICM, Morbid obesity  Clinical Indicators: Pro-BNP 1,068,CO2 19 ,Troponin 24>37>37, per H&P -   Patient has a past medical history of an ICM with EF of 10 to 15%. ,  Treatment:  Lasix and hydrochlorothiazide, monitoring    Thank you, Please call if questions  Paloma Recinos RN [facility]  [phone]  Options provided:  -- Acute on Chronic Systolic CHF/HFrEF  -- Acute on Chronic Diastolic CHF/HFpEF  -- Acute on Chronic Systolic and Diastolic CHF  -- Chronic Systolic CHF/HFrEF  -- Chronic Diastolic CHF/HFpEF  -- Chronic Systolic and Diastolic CHF  -- Other - I will add my own diagnosis  -- Disagree - Not applicable / Not valid  -- Disagree - Clinically unable to determine / Unknown  -- Refer to Clinical Documentation Reviewer    PROVIDER RESPONSE TEXT:    This patient is in acute on chronic systolic CHF/HFrEF.     Query created by: Vicenta Jeter on 2022 8:55 AM      Electronically signed by:  Annmarie Akbar MD 2022 3:52 PM

## 2022-02-02 NOTE — CARE COORDINATION
Order for Life Vest. Call from Swathi Gilmore at [facility] progress note cosigned by Dr. Ainsley Palencia. PS Dr. Selin Vasquez. Call from Swathi Gilmore with [facility] an echo or cath within the last 6 months-PS. Dr Raimundo Rubi ordered an echo.

## 2022-02-02 NOTE — PROGRESS NOTES
Physical Therapy        Physical Therapy Cancel Note      DATE: 2022    NAME: Rajinder Grande  MRN: [id number]   : 1980      Patient not seen this date for Physical Therapy due to:    Patient independent with functional mobility. Will defer PT evaluation at this time. Please reorder PT if future needs arise. Discussed with pt and RN, both verbalized agreement. Pt denies concerns. Educated pt on requesting PT if needs change this admission.       Electronically signed by Brian Barton PT on 2022 at 2:45 PM

## 2022-02-02 NOTE — PLAN OF CARE
Problem: Pain:  Goal: Pain level will decrease  Description: Pain level will decrease  2/2/2022 1123 by America Shetty RN  Outcome: Completed  2/2/2022 0435 by Tye Lennox, RN  Outcome: Ongoing  2/2/2022 0435 by Tye Lennox, RN  Outcome: Ongoing  Goal: Control of acute pain  Description: Control of acute pain  2/2/2022 1123 by America Shetty RN  Outcome: Completed  2/2/2022 0435 by Tye Lennox, RN  Outcome: Ongoing  2/2/2022 0435 by Tye Lennox, RN  Outcome: Ongoing  Goal: Control of chronic pain  Description: Control of chronic pain  2/2/2022 1123 by America Shetty RN  Outcome: Completed  2/2/2022 0435 by Tye Lennox, RN  Outcome: Ongoing  2/2/2022 0435 by Tye Lennox, RN  Outcome: Ongoing  Goal: Patient's pain/discomfort is manageable  Description: Patient's pain/discomfort is manageable  2/2/2022 1123 by America Shetty RN  Outcome: Completed  2/2/2022 0435 by Tye Lennox, RN  Outcome: Ongoing  2/2/2022 0435 by Tye Lennox, RN  Outcome: Ongoing     Problem: Infection:  Goal: Will remain free from infection  Description: Will remain free from infection  2/2/2022 1123 by America Shetty RN  Outcome: Completed  2/2/2022 0435 by Tye Lennox, RN  Outcome: Ongoing  2/2/2022 0435 by Tye Lennox, RN  Outcome: Ongoing     Problem: Safety:  Goal: Free from accidental physical injury  Description: Free from accidental physical injury  2/2/2022 1123 by America Shetty RN  Outcome: Completed  2/2/2022 0435 by Tye Lennox, RN  Outcome: Ongoing  2/2/2022 0435 by Tye Lennox, RN  Outcome: Ongoing  Goal: Free from intentional harm  Description: Free from intentional harm  2/2/2022 1123 by America Shetty RN  Outcome: Completed  2/2/2022 0435 by Tye Lennox, RN  Outcome: Ongoing  2/2/2022 0435 by Tye Lennox, RN  Outcome: Ongoing     Problem: Daily Care:  Goal: Daily care needs are met  Description: Daily care needs are met  2/2/2022 1123 by America Shetty RN  Outcome: Completed  2/2/2022 0435 by Abdulkadir Grady Gill RN  Outcome: Ongoing  2/2/2022 0435 by Avery Cuello RN  Outcome: Ongoing     Problem: Skin Integrity:  Goal: Skin integrity will stabilize  Description: Skin integrity will stabilize  2/2/2022 1123 by Jorge Mckenzie RN  Outcome: Completed  2/2/2022 0435 by Avery Cuello RN  Outcome: Ongoing  2/2/2022 0435 by Avery Cuello RN  Outcome: Ongoing     Problem: Discharge Planning:  Goal: Patients continuum of care needs are met  Description: Patients continuum of care needs are met  2/2/2022 1123 by Jorge Mckenzie RN  Outcome: Completed  2/2/2022 0435 by Avery Cuello RN  Outcome: Ongoing  2/2/2022 0435 by Avery Cuello RN  Outcome: Ongoing     Problem: OXYGENATION/RESPIRATORY FUNCTION  Goal: Patient will maintain patent airway  2/2/2022 1123 by Jorge Mkcenzie RN  Outcome: Completed  2/2/2022 0435 by Avery Cuello RN  Outcome: Ongoing  2/2/2022 0435 by Avery Cuello RN  Outcome: Ongoing  Goal: Patient will achieve/maintain normal respiratory rate/effort  Description: Respiratory rate and effort will be within normal limits for the patient  2/2/2022 1123 by Jorge Mckenzie RN  Outcome: Completed  2/2/2022 0435 by Avery Cuello RN  Outcome: Ongoing  2/2/2022 0435 by Avery Cuello RN  Outcome: Ongoing     Problem: HEMODYNAMIC STATUS  Goal: Patient has stable vital signs and fluid balance  2/2/2022 1123 by Jorge Mckenzie RN  Outcome: Completed  2/2/2022 0435 by Avery Cuello RN  Outcome: Ongoing  2/2/2022 0435 by Avery Cuello RN  Outcome: Ongoing     Problem: FLUID AND ELECTROLYTE IMBALANCE  Goal: Fluid and electrolyte balance are achieved/maintained  2/2/2022 1123 by Jorge Mckenzie RN  Outcome: Completed  2/2/2022 0435 by Avery Cuello RN  Outcome: Ongoing  2/2/2022 0435 by Avery Cuello RN  Outcome: Ongoing     Problem: ACTIVITY INTOLERANCE/IMPAIRED MOBILITY  Goal: Mobility/activity is maintained at optimum level for patient  2/2/2022 1123 by Jorge Mckenzie RN  Outcome: Completed  2/2/2022 0435 by Ana Rosa Christiansen RN  Outcome: Ongoing  2/2/2022 0435 by Ana Rosa Christiansen RN  Outcome: Ongoing

## 2022-02-02 NOTE — PLAN OF CARE
Problem: Pain:  Description: Pain management should include both nonpharmacologic and pharmacologic interventions.   Goal: Pain level will decrease  Description: Pain level will decrease  2/2/2022 0435 by Taran Goodman RN  Outcome: Ongoing  2/2/2022 0435 by Taran Goodman RN  Outcome: Ongoing  2/1/2022 1816 by Lita Mullins RN  Outcome: Ongoing  Goal: Control of acute pain  Description: Control of acute pain  2/2/2022 0435 by Taran Goodman RN  Outcome: Ongoing  2/2/2022 0435 by Taran Goodman RN  Outcome: Ongoing  2/1/2022 1816 by Lita Mullins RN  Outcome: Ongoing  Goal: Control of chronic pain  Description: Control of chronic pain  2/2/2022 0435 by Taran Goodman RN  Outcome: Ongoing  2/2/2022 0435 by Taran Goodman RN  Outcome: Ongoing  2/1/2022 1816 by Lita Mullins RN  Outcome: Ongoing  Goal: Patient's pain/discomfort is manageable  Description: Patient's pain/discomfort is manageable  2/2/2022 0435 by Taran Goodman RN  Outcome: Ongoing  2/2/2022 0435 by Taran Goodman RN  Outcome: Ongoing  2/1/2022 1816 by Lita Mullins RN  Outcome: Ongoing     Problem: Infection:  Goal: Will remain free from infection  Description: Will remain free from infection  2/2/2022 0435 by Taran Goodman RN  Outcome: Ongoing  2/2/2022 0435 by Taran Goodman RN  Outcome: Ongoing  2/1/2022 1816 by Lita Mullins RN  Outcome: Ongoing     Problem: Safety:  Goal: Free from accidental physical injury  Description: Free from accidental physical injury  2/2/2022 0435 by Taran Goodman RN  Outcome: Ongoing  2/2/2022 0435 by Taran Goodman RN  Outcome: Ongoing  2/1/2022 1816 by Lita Mullins RN  Outcome: Ongoing  Goal: Free from intentional harm  Description: Free from intentional harm  2/2/2022 0435 by Taran Goodman RN  Outcome: Ongoing  2/2/2022 0435 by Taran Goodman RN  Outcome: Ongoing  2/1/2022 1816 by Lita Mullins RN  Outcome: Ongoing     Problem: Daily Care:  Goal: Daily care needs are met  Description: Daily care needs are patient  2/2/2022 0435 by Matt Nolasco RN  Outcome: Ongoing  2/2/2022 0435 by Matt Nolasco RN  Outcome: Ongoing  2/1/2022 1816 by Deepika Kelly RN  Outcome: Ongoing

## 2022-02-02 NOTE — PROGRESS NOTES
CLINICAL PHARMACY NOTE: MEDS TO BEDS    Total # of Prescriptions Filled: 1   The following medications were delivered to the patient:  · Aspirin 81mg tablets    Additional Documentation: medication delivered to the pt in room 2014 on 02.02.22 at 17:40, no co pay

## 2022-02-02 NOTE — CONSULTS
[facility]    Inpatient Consultation Note               Today's Date: 2/2/2022  Patient Name: Akanksha Gonzales  Date of admission: 1/31/2022 12:28 PM  Patient's age: 37 y.o., 1980  Admission Dx: [facility]non[facility] [I21.4]  Hypertensive urgency [I16.0]  Chest pain, unspecified type [R07.9]    Reason for  Consult:  Life vest vs AICD    Requesting Physician: Cele Messina MD    CHIEF COMPLAINT:     Chief Complaint   Patient presents with    Chest Pain       History Obtained From:  Patient, Electronic medical record. HISTORY OF PRESENT ILLNESS:      The patient is a 37 y.o. male who is admitted to the hospital for hypertensive urgency. He was initially started on nitro GTT and then was issue and in his oral antihypertensive medication. He has history of nonischemic cardiomyopathy,  History of alcohol abuse and essential hypertension. He follow-up with Dr. Cait Dutton from [facility]. He presented to the hospital with shortness of breath. Denies any CP. No nausea or vomiting. No diaphoresis. No syncope. Past Medical History:   has a past medical history of CHF (congestive heart failure) ([facility] Hyperlipidemia, Hypertension, and Testicular torsion. Past Surgical History:   has a past surgical history that includes Total shoulder arthroplasty (Left, 1995); Knee arthroscopy (Left, 2009); and Testicle removal (Left, 2012). Home Medications:    Prior to Admission medications    Medication Sig Start Date End Date Taking?  Authorizing Provider   enalapril (VASOTEC) 10 MG tablet Take 1 tablet by mouth daily 9/15/21   Elinor Hatfield MD   carvedilol (COREG) 12.5 MG tablet Take 1 tablet by mouth 2 times daily (with meals) 9/15/21   Elinor Hatfield MD   hydroCHLOROthiazide (HYDRODIURIL) 25 MG tablet Take 1 tablet by mouth daily 9/15/21   Elinor Hatfield MD   atorvastatin (LIPITOR) 40 MG tablet Take 1 tablet by mouth nightly 3/5/21 Nury Vigil MD   furosemide (LASIX) 80 MG tablet Take 1 tablet by mouth daily 3/5/21   Nury Vigil MD   potassium chloride (KLOR-CON M) 10 MEQ extended release tablet Take 1 tablet by mouth daily 3/5/21   Nury Vigil MD        Current Facility-Administered Medications: labetalol (NORMODYNE;TRANDATE) injection 10 mg, 10 mg, IntraVENous, Q4H PRN  enoxaparin (LOVENOX) injection 30 mg, 30 mg, SubCUTAneous, BID  furosemide (LASIX) tablet 80 mg, 80 mg, Oral, Daily  aspirin chewable tablet 324 mg, 324 mg, Oral, Once  enalapril (VASOTEC) tablet 10 mg, 10 mg, Oral, Daily  carvedilol (COREG) tablet 12.5 mg, 12.5 mg, Oral, BID WC  sodium chloride flush 0.9 % injection 5-40 mL, 5-40 mL, IntraVENous, 2 times per day  sodium chloride flush 0.9 % injection 5-40 mL, 5-40 mL, IntraVENous, PRN  0.9 % sodium chloride infusion, 25 mL, IntraVENous, PRN  ondansetron (ZOFRAN-ODT) disintegrating tablet 4 mg, 4 mg, Oral, Q8H PRN **OR** ondansetron (ZOFRAN) injection 4 mg, 4 mg, IntraVENous, Q6H PRN  polyethylene glycol (GLYCOLAX) packet 17 g, 17 g, Oral, Daily PRN  acetaminophen (TYLENOL) tablet 650 mg, 650 mg, Oral, Q6H PRN **OR** acetaminophen (TYLENOL) suppository 650 mg, 650 mg, Rectal, Q6H PRN  potassium chloride (KLOR-CON M) extended release tablet 40 mEq, 40 mEq, Oral, PRN **OR** potassium bicarb-citric acid (EFFER-K) effervescent tablet 40 mEq, 40 mEq, Oral, PRN **OR** potassium chloride 10 mEq/100 mL IVPB (Peripheral Line), 10 mEq, IntraVENous, PRN  pantoprazole (PROTONIX) injection 40 mg, 40 mg, IntraVENous, Daily **AND** sodium chloride (PF) 0.9 % injection 10 mL, 10 mL, IntraVENous, Daily  bismuth subsalicylate (PEPTO BISMOL) chewable tablet 524 mg, 524 mg, Oral, 4x Daily AC & HS  thiamine tablet 100 mg, 100 mg, Oral, Daily  folic acid (FOLVITE) tablet 1 mg, 1 mg, Oral, Daily  therapeutic multivitamin-minerals 1 tablet, 1 tablet, Oral, Daily  fentaNYL (SUBLIMAZE) injection 25 mcg, 25 mcg, IntraVENous, Q2H PRN    Allergies:  Patient has no known allergies. Social History:   reports that he has been smoking cigarettes. He has a 9.00 pack-year smoking history. He has never used smokeless tobacco. He reports current alcohol use of about 2.0 standard drinks of alcohol per week. He reports current drug use. Drug: Marijuana Ajit Meckel). Family History: family history includes Diabetes in his maternal grandmother. REVIEW OF SYSTEMS:      Constitutional: there has been no unanticipated weight loss. Eyes: No visual changes   ENT: No Headaches  Cardiovascular:  Remaining as above  Respiratory: No cough  Gastrointestinal: No abdominal pain. No change in bowel or bladder habits. Genitourinary: No dysuria, trouble voiding, or hematuria. Musculoskeletal: No joint complaints. Neurological: No headache  Hematologic/Lymphatic: No abnormal bruising or bleeding      PHYSICAL EXAM:      /89   Pulse 83   Temp 97 °F (36.1 °C) (Axillary)   Resp 21   Ht 6' 4\" (1.93 m)   Wt 265 lb (120.2 kg)   SpO2 95%   BMI 32.26 kg/m²    No intake or output data in the 24 hours ending 02/02/22 1054      Constitutional and General Appearance:   Alert, cooperative, no distress and appears stated age  Respiratory:  No for increased work of breathing. On auscultation: clear to auscultation bilaterally  Cardiovascular:  Regular S1 and S2. No murmurs  Abdomen:   No masses or tenderness  Bowel sounds present  Extremities:   No Cyanosis or Clubbing   Lower extremity edema: No  Neurological:  Alert and oriented. Moves all extremities well    DATA:    Diagnostics:      CATH 3/4/2021: Done by Dr. Sherryl Goldberg: Normal 0% stenosis.  LAD: Mild irregularities 10-20%.  LCx: Mild irregularities 20-30%.    RCA: Mild irregularities 10-20%.   SEVERE LV SYSTOLIC DYSFUNCTION WITH LVEF OF 15%   DILATED AORTIC ROOT, AND SINUSES OF VALSALVA NOTED.   MILD PULMONARY HTN NOTED ON RIGHT HEART CATH.     ECHO 3/3/2021: EF 20%, severe global hypokinesis, trivial AI, mild MR/TR, RVSP is 30 mmHg, small PCE, aortic root is moderately dilated.        Labs:     CBC:   Recent Labs     02/01/22  0919 02/02/22  0515   WBC 10.6 7.9   HGB 16.8 15.8   HCT 50.9* 48.6    254     BMP:   Recent Labs     02/01/22  0919 02/02/22  0515   * 131*   K 3.7 3.9   CO2 21 22   BUN 7 19   CREATININE 0.94 1.34*   LABGLOM >60 59*   GLUCOSE 132* 110*     Pro-BNP:    Recent Labs     01/31/22  1302   PROBNP 1,068*       Recent Labs     01/31/22  1849   APTT 27.2     Recent Labs     01/31/22  1302 01/31/22  1516 02/01/22  0043   TROPONINT NOT REPORTED NOT REPORTED NOT REPORTED       FASTING LIPID PANEL:  Lab Results   Component Value Date    HDL 44 03/03/2021    TRIG 166 03/03/2021     LIVER PROFILE:  Recent Labs     01/31/22  1302   AST 18   ALT 22   LABALBU 4.1         Patient's Active Problem List  Principal Problem:    Acute on chronic systolic congestive heart failure (HCC)  Active Problems:    CHAITANYA (acute kidney injury) ([facility]    Essential hypertension    Current every day smoker    Marijuana smoker    Elevated troponin    Class 2 obesity due to excess calories without serious comorbidity with body mass index (BMI) of 35.0 to 35.9 in adult    Prolonged Q-T interval on ECG    Multiple pulmonary nodules    Hypertensive urgency    Alcohol abuse    Epigastric pain    Elevated brain natriuretic peptide (BNP) level    Chest pain  Resolved Problems:    * No resolved hospital problems. *        IMPRESSION:      Acute on chronic systolic heart failure  Essential hypertension  Hypertensive urgency. Resolved   CHAITANYA   Minimal troponin elevation   Hyponatremia     RECOMMENDATIONS:    Agree with diuretics  Patient on [address] on discharge  Will schedule AICD as outpatient     Thank you for allowing us to participate in the care of Mohan Rosa. If you have any questions or concerns, please do not hesitate to contact us. Discussed with patient and Nurse.     Beny Billy Hayes MD, M.D. Fellow, [address]        Please note that part of this chart were generated using voice recognition  dictation software. Although every effort was made to ensure the accuracy of this automated transcription, some errors in transcription may have occurred. Attestation signed by      Attending Physician Statement:    I have discussed the care of  Mohan Rosa , including pertinent history and exam findings, with the Cardiology fellow/resident. I have seen and examined the patient and the key elements of all parts of the encounter have been performed by me. I agree with the assessment, plan and orders as documented by the fellow/resident, after I modified exam findings and plan of treatments, and the final version is my approved version of the assessment.      Additional Comments:

## 2022-02-03 VITALS
WEIGHT: 265 LBS | TEMPERATURE: 98.6 F | BODY MASS INDEX: 32.27 KG/M2 | DIASTOLIC BLOOD PRESSURE: 85 MMHG | RESPIRATION RATE: 15 BRPM | SYSTOLIC BLOOD PRESSURE: 124 MMHG | OXYGEN SATURATION: 93 % | HEART RATE: 83 BPM | HEIGHT: 76 IN

## 2022-02-03 LAB
ABSOLUTE EOS #: 0.1 K/UL (ref 0–0.44)
ABSOLUTE IMMATURE GRANULOCYTE: <0.03 K/UL (ref 0–0.3)
ABSOLUTE LYMPH #: 3.09 K/UL (ref 1.1–3.7)
ABSOLUTE MONO #: 0.75 K/UL (ref 0.1–1.2)
ANION GAP SERPL CALCULATED.3IONS-SCNC: 12 MMOL/L (ref 9–17)
BASOPHILS # BLD: 0 % (ref 0–2)
BASOPHILS ABSOLUTE: <0.03 K/UL (ref 0–0.2)
BUN BLDV-MCNC: 18 MG/DL (ref 6–20)
BUN/CREAT BLD: ABNORMAL (ref 9–20)
CALCIUM SERPL-MCNC: 8.9 MG/DL (ref 8.6–10.4)
CHLORIDE BLD-SCNC: 101 MMOL/L (ref 98–107)
CO2: 23 MMOL/L (ref 20–31)
CREAT SERPL-MCNC: 0.86 MG/DL (ref 0.7–1.2)
DIFFERENTIAL TYPE: ABNORMAL
EOSINOPHILS RELATIVE PERCENT: 2 % (ref 1–4)
GFR AFRICAN AMERICAN: >60 ML/MIN
GFR NON-AFRICAN AMERICAN: >60 ML/MIN
GFR SERPL CREATININE-BSD FRML MDRD: ABNORMAL ML/MIN/{1.73_M2}
GFR SERPL CREATININE-BSD FRML MDRD: ABNORMAL ML/MIN/{1.73_M2}
GLUCOSE BLD-MCNC: 113 MG/DL (ref 70–99)
HCT VFR BLD CALC: 47 % (ref 40.7–50.3)
HEMOGLOBIN: 15.3 G/DL (ref 13–17)
IMMATURE GRANULOCYTES: 0 %
LYMPHOCYTES # BLD: 46 % (ref 24–43)
MCH RBC QN AUTO: 27.3 PG (ref 25.2–33.5)
MCHC RBC AUTO-ENTMCNC: 32.6 G/DL (ref 28.4–34.8)
MCV RBC AUTO: 83.9 FL (ref 82.6–102.9)
MONOCYTES # BLD: 11 % (ref 3–12)
NRBC AUTOMATED: 0 PER 100 WBC
PDW BLD-RTO: 14.6 % (ref 11.8–14.4)
PLATELET # BLD: 250 K/UL (ref 138–453)
PLATELET ESTIMATE: ABNORMAL
PMV BLD AUTO: 10.3 FL (ref 8.1–13.5)
POTASSIUM SERPL-SCNC: 3.8 MMOL/L (ref 3.7–5.3)
RBC # BLD: 5.6 M/UL (ref 4.21–5.77)
RBC # BLD: ABNORMAL 10*6/UL
SEG NEUTROPHILS: 41 % (ref 36–65)
SEGMENTED NEUTROPHILS ABSOLUTE COUNT: 2.75 K/UL (ref 1.5–8.1)
SODIUM BLD-SCNC: 136 MMOL/L (ref 135–144)
WBC # BLD: 6.7 K/UL (ref 3.5–11.3)
WBC # BLD: ABNORMAL 10*3/UL

## 2022-02-03 PROCEDURE — 80048 BASIC METABOLIC PNL TOTAL CA: CPT

## 2022-02-03 PROCEDURE — 93320 DOPPLER ECHO COMPLETE: CPT

## 2022-02-03 PROCEDURE — 99233 SBSQ HOSP IP/OBS HIGH 50: CPT | Performed by: INTERNAL MEDICINE

## 2022-02-03 PROCEDURE — 93325 DOPPLER ECHO COLOR FLOW MAPG: CPT

## 2022-02-03 PROCEDURE — 6370000000 HC RX 637 (ALT 250 FOR IP): Performed by: STUDENT IN AN ORGANIZED HEALTH CARE EDUCATION/TRAINING PROGRAM

## 2022-02-03 PROCEDURE — 36415 COLL VENOUS BLD VENIPUNCTURE: CPT

## 2022-02-03 PROCEDURE — 85025 COMPLETE CBC W/AUTO DIFF WBC: CPT

## 2022-02-03 PROCEDURE — 6360000002 HC RX W HCPCS: Performed by: STUDENT IN AN ORGANIZED HEALTH CARE EDUCATION/TRAINING PROGRAM

## 2022-02-03 PROCEDURE — 6360000002 HC RX W HCPCS

## 2022-02-03 PROCEDURE — 93308 TTE F-UP OR LMTD: CPT

## 2022-02-03 PROCEDURE — C9113 INJ PANTOPRAZOLE SODIUM, VIA: HCPCS

## 2022-02-03 PROCEDURE — 6370000000 HC RX 637 (ALT 250 FOR IP)

## 2022-02-03 RX ORDER — PANTOPRAZOLE SODIUM 40 MG/1
40 TABLET, DELAYED RELEASE ORAL DAILY
Status: DISCONTINUED | OUTPATIENT
Start: 2022-02-04 | End: 2022-02-03 | Stop reason: HOSPADM

## 2022-02-03 RX ADMIN — PANTOPRAZOLE SODIUM 40 MG: 40 INJECTION, POWDER, LYOPHILIZED, FOR SOLUTION INTRAVENOUS at 08:09

## 2022-02-03 RX ADMIN — Medication 1 TABLET: at 08:10

## 2022-02-03 RX ADMIN — CARVEDILOL 12.5 MG: 12.5 TABLET, FILM COATED ORAL at 08:09

## 2022-02-03 RX ADMIN — FOLIC ACID 1 MG: 1 TABLET ORAL at 08:10

## 2022-02-03 RX ADMIN — ENALAPRIL MALEATE 10 MG: 10 TABLET ORAL at 08:10

## 2022-02-03 RX ADMIN — Medication 100 MG: at 08:10

## 2022-02-03 RX ADMIN — FUROSEMIDE 80 MG: 40 TABLET ORAL at 08:10

## 2022-02-03 RX ADMIN — BISMUTH SUBSALICYLATE 524 MG: 262 TABLET, CHEWABLE ORAL at 11:32

## 2022-02-03 RX ADMIN — ENOXAPARIN SODIUM 30 MG: 100 INJECTION SUBCUTANEOUS at 08:10

## 2022-02-03 RX ADMIN — BISMUTH SUBSALICYLATE 524 MG: 262 TABLET, CHEWABLE ORAL at 08:09

## 2022-02-03 ASSESSMENT — PAIN SCALES - GENERAL: PAINLEVEL_OUTOF10: 0

## 2022-02-03 NOTE — PLAN OF CARE
Problem: HEMODYNAMIC STATUS  Goal: Patient has stable vital signs and fluid balance  2/3/2022 1014 by Isabel Randolph RN  Outcome: Ongoing  2/3/2022 0519 by Elisa Renteria RN  Outcome: Met This Shift  2/3/2022 0518 by Elisa Renteria RN  Outcome: Met This Shift

## 2022-02-03 NOTE — CARE COORDINATION
Called Echo Lab-states that Dr. Karon Chi is to read the Echo-he hasn't come to the hospital yet.  She will notify Dr. Karon Chi that it needs read for a Life Vest and discharge

## 2022-02-03 NOTE — PLAN OF CARE
Problem: HEMODYNAMIC STATUS  Goal: Patient has stable vital signs and fluid balance  2/3/2022 0519 by Kelle Loza RN  Outcome: Met This Shift

## 2022-02-03 NOTE — PROGRESS NOTES
[facility]  Internal Medicine Teaching Residency Program  Inpatient Daily Progress Note  ______________________________________________________________________________    Patient: Marlo Edward  YOB: 1980   ZLT:[id number]    Acct: [de-identified]     Room: 2014/2014-01  Admit date: 1/31/2022  Today's date: 02/03/22  Number of days in the hospital: 3    SUBJECTIVE   Admitting Diagnosis: Acute on chronic systolic congestive heart failure (HCC)  CC: Cough shortness of breath and orthopnea. Patient seen and examined at bedside. Chart and results reviewed. Blood pressure stable  Labs within normal limits  Awaiting life vest    ROS:  Constitutional: Negative for appetite change. Negative for fever. HENT: Negative for congestion. Eyes: Negative for photophobia and itching. Respiratory: Negative for apnea and shortness of breath. Cardiovascular: Negative for chest pain. Gastrointestinal: Negative for abdominal pain. Endocrine: Negative for polydipsia. Polyphagia: :   Genitourinary: Negative for dysuria. Musculoskeletal: Negative for back pain. Skin: Negative for color change. Allergic/Immunologic: Negative for immunocompromised state. Neurological: Negative for dizziness. Hematological: Negative for adenopathy. Psychiatric/Behavioral: Negative for agitation. BRIEF HISTORY     The patient is a 37 y.o. male who presented to the ED complaining of chest pain on the left side, epigastric abdominal pain and shortness of breath. Patient has a past medical history of an ICM with EF of 10 to 15%. Patient stated that he was lying comfortably on the bed when all of this started which woke him up. He describes the chest pain to be located in the center and left side, 6 out of 10, stabbing in nature and worse when taking a deep breath.   His abdominal pain is located in the epigastric region, he described it as colicky, associated with nausea but no vomiting or diarrhea. EMS was called, patient did receive sublingual nitro and spray 2 times on his way to the ED here. As per patient he was admitted to [facility] in September for similar complaint. Patient admits to not taking his antihypertensive medications this morning.     On arrival to the ED, patient was on nasal cannula, was removed and was saturating well on room air. BP was high 200s/130, heart rate 103, otherwise other vital signs stable. Patient was started on nitro drip for hypertensive emergency. Pertinent labs were ordered which showed elevated proBNP, uptrending troponins 24> 37. EKG showed normal sinus rhythm with left axis deviation. Patient was started on low-dose heparin. Chest x-ray showed no acute abnormality. Covid negative. Subsequently heparin was discontinued as troponins remain flat. Cardiology consult was placed. Recommend 80 mg of p.o. Lasix with addition of Aldactone 25 mg once daily. Will need outpatient follow-up with cardiology clinic. OBJECTIVE     Vital Signs:  /80   Pulse 88   Temp 98.6 °F (37 °C)   Resp 18   Ht 6' 4\" (1.93 m)   Wt 265 lb (120.2 kg)   SpO2 93%   BMI 32.26 kg/m²     Temp (24hrs), Av.5 °F (36.9 °C), Min:98 °F (36.7 °C), Max:98.6 °F (37 °C)    No intake/output data recorded. Physical Exam:  General:  AAO x 3, speaking in full sentences, no accessory muscle use. HEENT: Atraumatic, normocephalic, no throat congestion, moist mucosa. Eyes:   Pupils equal, round and reactive to light, EOMI. Neck:   Supple  Chest:   Clear on auscultation. no rales or ronchi  Cardiac:  S1 S2 RR, no gallops, murmur or rubs. Abdomen: Soft, non-tender, no masses or organomegaly, BS present. :   No suprapubic or flank tenderness. Neuro:  AAO x 3, No FND. SKIN:  No rashes, good skin turgor. Extremities:  No edema.     Medications:  Scheduled Medications:    enoxaparin  30 mg SubCUTAneous BID    furosemide  80 mg Oral Daily    aspirin  324 mg Oral Once    enalapril  10 mg Oral Daily    carvedilol  12.5 mg Oral BID WC    sodium chloride flush  5-40 mL IntraVENous 2 times per day    pantoprazole  40 mg IntraVENous Daily    And    sodium chloride (PF)  10 mL IntraVENous Daily    bismuth subsalicylate  431 mg Oral 4x Daily AC & HS    thiamine  100 mg Oral Daily    folic acid  1 mg Oral Daily    therapeutic multivitamin-minerals  1 tablet Oral Daily     Continuous Infusions:    sodium chloride 25 mL (01/31/22 7312)     PRN Medicationslabetalol, 10 mg, Q4H PRN  sodium chloride flush, 5-40 mL, PRN  sodium chloride, 25 mL, PRN  ondansetron, 4 mg, Q8H PRN   Or  ondansetron, 4 mg, Q6H PRN  polyethylene glycol, 17 g, Daily PRN  acetaminophen, 650 mg, Q6H PRN   Or  acetaminophen, 650 mg, Q6H PRN  potassium chloride, 40 mEq, PRN   Or  potassium alternative oral replacement, 40 mEq, PRN   Or  potassium chloride, 10 mEq, PRN  fentanNYL, 25 mcg, Q2H PRN        Diagnostic Labs:  CBC:   Recent Labs     02/01/22  0919 02/02/22  0515 02/03/22  0625   WBC 10.6 7.9 6.7   RBC 6.14* 5.89* 5.60   HGB 16.8 15.8 15.3   HCT 50.9* 48.6 47.0   MCV 82.9 82.5* 83.9   RDW 15.0* 14.7* 14.6*    254 250     BMP:   Recent Labs     02/02/22  0515 02/02/22  1548 02/03/22  0625   * 132* 136   K 3.9 3.8 3.8   CL 97* 97* 101   CO2 22 22 23   BUN 19 17 18   CREATININE 1.34* 1.22* 0.86     BNP: No results for input(s): BNP in the last 72 hours. PT/INR: No results for input(s): PROTIME, INR in the last 72 hours. APTT:   Recent Labs     01/31/22  1849   APTT 27.2     CARDIAC ENZYMES: No results for input(s): CKMB, CKMBINDEX, TROPONINI in the last 72 hours.     Invalid input(s): CKTOTAL;3  FASTING LIPID PANEL:  Lab Results   Component Value Date    CHOL 179 03/03/2021    HDL 44 03/03/2021    TRIG 166 (H) 03/03/2021     LIVER PROFILE:   Recent Labs     01/31/22  1302   AST 18   ALT 22   BILITOT 0.39   ALKPHOS 109      MICROBIOLOGY: No results found for: CULTURE    Imaging:    XR CHEST PORTABLE    Result Date: 1/31/2022  No acute abnormality. ASSESSMENT & PLAN     IMPRESSION  This is a 37 y.o. male who presented with chest pain, abdominal pain and shortness of breath and found to have elevated BP. Patient admitted to inpatient status for the management of hypertensive urgency and exacerbation of heart failure.     Hypertensive urgency likely secondary to non-compliance to meds: Resolved  -Nitro drip is discontinued. Continue lisinopril 10 mg once daily and Coreg 12.5 mg twice daily. Follow up with pcp     Chest pain with elevated troponins, EKG normal  - due to hypertensive emergency. - resolved  - repeat echo done   - cardio following- no need for ischemic workup at this point     Epigastric abdominal pain with tenderness  - resolved     Dilated NICM with EF of 10 to 15%  -Heart failure medications are readjusted as per cardiology recommendation. Started on p.o. Lasix 80 mg once daily with addition of Aldactone 25 mg once daily. Also on Coreg and lisinopril.  -Fluid restriction 1500 to 2000 mL once daily. Also salt restriction to less than 2000 mg.  -Will need outpatient follow-up at cardiology clinic for evaluation of AICD. Lifevest ordered  -CHF education ordered    Acute kidney injury   - cardiorenal- resolved  - outpatient BMP    history of alcohol use  -Continue thiamine, folic acid. Monitor for withdrawals. -Recommend alcohol cessation as it can worsen NICM. Patient voiced understanding.     Prolonged QT  -Avoid QT prolonging medications. Replace K and Mg as needed.     Marijuana use  -Advised on cessation.   Follow-up on urine tox.     Chronic everyday smoker  -advised on cessation     DVT ppx:  Lovenox  GI ppx: Protonix  Diet: adult regular diet  PT/OT: Consulted  Case Management: [facility]  Internal Medicine Resident  [facility]  2/3/2022 12:29 PM

## 2022-02-03 NOTE — PROGRESS NOTES
[facility]  Progress Note                   Date:   2/3/2022  Patient name: Aubrey Pennington  Date of admission:  1/31/2022 12:28 PM  MRN:   [id number]  YOB: 1980  PCP: Judy Muñoz MD    Reason for Admission: NSTEMI (non-ST elevated myocardial infarction) [facility]) [I21.4]  Hypertensive urgency [I16.0]  Chest pain, unspecified type [R07.9]    Subjective:       Clinical Changes /Abnormalities: no acute CV issues/concern overnight. Not discharged yesterday d/t need for echo prior to LV being fitted. Labs, vitals, & tele reviewed. Review of Systems    Medications:   Scheduled Meds:   enoxaparin  30 mg SubCUTAneous BID    furosemide  80 mg Oral Daily    aspirin  324 mg Oral Once    enalapril  10 mg Oral Daily    carvedilol  12.5 mg Oral BID WC    sodium chloride flush  5-40 mL IntraVENous 2 times per day    pantoprazole  40 mg IntraVENous Daily    And    sodium chloride (PF)  10 mL IntraVENous Daily    bismuth subsalicylate  774 mg Oral 4x Daily AC & HS    thiamine  100 mg Oral Daily    folic acid  1 mg Oral Daily    therapeutic multivitamin-minerals  1 tablet Oral Daily     Continuous Infusions:   sodium chloride 25 mL (01/31/22 2233)     CBC:   Recent Labs     02/01/22  0919 02/02/22  0515 02/03/22  0625   WBC 10.6 7.9 6.7   HGB 16.8 15.8 15.3    254 250     BMP:    Recent Labs     02/02/22  0515 02/02/22  1548 02/03/22  0625   * 132* 136   K 3.9 3.8 3.8   CL 97* 97* 101   CO2 22 22 23   BUN 19 17 18   CREATININE 1.34* 1.22* 0.86   GLUCOSE 110* 121* 113*     Hepatic:  Recent Labs     01/31/22  1302   AST 18   ALT 22   BILITOT 0.39   ALKPHOS 109     Troponin:   Recent Labs     01/31/22  1302 01/31/22  1516 02/01/22  0043   TROPHS 24* 37* 37*     BNP: No results for input(s): BNP in the last 72 hours. Lipids: No results for input(s): CHOL, HDL in the last 72 hours.     Invalid input(s): LDLCALCU  INR: No results for input(s): INR in the last 72 hours.    Objective:   Vitals: /85   Pulse 83   Temp 98.6 °F (37 °C) (Oral)   Resp 15   Ht 6' 4\" (1.93 m)   Wt 265 lb (120.2 kg)   SpO2 93%   BMI 32.26 kg/m²   General appearance: alert and cooperative with exam  HEENT: Head: Normocephalic, no lesions, without obvious abnormality. Neck:no JVD, trachea midline, no adenopathy  Lungs: Clear to auscultation  Heart: Regular rate and rhythm, s1/s2 auscultated, no murmurs  Abdomen: soft, non-tender, bowel sounds active  Extremities: no edema  Neurologic: not done    CATH 3/4/2021: Done by Dr. Shefali Chow: Normal 0% stenosis.  LAD: Mild irregularities 10-20%.  LCx: Mild irregularities 20-30%.  RCA: Mild irregularities 10-20%.   SEVERE LV SYSTOLIC DYSFUNCTION WITH LVEF OF 15%   DILATED AORTIC ROOT, AND SINUSES OF VALSALVA NOTED.   MILD PULMONARY HTN NOTED ON RIGHT HEART CATH.     ECHO 3/3/2021: EF 20%, severe global hypokinesis, trivial AI, mild MR/TR, RVSP is 30 mmHg, small PCE, aortic root is moderately dilated.      Previous office/hospital visit:   Dr. Aura Cárdenas 3/5/2021:   1. Nonischemic dilated cardiomyopathy. 2. History of alcohol abuse disorder. 3. Essential hypertension. 4. Morbid obesity. 2/3/2022 ECHO  CONCLUSIONS     Summary  Normal LV size . Severe global hypokinesis. Severely reduced LV systolic function with LVEF 25-30 %. Normal RV size and function. LA and RA appears normal in size. No obvious significant structural valvular abnormality noted. Mild AR and MR noted  Mild to moderately increased aortic root and ascending aorta dimension. 4.5  cm  No significant pericardial effusion noted. No obvious intra-cardiac mass noted.   IVC normal diameter and inspiratory variation indicating normal RA filling  pressure.     Signature  ----------------------------------------------------------------------------   Electronically signed by Giulia Beltran(Interpreting physician) on   02/03/2022 11:00

## 2022-02-04 ENCOUNTER — CARE COORDINATION (OUTPATIENT)
Dept: CASE MANAGEMENT | Age: 42
End: 2022-02-04

## 2022-02-04 DIAGNOSIS — I50.23 ACUTE ON CHRONIC SYSTOLIC CONGESTIVE HEART FAILURE (HCC): Primary | ICD-10-CM

## 2022-02-04 LAB
EKG ATRIAL RATE: 85 BPM
EKG ATRIAL RATE: 89 BPM
EKG P AXIS: -5 DEGREES
EKG P AXIS: 2 DEGREES
EKG P-R INTERVAL: 154 MS
EKG P-R INTERVAL: 154 MS
EKG Q-T INTERVAL: 388 MS
EKG Q-T INTERVAL: 406 MS
EKG QRS DURATION: 106 MS
EKG QRS DURATION: 108 MS
EKG QTC CALCULATION (BAZETT): 472 MS
EKG QTC CALCULATION (BAZETT): 483 MS
EKG R AXIS: -51 DEGREES
EKG R AXIS: -52 DEGREES
EKG T AXIS: 33 DEGREES
EKG T AXIS: 64 DEGREES
EKG VENTRICULAR RATE: 85 BPM
EKG VENTRICULAR RATE: 89 BPM

## 2022-02-04 NOTE — CARE COORDINATION
[facility] Transitions Initial Follow Up Call    Call within 2 business days of discharge: Yes    Patient: Gaurang Sales Patient : 1980    MRN: [id number]  Reason for Admission: Acute on chronic systolic congestive heart failure     Discharge Date: 2/3/22 RARS: Readmission Risk Score: 7.2 ( )      Last Discharge [facility]       Complaint Diagnosis Description Type Department Provider    22 Chest Pain Chest pain, unspecified type . .. ED to Hosp-Admission (Discharged) (ADMITTED) MARIA TERESA CAR 2 Maura Ramos MD; Jennie. .. Spoke with:Dane states he is doing pretty good today has life vest in place. Denies chest pain, SOB, dizziness, nausea, palpitations. Is eating and drinking good, normal bowel and bladder elimination. Medications reviewed and taking as directed. 1111 f complete. Spoke to patient about quiting smoking gave him tips on what to do to help with cravings. V/u Patient has no concerns at present time. Facility: [facility]    Non-face-to-face services provided:  Obtained and reviewed discharge summary and/or continuity of care documents  Education of patient/family/caregiver/guardian to support self-management-smoking sessetion  Transitions of Care Initial Call    Was this an external facility discharge? No     Challenges to be reviewed by the provider   Additional needs identified to be addressed with provider: No  none             Method of communication with provider : none      Advance Care Planning:   Does patient have an Advance Directive: reviewed and current, reviewed and needs to be updated, not on file; education provided, decision maker updated and referral to internal ACP facilitator. Was this a readmission?  No  Patient stated reason for admission: chest pain SOB  Patients top risk factors for readmission: functional physical ability  lack of knowledge about disease  medication management    Care Transition Nurse (CTN) contacted the patient by telephone to perform post hospital discharge assessment. Verified name and  with patient as identifiers. Provided introduction to self, and explanation of the CTN role. CTN reviewed discharge instructions, medical action plan and red flags with patient who verbalized understanding. Patient given an opportunity to ask questions and does not have any further questions or concerns at this time. Were discharge instructions available to patient? Yes. Reviewed appropriate site of care based on symptoms and resources available to patient including: PCP and Specialist. The patient agrees to contact the PCP office for questions related to their healthcare. Medication reconciliation was performed with patient, who verbalizes understanding of administration of home medications. Advised obtaining a 90-day supply of all daily and as-needed medications. Covid Risk Education     Educated patient about risk for severe COVID-19 due to risk factors according to CDC guidelines. LPN CC reviewed discharge instructions, medical action plan and red flag symptoms with the patient who verbalized understanding. Discussed COVID vaccination status: Yes. Education provided on COVID-19 vaccination as appropriate. Discussed exposure protocols and quarantine with CDC Guidelines. Patient was given an opportunity to verbalize any questions and concerns and agrees to contact LPN CC or health care provider for questions related to their healthcare. Reviewed and educated patient on any new and changed medications related to discharge diagnosis. Was patient discharged with a pulse oximeter? No Discussed and confirmed pulse oximeter discharge instructions and when to notify provider or seek emergency care. LPN CC provided contact information. Plan for follow-up call in 3-5 days based on severity of symptoms and risk factors.   Plan for next call: symptom management-life vest      Care Transitions 24 Hour Call    Do you have any ongoing symptoms?: No  Do you have a copy of your discharge instructions?: Yes  Do you have all of your prescriptions and are they filled?: Yes  Have you been contacted by a [employer] [address]?: No  Have you scheduled your follow up appointment?: Yes  How are you going to get to your appointment?: Car - drive self  Were you discharged with any Home Care or Post Acute Services: No  Do you feel like you have everything you need to keep you well at home?: Yes  Care Transitions Interventions         Follow Up  Future Appointments   Date Time Provider Aniceto Valverde   5/2/2022  1:00 PM Neyda George MD [id number] [facility] RENAN Castillo

## 2022-02-04 NOTE — DISCHARGE SUMMARY
[address]     Department of Internal Medicine - Staff Internal Medicine Teaching Service    INPATIENT DISCHARGE SUMMARY      Patient Identification:  Marcela Luna is a 37 y.o. male. :  1980  MRN: [id number]     Acct: [de-identified]   PCP: Hebert Johnson MD  Admit Date:  2022  Discharge date and time: 2/3/2022  4:26 PM   Attending Provider: No att. providers found                                     [address] Problem Lists:  Principal Problem:    Acute on chronic systolic congestive heart failure ([address]  Active Problems:    CHAITANYA (acute kidney injury) ([address]    Essential hypertension    Current every day smoker    Marijuana smoker    Elevated troponin    Class 2 obesity due to excess calories without serious comorbidity with body mass index (BMI) of 35.0 to 35.9 in adult    Prolonged Q-T interval on ECG    Multiple pulmonary nodules    Alcohol abuse    Epigastric pain    Elevated brain natriuretic peptide (BNP) level    Chest pain  Resolved Problems:    Hypertensive urgency      HOSPITAL STAY     Brief Inpatient course:   Marcela Luna is a 37 y.o. male who was admitted for the management of Acute on chronic systolic congestive heart failure ([address] presented to the emergency department with worsening shortness of breath, orthopnea, PND associated with chest pain. EMS was called and he received sublingual nitro spray x2 on his way to the ED. On evaluation in the ED, he was found to have elevated blood pressure 200/130, heart rate 103. Patient was started on nitro drip for hypertensive emergency in the setting of worsening heart failure. Labs revealed elevated proBNP, troponin 24-37, EKG showed normal sinus rhythm with left axis deviation. Patient was initially started on heparin drip but was discontinued given flat troponin. He was treated with IV Lasix.   Cardiology consult was placed for deconditioning due to CHF with reduced ejection fraction and possible evaluation of LifeVest versus AICD. His diuretic and heart failure medicines were readjusted by cardiology, recommend [address] [place] [address] outpatient follow-up for possible AICD. Echo was also done during this admission. Normal LV size . Severe global hypokinesis. Severely reduced LV systolic function with LVEF 25-30%. Last echo 2019 showed LVEF 15 to 20%. Detailed conversation done with the patient regarding medication compliance, alcohol abstinence, smoking cessation. Patient voiced understanding. Over the course of admission, his blood pressure was stabilized. Shortness of breath and orthopnea was resolved.   He improved significantly at his discharge on home medicines with the recommendation to follow-up with the PCP at primary cardiologist.    Procedures/ Significant Interventions:    Echocardiography    Consults:     Consults:     Final Specialist Recommendations/Findings:   IP CONSULT TO INTERNAL MEDICINE  IP CONSULT TO CASE MANAGEMENT  IP CONSULT TO CARDIOLOGY  IP CONSULT TO HEART FAILURE NURSE/COORDINATOR      Any Hospital Acquired Infections: none    Discharge Functional Status:  stable    DISCHARGE PLAN     Disposition: home    Patient Instructions:   Discharge Medication List as of 2/3/2022  3:59 PM      START taking these medications    Details   aspirin EC 81 MG EC tablet Take 1 tablet by mouth daily, Disp-90 tablet, R-0Normal         CONTINUE these medications which have NOT CHANGED    Details   enalapril (VASOTEC) 10 MG tablet Take 1 tablet by mouth daily, Disp-30 tablet, R-3Print      carvedilol (COREG) 12.5 MG tablet Take 1 tablet by mouth 2 times daily (with meals), Disp-60 tablet, R-3Print      atorvastatin (LIPITOR) 40 MG tablet Take 1 tablet by mouth nightly, Disp-30 tablet, R-3Normal      furosemide (LASIX) 80 MG tablet Take 1 tablet by mouth daily, Disp-60 tablet, R-3Normal         STOP taking these medications       hydroCHLOROthiazide (HYDRODIURIL) 25 MG tablet Comments:   Reason for Stopping:         potassium chloride (KLOR-CON M) 10 MEQ extended release tablet Comments:   Reason for Stopping:               Activity: activity as tolerated    Diet: cardiac diet    Follow-up:    Rowan Barfield MD  [address]  [phone]    Schedule an appointment as soon as possible for a visit in 1 week      Lyubov Kern, [phone] [facility]      Cardiology -- Thursday, February 24 at 1:00 at [facility]. office      Patient Instructions:   Please follow-up with the PCP within 1 week of discharge. Do your blood work including CBC and BMP within 2 days. Medication readjustment is done heart failure and hypertension. Please make sure take your medicines regularly and be very compliant. You are advised to take p.o. Lasix 80 mg once daily and Aldactone 25 mg once daily. In case of dropping blood pressure withhold diuretic or decrease the dose. Also continue to take your other home medicines. Fluid restriction 1500 to 2000 mL once daily. Also salt restriction to less than 2000 mg. You will need outpatient follow-up with cardiology clinic and will be evaluated for AICD placement, meanwhile you will be on life vest.  Also follow-up with heart failure clinic. Strongly encouraged to stop or cut down on alcohol and stop smoking. In case of worsening symptoms, seek immediate medical attention and come to the emergency department. Follow up labs: CBC and BMP within 3-4 days. Follow up imaging: None    Note that over 30 minutes was spent in preparing discharge papers, discussing discharge with patient, medication review, etc.    Nahomy Carr MD,   Internal Medicine Resident, PGY-1  [facility];  [place], [place]  2/3/2022, 8:16 PM

## 2022-02-11 ENCOUNTER — CARE COORDINATION (OUTPATIENT)
Dept: CASE MANAGEMENT | Age: 42
End: 2022-02-11

## 2022-02-11 NOTE — CARE COORDINATION
[facility] Transitions Follow Up Call    2022    Patient: Wayne Blake  Patient : 1980    MRN: [id number]  Reason for Admission: Acute on chronic systolic congestive heart failure      Discharge Date: 2/3/22 RARS: Readmission Risk Score: 7.2 ( )         Spoke with: Dane and he states he is doing alright, continues to wear life vest as directed. Has not seen any dr yet. Denies SOB, dizziness, palpitations, nausea. Has had some chest pain with exertion eating and drinking well has not stopped smoking but states he has cut down drastically has CHF clinic visit . Care Transitions Follow Up Call    Needs to be reviewed by the provider   Additional needs identified to be addressed with provider: No  none             Method of communication with provider : none      Care Transition Nurse (CTN) contacted the patient by telephone to follow up after admission on 2/3/22. Verified name and  with patient as identifiers. Addressed changes since last contact: none  Discussed follow-up appointments. If no appointment was previously scheduled, appointment scheduling offered: Yes. Is follow up appointment scheduled within 7 days of discharge? No.    Advance Care Planning:   Does patient have an Advance Directive: reviewed and current, reviewed and needs to be updated, not on file; education provided, decision maker updated and referral to internal ACP facilitator. CTN reviewed discharge instructions, medical action plan and red flags with patient and discussed any barriers to care and/or understanding of plan of care after discharge. Discussed appropriate site of care based on symptoms and resources available to patient including: PCP and Specialist. The patient agrees to contact the PCP office for questions related to their healthcare.      Patients top risk factors for readmission: lack of knowledge about disease  level of motivation  medication management  Interventions to address risk factors: Obtained and reviewed discharge summary and/or continuity of care documents          CTN provided contact information for future needs. Plan for follow-up call in 7-10 days based on severity of symptoms and risk factors. Plan for next call: symptom management-smoing chest pain        Care Transitions Subsequent and Final Call    Subsequent and Final Calls  Do you have any ongoing symptoms?: Yes  Onset of Patient-reported symptoms: In the past 7 days  Patient-reported symptoms: Chest Pain  Interventions for patient-reported symptoms: Other  Have your medications changed?: No  Do you have any questions related to your medications?: No  Do you currently have any active services?: No  Do you have any needs or concerns that I can assist you with?: No  Identified Barriers: Other  Care Transitions Interventions  Other Interventions:            Follow Up  Future Appointments   Date Time Provider Aniceto Valverde   2/14/2022  9:00 AM [facility] CHF CLINIC RM 1 [facility] CHF CLI [facility]   5/2/2022  1:00 PM Brody Brian MD [facility] Joseluis Perez LPN

## 2022-02-14 ENCOUNTER — HOSPITAL ENCOUNTER (OUTPATIENT)
Dept: OTHER | Age: 42
Discharge: HOME | End: 2022-02-14
Payer: MEDICAID

## 2022-02-14 VITALS
SYSTOLIC BLOOD PRESSURE: 122 MMHG | RESPIRATION RATE: 16 BRPM | OXYGEN SATURATION: 95 % | HEART RATE: 78 BPM | DIASTOLIC BLOOD PRESSURE: 78 MMHG | BODY MASS INDEX: 32.67 KG/M2 | WEIGHT: 268.4 LBS

## 2022-02-14 DIAGNOSIS — F17.200 CURRENT EVERY DAY SMOKER: ICD-10-CM

## 2022-02-14 DIAGNOSIS — I50.22 CHRONIC SYSTOLIC (CONGESTIVE) HEART FAILURE (HCC): Primary | ICD-10-CM

## 2022-02-14 PROBLEM — I50.9 CONGESTIVE HEART FAILURE (HCC): Status: RESOLVED | Noted: 2021-03-02 | Resolved: 2022-02-14

## 2022-02-14 PROCEDURE — 99212 OFFICE O/P EST SF 10 MIN: CPT

## 2022-02-14 PROCEDURE — 99215 OFFICE O/P EST HI 40 MIN: CPT | Performed by: NURSE PRACTITIONER

## 2022-02-14 ASSESSMENT — ENCOUNTER SYMPTOMS
ABDOMINAL PAIN: 0
EYE DISCHARGE: 0
COUGH: 1
SHORTNESS OF BREATH: 1
BLOOD IN STOOL: 0

## 2022-02-14 ASSESSMENT — PAIN DESCRIPTION - LOCATION: LOCATION: CHEST

## 2022-02-14 ASSESSMENT — PAIN SCALES - GENERAL: PAINLEVEL_OUTOF10: 9

## 2022-02-14 ASSESSMENT — PAIN DESCRIPTION - PAIN TYPE: TYPE: ACUTE PAIN

## 2022-02-14 ASSESSMENT — PAIN DESCRIPTION - DESCRIPTORS: DESCRIPTORS: CONSTANT;ACHING;BURNING;STABBING

## 2022-02-14 ASSESSMENT — PAIN DESCRIPTION - ORIENTATION: ORIENTATION: LEFT

## 2022-02-14 NOTE — PROGRESS NOTES
CHF Clinic at [address] [place] [address]    Office: [phone] Fax: [phone] [facility] CHF CLINIC  [phone]  Dept: [phone]  Loc: [phone]    Keiko Dao is a 37 y.o. male who presents today for CHF evaluation. HPI:     +  shortness of breath, with exertion. Recovers slowly. +   fatigue  Rare  Edema in legs. Rings are fitting now   + occas chest pain   +  palpitations   No orthopnea , nor PND       D/c summary states lasix 80 mg qd, but pt does not have a recent RX for this from d/c. Pt has been taking hctz, which was d/c at discharge of last admit. CHF Clinic called his [facility] today. Pt does have orders for lasix 80 mg and he will go  med today, and start med immediately. Pt was d/c with instrucitons to have bmp and cbc done after admit. These labs not completed. Past Medical History:   Diagnosis Date    [facility]     Congestive heart failure ([facility] [address] 3/2/2021    Hyperlipidemia     Hypertension     Testicular torsion 2012    s/p Left orchiectomy     Past Surgical History:   Procedure Laterality Date    [facility]NEE [facility]ROSCOPY Left 2009    [facility]    SHOULDER ARTHROPLASTY Left 1995    [facility]    TESTICLE REMOVAL Left 2012       Family History   Problem Relation Age of Onset    Diabetes Maternal Grandmother     Stroke Neg Hx     Heart Disease Neg Hx     Cancer Neg Hx        Social History     Tobacco Use    Smoking status: Current Every Day Smoker     Packs/day: 0.50     Years: 18.00     Pack years: 9.00     Types: Cigarettes    Smokeless tobacco: Never Used   Substance Use Topics    Alcohol use:  Yes     Alcohol/week: 2.0 standard drinks     Types: 2 Cans of beer per week     Comment: 2 cans of beer daily      Current Outpatient Medications   Medication Sig Dispense Refill    aspirin EC 81 MG EC tablet Take 1 tablet by mouth lower leg: No edema. Left lower leg: No edema. Skin:     General: Skin is warm and dry. Neurological:      Mental Status: He is alert and oriented to person, place, and time. /78   Pulse 78   Resp 16   Wt 268 lb 6.4 oz (121.7 kg)   SpO2 95%   BMI 32.67 kg/m²           Lower Extremity Measurements in cm. R Calf Circumference (cm): 43 cm  L Calf Circumference (cm): 43 cm  R Ankle Circumference (cm): 25 cm  L Ankle Circumference (cm): 25 cm    CBC:   Lab Results   Component Value Date    WBC 6.7 02/03/2022    RBC 5.60 02/03/2022    HGB 15.3 02/03/2022    HCT 47.0 02/03/2022    MCV 83.9 02/03/2022    MCH 27.3 02/03/2022    MCHC 32.6 02/03/2022    RDW 14.6 02/03/2022     02/03/2022    MPV 10.3 02/03/2022     CMP:    Lab Results   Component Value Date     02/03/2022    K 3.8 02/03/2022     02/03/2022    CO2 23 02/03/2022    BUN 18 02/03/2022    CREATININE 0.86 02/03/2022    GFRAA >60 02/03/2022    LABGLOM >60 02/03/2022    GLUCOSE 113 02/03/2022    PROT 7.2 01/31/2022    LABALBU 4.1 01/31/2022    CALCIUM 8.9 02/03/2022    BILITOT 0.39 01/31/2022    ALKPHOS 109 01/31/2022    AST 18 01/31/2022    ALT 22 01/31/2022     Lab Results   Component Value Date    LABA1C 6.0 02/01/2022         Cardiac Catheterization 3/30/21      Conclusions      Procedure Summary      LMCA: Normal 0% stenosis. LAD: Mild irregularities 10-20%. LCx: Mild irregularities 20-30%. RCA: Mild irregularities 10-20%. SEVERE LV SYSTOLIC DYSFUNCTION WITH LVEF OF 15%   DILATED AORTIC ROOT, AND SINUSES OF VALSALVA NOTED. MILD PULMONARY HTN NOTED ON RIGHT HEART CATH. Recommendations      1. CONTINUE AFTER LOAD REDUCTION THERAPY. 2. CESSATION OF ALCOHOL USE. 3. SMOKING CESSATION. 4. ELECTIVE ICD IMPLANT PER SCDHEFT STUDY CRITERIA., IF LVEF DOES NOT   IMPTOVE IN THE FUTURE.       Signature      ----------------------------------------------------------------   Electronically signed by Elijah Katz(Performing   Physician) on 03/04/2021 10:55          Summary  Normal LV size . Severe global hypokinesis. Severely reduced LV systolic function with LVEF 25-30 %. Normal RV size and function. LA and RA appears normal in size. No obvious significant structural valvular abnormality noted. Mild AR and MR noted  Mild to moderately increased aortic root and ascending aorta dimension. 4.5  cm  No significant pericardial effusion noted. No obvious intra-cardiac mass noted. IVC normal diameter and inspiratory variation indicating normal RA filling  pressure. Signature  ----------------------------------------------------------------------------   Electronically signed by Giulia Beltran(Interpreting physician) on   02/03/2022 11:00 AM      :Assessment      1. Chronic systolic (congestive) heart failure ([facility] [address]    2. Current every day smoker        :Plan      1. Chronic systolic (congestive) heart failure ([facility] [address]    2. Current every day smoker      Initial  CHF Clinic   First wt taken, first  leg measurements taken. + rales . Pt has not been taking lasix per d/c order. He plans to  lasix at pharmacy today. CHF Clinic confirmed with pharm pt has RX refill waiting for him. Will have pt f/u with  CHF Clinic in 2 weeks for re assessment after taking lasix. Pt has TCC appt on 2/24. Will have labs done at that time , following his start of lasix 80 mg qd. Pt had CHAITANYA during last admit, so pt will be monitored closely. On Guideline-Directed Medication Therapy:     ACEI / ARB / ARNi: switch to Entresto from ACE I will be discussed next appt. Beta - blocker  Diuretic Therapy      CHF Education completed. Reviewed all educational  material in the new pt education folder.     Educational materials  given including multiple pages on Low Sodium food choices, how to read a food label, a Weight and Heart Failure Zone log, and illustrated Signs and Symptoms of Heart Failure     Pt counseled on smoking cessation. Keep [facility] app[facility] at [facility] 2 weeks           Orders Placed This Encounter   Procedures    Basic Metabolic Panel     Standing Status:   Future     Standing Expiration Date:   2/14/2023     No orders of the defined types were placed in this encounter. Patientgiven verbal and/or written educational instructions. Follow up as directed. I have reviewed and agree with the nursing documentation. Verbally reviewed medication list with patient; patient verbalized understanding. Discussed 2000mg/day sodium restricted diet; patient verbalized understanding. Moderate daily exercise encouraged as tolerated. Discussed rest breaks as needed; patient verbalized understanding. Patient instructed to weigh self at the same time of each day, using same clothes and same scale; reinforced teaching to monitor for 3-5 lb weight increase over 1-2 days, and to notify the CHF clinic at [phone] or physician office if weight change noted. Patient verbalized understanding. Risks of smoking discussed with the patient if applicable; patient strongly discouraged to smoke. Patient verbalized understanding. Signs and symptoms of CHF discussed with patient, such as feeling more tired than normal, feeling short of breath, coughing that increases when you lie down, sudden weight gain, swelling of your feet, legs or belly. Patient verbalized understanding to notify the CHF clinic at [phone] or physician office if these symptoms occur. Compliance with plan of care and further disease process causes discussed with patient, patient encouraged to keep all follow up appointments. Patient verbalized understanding. Echocardiogram reviewed. Labs reviewed. Labs ordered   Medications reviewed. Confirmed lasix available at his pharm. Patient was seen with total face to face time of  >40 minutes. More than 50% of this visit was counseling and education, & coordination of care. Electronically signedby BILL Teague CNP on 2/14/2022 at 10:06 AM

## 2022-02-16 ENCOUNTER — CARE COORDINATION (OUTPATIENT)
Dept: CASE MANAGEMENT | Age: 42
End: 2022-02-16

## 2022-02-16 NOTE — CARE COORDINATION
[facility] Transitions Follow Up Call    2022    Patient: Tanmay Salamanca  Patient : 1980    MRN: [id number]  Reason for Admission: Acute on chronic systolic congestive heart failure     Discharge Date: 2/3/22 RARS: Readmission Risk Score: 7.2 ( )         Spoke with: Called to speak with patient for update with transition of care. Left HIPPA compliant voice message with contact information [phone] for a call  Back with an update. Care Transitions Subsequent and Final Call    Subsequent and Final Calls  Care Transitions Interventions  Other Interventions:            Follow Up  Future Appointments   Date Time Provider Aniceto Valverde   3/2/2022  9:00 AM [facility] CHF CLINIC RM 1 [facility] CHF CLI [facility]   2022  1:00 PM Tara Ortez MD [facility] Liliam Elliott LPN

## 2022-02-23 ENCOUNTER — CARE COORDINATION (OUTPATIENT)
Dept: CASE MANAGEMENT | Age: 42
End: 2022-02-23

## 2022-02-23 NOTE — CARE COORDINATION
[facility] Transitions Follow Up Call    2022    Patient: Anna Ellis  Patient : 1980    MRN: <[id number]>  Reason for Admission:Acute on chronic systolic congestive heart failure    Discharge Date: 2/3/22 RARS: Readmission Risk Score: 7.2 ( )    Attempted to contact patient for a transitional care follow up. Unable to reach patient. Caller left a Hipaa compliant message introducing self, purpose of call and contact information requesting a call back. Care Transitions will continue to monitor. Care Transitions Subsequent and Final Call    Subsequent and Final Calls  Care Transitions Interventions  Other Interventions:            Follow Up  Future Appointments   Date Time Provider Aniceto Valverde   3/2/2022  9:00 AM [facility] CHF CLINIC RM 1 [facility] CHF CLI [facility]   2022  1:00 PM [facility]       America Prieto RN

## 2022-02-24 ENCOUNTER — HOSPITAL ENCOUNTER (OUTPATIENT)
Age: 42
Setting detail: SPECIMEN
Discharge: HOME | End: 2022-02-24
Payer: COMMERCIAL

## 2022-02-24 DIAGNOSIS — I50.22 CHRONIC SYSTOLIC (CONGESTIVE) HEART FAILURE (HCC): ICD-10-CM

## 2022-02-24 LAB
ANION GAP SERPL CALCULATED.3IONS-SCNC: 14 MMOL/L (ref 9–17)
BUN BLDV-MCNC: 15 MG/DL (ref 6–20)
CALCIUM SERPL-MCNC: 9.4 MG/DL (ref 8.6–10.4)
CHLORIDE BLD-SCNC: 95 MMOL/L (ref 98–107)
CO2: 24 MMOL/L (ref 20–31)
CREAT SERPL-MCNC: 0.8 MG/DL (ref 0.7–1.2)
GFR AFRICAN AMERICAN: >60 ML/MIN
GFR NON-AFRICAN AMERICAN: >60 ML/MIN
GFR SERPL CREATININE-BSD FRML MDRD: ABNORMAL ML/MIN/{1.73_M2}
GLUCOSE BLD-MCNC: 155 MG/DL (ref 70–99)
POTASSIUM SERPL-SCNC: 4.2 MMOL/L (ref 3.7–5.3)
SODIUM BLD-SCNC: 133 MMOL/L (ref 135–144)

## 2022-02-24 PROCEDURE — 80048 BASIC METABOLIC PNL TOTAL CA: CPT

## 2022-02-24 PROCEDURE — 36415 COLL VENOUS BLD VENIPUNCTURE: CPT

## 2022-02-25 ENCOUNTER — CARE COORDINATION (OUTPATIENT)
Dept: CASE MANAGEMENT | Age: 42
End: 2022-02-25

## 2022-02-25 NOTE — CARE COORDINATION
[facility] Transitions Follow Up Call    2022    Patient: Danitza Alberts  Patient : 1980    MRN: [id number]  Reason for Admission: Acute on chronic systolic congestive heart failure      Discharge Date: 2/3/22 RARS: Readmission Risk Score: 7.2 ( )         Spoke with: Called to speak with patient for update with transition of care. Left HIPPA compliant voice message with contact information [phone] for a call  Back with an update. Care Transitions Subsequent and Final Call    Subsequent and Final Calls  Care Transitions Interventions  Other Interventions:            Follow Up  Future Appointments   Date Time Provider Aniceto Valverde   3/2/2022  9:00 AM [facility] CHF CLINIC RM 1 [facility] CHF CLI [facility]   2022  1:00 PM Amina Ochoa MD [facility] Kobi Victoria LPN

## 2022-03-02 ENCOUNTER — HOSPITAL ENCOUNTER (OUTPATIENT)
Dept: OTHER | Age: 42
Discharge: HOME | End: 2022-03-02
Payer: MEDICAID

## 2022-03-02 VITALS
DIASTOLIC BLOOD PRESSURE: 110 MMHG | SYSTOLIC BLOOD PRESSURE: 160 MMHG | RESPIRATION RATE: 20 BRPM | WEIGHT: 279 LBS | OXYGEN SATURATION: 95 % | BODY MASS INDEX: 33.96 KG/M2 | HEART RATE: 91 BPM

## 2022-03-02 DIAGNOSIS — I50.22 CHRONIC SYSTOLIC (CONGESTIVE) HEART FAILURE (HCC): Primary | ICD-10-CM

## 2022-03-02 DIAGNOSIS — Z72.0 TOBACCO ABUSE: ICD-10-CM

## 2022-03-02 DIAGNOSIS — R03.0 ELEVATED BLOOD PRESSURE READING: ICD-10-CM

## 2022-03-02 PROBLEM — R77.8 ELEVATED TROPONIN: Status: RESOLVED | Noted: 2021-03-02 | Resolved: 2022-03-02

## 2022-03-02 PROCEDURE — 99212 OFFICE O/P EST SF 10 MIN: CPT

## 2022-03-02 PROCEDURE — 99213 OFFICE O/P EST LOW 20 MIN: CPT

## 2022-03-02 PROCEDURE — 99213 OFFICE O/P EST LOW 20 MIN: CPT | Performed by: NURSE PRACTITIONER

## 2022-03-02 RX ORDER — CARVEDILOL 25 MG/1
25 TABLET ORAL 2 TIMES DAILY
Qty: 60 TABLET | Refills: 2 | Status: SHIPPED | OUTPATIENT
Start: 2022-03-02

## 2022-03-02 ASSESSMENT — ENCOUNTER SYMPTOMS
COUGH: 0
BLOOD IN STOOL: 0
SHORTNESS OF BREATH: 0
ABDOMINAL PAIN: 0
EYE DISCHARGE: 0

## 2022-03-02 ASSESSMENT — PAIN DESCRIPTION - LOCATION: LOCATION: BACK

## 2022-03-02 ASSESSMENT — PAIN SCALES - GENERAL: PAINLEVEL_OUTOF10: 5

## 2022-03-02 ASSESSMENT — PAIN DESCRIPTION - PAIN TYPE: TYPE: CHRONIC PAIN

## 2022-03-02 ASSESSMENT — PAIN DESCRIPTION - DESCRIPTORS: DESCRIPTORS: ACHING

## 2022-03-02 NOTE — PROGRESS NOTES
Date:  3/2/2022  Time:  9:55 AM    CHF Clinic at [facility]    Office: [phone] Fax: [phone]    Re:  Jatinder Melton   Patient : 1980    Vital Signs: BP (!) 160/110   Pulse 91   Resp 20   Wt 279 lb (126.6 kg)   SpO2 95%   BMI 33.96 kg/m²                       O2 Device: None (Room air)                           No results for input(s): CBC, HGB, HCT, WBC, PLATELET, NA, K, CL, CO2, BUN, CREATININE, GLUCOSE, BNP, INR in the last 72 hours. Respiratory:    Assessment  Charting Type: Reassessment    Breath Sounds  Right Upper Lobe: Clear  Right Middle Lobe: Clear  Right Lower Lobe: Clear  Left Upper Lobe: Clear  Left Lower Lobe: Clear    Cough/Sputum  Cough: Productive  Frequency: Infrequent  Sputum Amount: Small  Sputum Color: Tan       Peripheral Vascular  RLE Edema: None  LLE Edema: None    Complaints: Nothing new    Physician Orders Increase coreg to 25 mg 2x day. Next visit one week. Comment : Patient here ambulatory for routine visit. 2nd visit to CHF clinic. Weight increased 11 lbs in 3 weeks. Patient is unsure why. No new or acute s/s CHF. BP elevated. Denies chest pain, dizziness or headache. States compliance with all meds. Has started taking lasix 80 mg 1x day. Not on HCTZ. Patient has recently cut down on smoking and may be eating more. LONG discussion about low salt diet and fluid limits. He may be taking in too many liquids. Now wearing a \"life-vest\". Next visit to cardiology is  at 1315. He now has no insurance. Has an appt with disability tomorrow. Discussed all with Ric Barron CNP who also assessed patient. Above orders received. Patient instructed to take 2 carvedilol 2x day until current bottle is out. Next bottle will have 25 mg for 2x day. Betsy Fitzpatrick to call in new script. Next visit one week. Encouraged to purchase a BP monitor for home use.      Electronically signed by Oliver Baxter RN on 3/2/2022 at 9:55 AM

## 2022-03-02 NOTE — PROGRESS NOTES
CHF Clinic at [facility]    Office: [phone] Fax: [phone] [facility] CHF CLINIC  [phone]  Dept: [phone]  Loc: [phone]    Redd Santoro is a 37 y.o. male who presents today for CHF evaluation. HPI:     Pt in here for f/u after his initial  CHF Clinic appt. Last appt he was found not to be taking his lasix, per the d/c med list. He was agreeable to picking up at pharm and started right away. Pt has h/o CHAITANYA. Pt here today w/elevated BP. His BP was not elevated at last Cardiology appt, per office note. Pt has 11 lbs wt increase today. He reports cutting back on smoking and is snacking more. Pt is wearing life vest today, was not wearing at last appt. Since last appt pt has been taking lasix 80 mg qd. He denies edema. He reports he may be drinking > 2L fluids /day. Past Medical History:   Diagnosis Date    Alcohol abuse     Alcoholic cardiomyopathy ([address]     [facility]     Congestive heart failure ([facility] [address] 03/02/2021    Hyperlipidemia     Hypertension     Hypertensive crisis     Testicular torsion 2012    s/p Left orchiectomy     Past Surgical History:   Procedure Laterality Date    [facility]COPY Left 2009    [facility]    SHOULDER ARTHROPLASTY Left 1995    [facility]    TESTICLE REMOVAL Left 2012       Family History   Problem Relation Age of Onset    Diabetes Maternal Grandmother     Stroke Neg Hx     Heart Disease Neg Hx     Cancer Neg Hx        Social History     Tobacco Use    Smoking status: Current Every Day Smoker     Packs/day: 0.50     Years: 18.00     Pack years: 9.00     Types: Cigarettes    Smokeless tobacco: Never Used    Tobacco comment: March 2022: reports smoking approx 6 cigs/ day   Substance Use Topics    Alcohol use:  Yes     Alcohol/week: 2.0 standard drinks     Types: 2 Cans of beer per week Comment: 2 cans of beer daily      Current Outpatient Medications   Medication Sig Dispense Refill    aspirin EC 81 MG EC tablet Take 1 tablet by mouth daily 90 tablet 0    enalapril (VASOTEC) 10 MG tablet Take 1 tablet by mouth daily 30 tablet 3    carvedilol (COREG) 12.5 MG tablet Take 1 tablet by mouth 2 times daily (with meals) 60 tablet 3    atorvastatin (LIPITOR) 40 MG tablet Take 1 tablet by mouth nightly 30 tablet 3    furosemide (LASIX) 80 MG tablet Take 1 tablet by mouth daily 60 tablet 3     No current facility-administered medications for this encounter. No Known Allergies      Subjective:      Review of Systems   Constitutional: Negative for activity change, chills, fatigue and fever. Eyes: Negative for discharge and visual disturbance. Respiratory: Negative for cough and shortness of breath. Cardiovascular: Negative for chest pain and leg swelling. Gastrointestinal: Negative for abdominal pain and blood in stool. Endocrine: Negative for cold intolerance and heat intolerance. Genitourinary: Negative for dysuria and flank pain. Musculoskeletal: Negative for joint swelling and myalgias. Skin: Negative for pallor and rash. Neurological: Negative for dizziness and headaches. Psychiatric/Behavioral: Negative for hallucinations and suicidal ideas. Objective:     Physical Exam  Vitals and nursing note reviewed. Constitutional:       Comments:      HENT:      Head: Normocephalic and atraumatic. Eyes:      General: No scleral icterus. Conjunctiva/sclera: Conjunctivae normal.   Cardiovascular:      Rate and Rhythm: Normal rate and regular rhythm. Heart sounds: Normal heart sounds. Pulmonary:      Effort: Pulmonary effort is normal.      Breath sounds: Normal breath sounds. No wheezing or rales. Abdominal:      General: Bowel sounds are normal.      Palpations: Abdomen is soft. Musculoskeletal:         General: Normal range of motion.       Cervical back: Normal range of motion. Right lower leg: No edema. Left lower leg: No edema. Skin:     General: Skin is warm and dry. Neurological:      Mental Status: He is alert and oriented to person, place, and time. BP (!) 160/110   Pulse 91   Resp 20   Wt 279 lb (126.6 kg)   SpO2 95%   BMI 33.96 kg/m²   O2 Device: None (Room air)       Lower Extremity Measurements in cm. R Calf Circumference (cm): 44 cm  L Calf Circumference (cm): 45 cm  R Ankle Circumference (cm): 26 cm  L Ankle Circumference (cm): 26 cm    CBC:   Lab Results   Component Value Date    WBC 6.7 02/03/2022    RBC 5.60 02/03/2022    HGB 15.3 02/03/2022    HCT 47.0 02/03/2022    MCV 83.9 02/03/2022    MCH 27.3 02/03/2022    MCHC 32.6 02/03/2022    RDW 14.6 02/03/2022     02/03/2022    MPV 10.3 02/03/2022     CMP:    Lab Results   Component Value Date     02/24/2022    K 4.2 02/24/2022    CL 95 02/24/2022    CO2 24 02/24/2022    BUN 15 02/24/2022    CREATININE 0.80 02/24/2022    GFRAA >60 02/24/2022    LABGLOM >60 02/24/2022    GLUCOSE 155 02/24/2022    PROT 7.2 01/31/2022    LABALBU 4.1 01/31/2022    CALCIUM 9.4 02/24/2022    BILITOT 0.39 01/31/2022    ALKPHOS 109 01/31/2022    AST 18 01/31/2022    ALT 22 01/31/2022     Lab Results   Component Value Date    LABA1C 6.0 02/01/2022     CONCLUSIONS     Summary  Normal LV size . Severe global hypokinesis. Severely reduced LV systolic function with LVEF 25-30 %. Normal RV size and function. LA and RA appears normal in size. No obvious significant structural valvular abnormality noted. Mild AR and MR noted  Mild to moderately increased aortic root and ascending aorta dimension. 4.5  cm  No significant pericardial effusion noted. No obvious intra-cardiac mass noted.   IVC normal diameter and inspiratory variation indicating normal RA filling  pressure.     Signature  ----------------------------------------------------------------------------   Electronically signed by Giulia Beltran(Interpreting physician) on   02/03/2022 11:00 AM      :Assessment      1. Chronic systolic (congestive) heart failure (HCC)    2. Elevated blood pressure reading    3. Tobacco abuse        :Plan      1. Chronic systolic (congestive) heart failure (HCC)    2. Elevated blood pressure reading    3. Tobacco abuse        On Guideline-Directed Medication Therapy:     ACEI / ARB / ARNi:  Beta - blocker  Diuretic Therapy          Pt taking appropriate meds for HF, but his BP is elevated. Pt currently taking coreg 12.5 mg bid, will increase to coreg 25 BID. Pt to RTC 1 week for close BP monitoring. Pt not fluid overloaded on exam. Negative for b/l rales; improvement since last appt. His wt gain may be explained in part  by additional wt of life vest, and increased snacking while reducing his smoking habit. Repeated education on low Na+ diet and limited fluid (<= 64 oz/day) intake. Improved fluid intake and the start of increased coreg will hopefully correct wt gain and BP reading. Close follow up will Continue. RTC 1 Week. When BP is controlled, will consider switch of ACE to ARNi      No orders of the defined types were placed in this encounter. No orders of the defined types were placed in this encounter. Patientgiven verbal and/or written educational instructions. Follow up as directed. I have reviewed and agree with the nursing documentation. Verbally reviewed medication list with patient; patient verbalized understanding. Discussed 2000mg/day sodium restricted diet; patient verbalized understanding. Moderate daily exercise encouraged as tolerated. Discussed rest breaks as needed; patient verbalized understanding.      Patient instructed to weigh self at the same time of each day, using same clothes and same scale; reinforced teaching to monitor for 3-5 lb weight increase over 1-2 days, and to notify the CHF clinic at [phone] or physician office if weight change noted. Patient verbalized understanding. Risks of smoking discussed with the patient if applicable; patient strongly discouraged to smoke. Patient verbalized understanding. Signs and symptoms of CHF discussed with patient, such as feeling more tired than normal, feeling short of breath, coughing that increases when you lie down, sudden weight gain, swelling of your feet, legs or belly. Patient verbalized understanding to notify the CHF clinic at [phone] or physician office if these symptoms occur. Compliance with plan of care and further disease process causes discussed with patient, patient encouraged to keep all follow up appointments. Patient verbalized understanding. Echocardiogram reviewed. Labs reviewed. Medications reviewed.            Electronically signedby BILL Contreras CNP on 3/2/2022 at 4:43 PM

## 2022-03-03 ENCOUNTER — CARE COORDINATION (OUTPATIENT)
Dept: CASE MANAGEMENT | Age: 42
End: 2022-03-03

## 2022-03-03 NOTE — CARE COORDINATION
Juma [facility] Transitions Follow Up Call    3/3/2022    Patient: Megan Orozco  Patient : 1980   MRN: [id number]  Reason for Admission: CHF  Discharge Date: 2/3/22 RARS: Readmission Risk Score: 7.2 ( )         Spoke with: [facility] Transitions Subsequent and Final Call    Subsequent and Final Calls  Do you have any ongoing symptoms?: No  Have your medications changed?: No  Do you have any questions related to your medications?: No  Do you currently have any active services?: No  Do you have any needs or concerns that I can assist you with?: No  Care Transitions Interventions  Other Interventions:       Doing well, has seen his cardiologist, all questions, concerns addressed at that appointment. Is out for lunch at time of call. Care Transitions Episode Completed. Care Transitions Follow Up Call    Needs to be reviewed by the provider   Additional needs identified to be addressed with provider: No  none             Method of communication with provider : none      Care Transition Nurse (CTN) contacted the patient by telephone to follow up after admission. Verified name and  with patient as identifiers. Addressed changes since last contact: none  Discussed follow-up appointments. If no appointment was previously scheduled, appointment scheduling offered: completed. Is follow up appointment scheduled within 7 days of discharge? Yes. Advance Care Planning:   Does patient have an Advance Directive: reviewed and current, reviewed and needs to be updated, not on file; education provided, not on file, patient declined education, decision maker updated and referral to internal ACP facilitator. CTN reviewed discharge instructions, medical action plan and red flags with patient and discussed any barriers to care and/or understanding of plan of care after discharge.  Discussed appropriate site of care based on symptoms and resources available to patient including: PCP and Specialist. The patient agrees to contact the PCP office for questions related to their healthcare.      Patients top risk factors for readmission: medical condition-CHF  Interventions to address risk factors: Assessment and support for treatment adherence and medication management-CV assessment    Follow Up  Future Appointments   Date Time Provider Aniceto Valverde   3/9/2022 10:00 AM [facility]   5/2/2022  1:00 PM Jorene Crigler, MD [facility] Redd Rollins RN

## 2022-03-09 ENCOUNTER — HOSPITAL ENCOUNTER (OUTPATIENT)
Dept: OTHER | Age: 42
Discharge: HOME | End: 2022-03-09
Payer: MEDICAID

## 2022-03-09 VITALS
RESPIRATION RATE: 16 BRPM | OXYGEN SATURATION: 94 % | SYSTOLIC BLOOD PRESSURE: 140 MMHG | BODY MASS INDEX: 33.84 KG/M2 | WEIGHT: 278 LBS | HEART RATE: 84 BPM | DIASTOLIC BLOOD PRESSURE: 80 MMHG

## 2022-03-09 PROCEDURE — 99212 OFFICE O/P EST SF 10 MIN: CPT

## 2022-03-09 NOTE — PROGRESS NOTES
Date:  3/9/2022  Time:  10:25 AM    CHF Clinic at [facility]    Office: [phone] Fax: [phone]    Re:  Hermes Melton   Patient : 1980    Vital Signs: BP (!) 140/80   Pulse 84   Resp 16   Wt 278 lb (126.1 kg)   SpO2 94%   BMI 33.84 kg/m²                                                   No results for input(s): CBC, HGB, HCT, WBC, PLATELET, NA, K, CL, CO2, BUN, CREATININE, GLUCOSE, BNP, INR in the last 72 hours. Respiratory:    Assessment  Charting Type: Reassessment    Breath Sounds  Right Upper Lobe: Clear  Right Middle Lobe: Clear  Right Lower Lobe: Clear  Left Upper Lobe: Clear  Left Lower Lobe: Clear              Peripheral Vascular  RLE Edema: None  LLE Edema: None      Complaints: No new complaints    Physician Orders No new orders     Comment : Patient seen a week ago had B/P 160/110 increased his Coreg to 25 mg PO BID today his blood pressure was 140/80 at beginning of visit and at end of visiy was 140/80. Denies any dizziness ,vision changes. Has no SOB after walking back her from front office denies chest pain. Jolyn Blizzard is still in place see's cardiology [facility] 2022. Discussed in detail low sodium diet 2000mg per day,and 64 ounces of fluid per day. Is following a close low sodium diet. Has not missed any doses of Lasix 80mg PO q day. We will see in 1 month 2022.     Electronically signed by Demario Golden RN on 3/9/2022 at 10:25 AM

## 2022-04-06 ENCOUNTER — HOSPITAL ENCOUNTER (OUTPATIENT)
Dept: OTHER | Age: 42
Discharge: HOME | End: 2022-04-06
Payer: MEDICAID

## 2022-04-06 VITALS
RESPIRATION RATE: 16 BRPM | OXYGEN SATURATION: 96 % | DIASTOLIC BLOOD PRESSURE: 95 MMHG | HEART RATE: 82 BPM | WEIGHT: 280 LBS | BODY MASS INDEX: 34.08 KG/M2 | SYSTOLIC BLOOD PRESSURE: 160 MMHG

## 2022-04-06 PROCEDURE — 99212 OFFICE O/P EST SF 10 MIN: CPT

## 2022-04-06 NOTE — PROGRESS NOTES
Date:  2022  Time:  10:27 AM    CHF Clinic at [facility]    Office: [phone] Fax: [phone]    Re:  Ralf Melton   Patient : 1980    Vital Signs: BP (!) 160/95   Pulse 82   Resp 16   Wt 280 lb (127 kg)   SpO2 96%   BMI 34.08 kg/m²                                                   No results for input(s): CBC, HGB, HCT, WBC, PLATELET, NA, K, CL, CO2, BUN, CREATININE, GLUCOSE, BNP, INR in the last 72 hours. Respiratory:    Assessment  Charting Type: Reassessment    Breath Sounds  Right Upper Lobe: Clear  Right Middle Lobe: Clear  Right Lower Lobe: Clear  Left Upper Lobe: Clear  Left Lower Lobe: Clear              Peripheral Vascular  RLE Edema: None  LLE Edema: None      Complaints: No new complaints    Physician Orders No new orders     Comment : Weight is up 2 pounds from last months visit. Admits to having more fluids and a few high salt meals reinforced importance of maintaing the 64 ounces of fluid and staying below 2000mg of sodium per day. Called [facility] talked to [facility] he will refill all his medications today and request for his Lipitor since no refills left. Patients blood pressure a little high today when he got here 160/95 but did not have his Vasotec this morning he will go get his meds when he leaves this appt. Today. We will see in 1 month 2022.     Electronically signed by Valentín Zapata RN on 2022 at 10:27 AM

## 2022-05-12 ENCOUNTER — HOSPITAL ENCOUNTER (OUTPATIENT)
Facility: MEDICAL CENTER | Age: 42
End: 2022-05-12

## 2022-05-25 ENCOUNTER — HOSPITAL ENCOUNTER (OUTPATIENT)
Dept: OTHER | Age: 42
Discharge: HOME | End: 2022-05-25
Payer: MEDICAID

## 2022-05-25 VITALS
OXYGEN SATURATION: 94 % | BODY MASS INDEX: 33.89 KG/M2 | SYSTOLIC BLOOD PRESSURE: 170 MMHG | RESPIRATION RATE: 20 BRPM | DIASTOLIC BLOOD PRESSURE: 104 MMHG | WEIGHT: 278.4 LBS | HEART RATE: 92 BPM

## 2022-05-25 PROCEDURE — 99212 OFFICE O/P EST SF 10 MIN: CPT

## 2022-05-25 NOTE — PROGRESS NOTES
Verbally reviewed medication list with patient; patient verbalized understanding. Discussed 2000mg/day sodium restricted diet; patient verbalized understanding. Moderate daily exercise encouraged as tolerated. Discussed rest breaks as needed; patient verbalized understanding. Patient instructed to weigh self at the same time of each day, using same clothes and same scale; reinforced teaching to monitor for 3-5 lb weight increase over 1-2 days, and to notify the CHF clinic at [phone] or physician office if weight change noted. Patient verbalized understanding. Risks of smoking discussed with the patient if applicable; patient strongly discouraged to smoke. Patient verbalized understanding. Signs and symptoms of CHF discussed with patient, such as feeling more tired than normal, feeling short of breath, coughing that increases when you lie down, sudden weight gain, swelling of your feet, legs or belly. Patient verbalized understanding to notify the CHF clinic at [phone] or physician office if these symptoms occur. Compliance with plan of care and further disease process causes discussed with patient, patient encouraged to keep all follow up appointments. Patient verbalized understanding.

## 2022-05-25 NOTE — PROGRESS NOTES
Date:  2022  Time:  10:33 AM    CHF Clinic at [facility]    Office: [phone] Fax: [phone]    Re:  Flower Melton   Patient : 1980    Vital Signs: BP (!) 170/104   Pulse 92   Resp 20   Wt 278 lb 6.4 oz (126.3 kg)   SpO2 94%   BMI 33.89 kg/m²                       O2 Device: None (Room air)                           No results for input(s): CBC, HGB, HCT, WBC, PLATELET, NA, K, CL, CO2, BUN, CREATININE, GLUCOSE, BNP, INR in the last 72 hours. Respiratory:    Assessment  Charting Type: Reassessment    Breath Sounds  Right Upper Lobe: Clear  Right Middle Lobe: Clear  Right Lower Lobe: Clear  Left Upper Lobe: Clear  Left Lower Lobe: Clear    Cough/Sputum  Cough: Productive  Frequency: Occasional  Sputum Amount: Small  Sputum Color: Tan         Peripheral Vascular  RLE Edema: None  LLE Edema: None      Complaints: None at this time    Physician Orders None    Comment : Arrived for scheduled visit per ambulatory. His weight is down 1.6 lbs from last month. Denies any increase in dyspnea with exertion or chest pain. Ongoing fatigue throughout the day. Blood pressure elevated on arrival 190/120. Rechecked after patient rested for 10 minutes, 170/104. Says he just took meds shortly before coming to clinic appt. Medication list reviewed and updated. Admits to missing doses of medication especially while he was out of town recently. Reinforced the importance of taking medications as ordered and he verbalized understanding. Reinforced low sodium diet and fluid restrictions. Has appointment with Cardiology, Dr. Jennifer Abraham on 22. Encouraged patient to keep appt. And not miss it. No s/s acute chf at this time. Next CHF Clinic visit on 22.     Electronically signed by Valerie Vilchis RN on 2022 at 10:33 AM

## 2022-06-16 DIAGNOSIS — R91.8 LUNG NODULES: Primary | ICD-10-CM

## 2022-06-16 DIAGNOSIS — I42.6 ALCOHOLIC CARDIOMYOPATHY (HCC): ICD-10-CM

## 2022-06-29 ENCOUNTER — HOSPITAL ENCOUNTER (OUTPATIENT)
Age: 42
Discharge: HOME | End: 2022-06-29
Payer: MEDICAID

## 2022-06-29 ENCOUNTER — HOSPITAL ENCOUNTER (OUTPATIENT)
Dept: OTHER | Age: 42
Discharge: HOME | End: 2022-06-29
Payer: MEDICAID

## 2022-06-29 VITALS
OXYGEN SATURATION: 94 % | SYSTOLIC BLOOD PRESSURE: 160 MMHG | WEIGHT: 282.6 LBS | DIASTOLIC BLOOD PRESSURE: 108 MMHG | HEART RATE: 80 BPM | BODY MASS INDEX: 34.4 KG/M2 | RESPIRATION RATE: 20 BRPM

## 2022-06-29 LAB
ANION GAP SERPL CALCULATED.3IONS-SCNC: 12 MMOL/L (ref 9–17)
BUN BLDV-MCNC: 7 MG/DL (ref 6–20)
CHLORIDE BLD-SCNC: 101 MMOL/L (ref 98–107)
CO2: 22 MMOL/L (ref 20–31)
CREAT SERPL-MCNC: 0.82 MG/DL (ref 0.7–1.2)
GFR AFRICAN AMERICAN: >60 ML/MIN
GFR NON-AFRICAN AMERICAN: >60 ML/MIN
GFR SERPL CREATININE-BSD FRML MDRD: NORMAL ML/MIN/{1.73_M2}
MAGNESIUM: 1.7 MG/DL (ref 1.6–2.6)
POTASSIUM SERPL-SCNC: 3.8 MMOL/L (ref 3.7–5.3)
SODIUM BLD-SCNC: 135 MMOL/L (ref 135–144)

## 2022-06-29 PROCEDURE — 84520 ASSAY OF UREA NITROGEN: CPT

## 2022-06-29 PROCEDURE — 99213 OFFICE O/P EST LOW 20 MIN: CPT

## 2022-06-29 PROCEDURE — 82565 ASSAY OF CREATININE: CPT

## 2022-06-29 PROCEDURE — 36415 COLL VENOUS BLD VENIPUNCTURE: CPT

## 2022-06-29 PROCEDURE — 83735 ASSAY OF MAGNESIUM: CPT

## 2022-06-29 PROCEDURE — 80051 ELECTROLYTE PANEL: CPT

## 2022-06-29 NOTE — PROGRESS NOTES
Date:  2022  Time:  3:04 PM    CHF Clinic at [facility]    Office: [phone] Fax: [phone]    Re:  Sonu Melton   Patient : 1980    Vital Signs: BP (!) 160/108   Pulse 80   Resp 20   Wt 282 lb 9.6 oz (128.2 kg)   SpO2 94%   BMI 34.40 kg/m²                       O2 Device: None (Room air)                           Recent Labs     22  1210      K 3.8      CO2 22   BUN 7   CREATININE 0.82        Respiratory:    Assessment  Charting Type: Reassessment    Breath Sounds  Right Upper Lobe: Clear  Right Middle Lobe: Clear  Right Lower Lobe: Clear  Left Upper Lobe: Clear  Left Lower Lobe: Clear    Cough/Sputum  Cough: Productive  Frequency: Infrequent  Sputum Amount: Small  Sputum Color: Tan         Peripheral Vascular  RLE Edema: None  LLE Edema: None      Complaints: Some increase in SOB and fatigue        Comment : Patient here ambulatory for routine visit. Weight increased 4.5 lbs in one month. Patient recently saw Dr Danyel Billingsley 2022. Dr Danyel Billingsley increased enalapril to 2x day for elevated BP. He also increased coreg to 25 mg 2x day but patient was already taking this dose. Will notify office of this. Seeing Dr Danyel Billingsley again 2022. Long discussion about low salt diet and fluid limits. States compliance with medications. Lasix is 80 mg 1x day. Labs drawn with results above. Next visit here 6 weeks since seeing cardiology in 2 weeks. Instructed to call here if problems arise. Next appt 8/10/2022.     Electronically signed by Alejandra Rebolledo RN on 2022 at 3:04 PM

## 2022-07-08 ENCOUNTER — HOSPITAL ENCOUNTER (OUTPATIENT)
Facility: MEDICAL CENTER | Age: 42
End: 2022-07-08

## 2022-07-09 ENCOUNTER — HOSPITAL ENCOUNTER (OUTPATIENT)
Dept: CT IMAGING | Age: 42
Discharge: HOME | End: 2022-07-11
Payer: COMMERCIAL

## 2022-07-09 DIAGNOSIS — R91.8 LUNG NODULES: ICD-10-CM

## 2022-07-09 DIAGNOSIS — I42.6 ALCOHOLIC CARDIOMYOPATHY (HCC): ICD-10-CM

## 2022-07-09 PROCEDURE — 71250 CT THORAX DX C-: CPT

## 2022-07-11 ENCOUNTER — OFFICE VISIT (OUTPATIENT)
Dept: ONCOLOGY | Age: 42
End: 2022-07-11
Payer: MEDICAID

## 2022-07-11 ENCOUNTER — TELEPHONE (OUTPATIENT)
Dept: ONCOLOGY | Age: 42
End: 2022-07-11

## 2022-07-11 VITALS
TEMPERATURE: 96.9 F | BODY MASS INDEX: 34.27 KG/M2 | WEIGHT: 281.5 LBS | DIASTOLIC BLOOD PRESSURE: 90 MMHG | SYSTOLIC BLOOD PRESSURE: 137 MMHG | HEART RATE: 89 BPM | RESPIRATION RATE: 16 BRPM

## 2022-07-11 DIAGNOSIS — R91.8 LUNG NODULES: Primary | ICD-10-CM

## 2022-07-11 DIAGNOSIS — I42.6 ALCOHOLIC CARDIOMYOPATHY (HCC): ICD-10-CM

## 2022-07-11 PROCEDURE — G8417 CALC BMI ABV UP PARAM F/U: HCPCS | Performed by: INTERNAL MEDICINE

## 2022-07-11 PROCEDURE — G8427 DOCREV CUR MEDS BY ELIG CLIN: HCPCS | Performed by: INTERNAL MEDICINE

## 2022-07-11 PROCEDURE — 99211 OFF/OP EST MAY X REQ PHY/QHP: CPT

## 2022-07-11 PROCEDURE — 99214 OFFICE O/P EST MOD 30 MIN: CPT | Performed by: INTERNAL MEDICINE

## 2022-07-11 PROCEDURE — 4004F PT TOBACCO SCREEN RCVD TLK: CPT | Performed by: INTERNAL MEDICINE

## 2022-07-11 NOTE — TELEPHONE ENCOUNTER
FERMIN ARRIVES AMBULATORY FOR MD VISIT  DR Jamarcus Yip IN TO SEE PATIENT  ORDERS RECEIVED  RV IN 1 YEAR   NEED CT CHEST WO CONTRAST PRIOR  CT CHEST W/O CONTRAST DUE PRIOR TO F/U IN July 2023, PT TO Milo Mckeon GIVEN  MD VISIT DUE July 2023, AVS PRINTED AND PLACED INTO CALL FOLDER TO SCHEDULE APPT FOR MID July 2023  AVS PRINTED AND GIVEN TO PATIENT WITH INSTRUCTIONS  PATIENT DISCHARGED AMBULATORY

## 2022-07-11 NOTE — PROGRESS NOTES
DIAGNOSIS:   1. Dilated cardiomyopathy and systolic heart failure  2. Multiple lung nodules  3. History of alcoholism  CURRENT THERAPY:  1. Cardiac catheterization did not show ischemic heart disease as a cause of his cardiomyopathy  2. Surveillance     BRIEF CASE HISTORY:  The patient is a 40 y.o.   male who is admitted to the hospital for dyspnea, he is found to have systolic heart failure with severe cardiomyopathy  Also CT scan showed multiple lung nodules concerning  Pt is seen and examined  CT abd and pelvis just done, I reviewed the images, no clear mass or lesion seen, await report. Pt was relatively healthy without known medical issues. He underwent orchiectomy in 2012 due to ?torsion. Pt is seen and examined, he complains of dyspnea. No chest pain   Cardiac catheterization was negative for coronary artery disease. The patient was diagnosed with dilated cardiomyopathy likely due to previous alcoholism. We decided to follow him as an outpatient with possible biopsy of the lung nodules. INTERIM HISTORY: The patient presents for follow up to review CT and discuss further recommendations. His fatigue is unchanged and activity based, he tries to walk daily but reports it is very tiring. He is not doing any scheduled exercises. He continues frequent follow up with cardiology and has had medication adjustments for blood pressure. His weight is stable. Past Medical History:   has a past medical history of Alcohol abuse, Alcoholic cardiomyopathy ([address] CHF (congestive heart failure) ([address] Congestive heart failure ([address] Hyperlipidemia, Hypertension, Hypertensive crisis, and Testicular torsion. Past Surgical History:   has a past surgical history that includes Total shoulder arthroplasty (Left, 1995); Knee arthroscopy (Left, 2009); and Testicle removal (Left, 2012). Medications:    Reviewed in Epic     Allergies:  Patient has no known allergies.     Social History:   reports that he has been smoking cigarettes. He has a 9.00 pack-year smoking history. He has never used smokeless tobacco. He reports current alcohol use of about 2.0 standard drinks of alcohol per week. He reports current drug use. Drug: Marijuana [facility]). Family History: family history includes Diabetes in his maternal grandmother. REVIEW OF SYSTEMS:    Constitutional: No fever or chills. No night sweats, no weight loss   Eyes: No eye discharge, double vision, or eye pain   HEENT: negative for sore mouth, sore throat, hoarseness and voice change   Respiratory: dyspnea, cough. No hemoptysis   Cardiovascular: negative for chest pain, dyspnea, palpitations, orthopnea, PND   Gastrointestinal: negative for nausea, vomiting, diarrhea, constipation, abdominal pain, Dysphagia, hematemesis and hematochezia   Genitourinary: negative for frequency, dysuria, nocturia, urinary incontinence, and hematuria   Integument: negative for rash, skin lesions, bruises. Hematologic/Lymphatic: negative for easy bruising, bleeding, lymphadenopathy, or petechiae   Endocrine: negative for heat or cold intolerance,weight changes, change in bowel habits and hair loss   Musculoskeletal: negative for myalgias, arthralgias, pain, joint swelling,and bone pain   Neurological: negative for headaches, dizziness, seizures, weakness, numbness    PHYSICAL EXAM:      BP (!) 137/90   Pulse 89   Temp 96.9 °F (36.1 °C) (Temporal)   Resp 16   Wt 281 lb 8 oz (127.7 kg)   BMI 34.27 kg/m²    Temp (24hrs), Av.8 °F (36.6 °C), Min:97.4 °F (36.3 °C), Max:98.1 °F (36.7 °C)    General appearance - ill appearing.    Mental status - alert and cooperative   Eyes - pupils equal and reactive, extraocular eye movements intact   Ears - bilateral TM's and external ear canals normal   Mouth - mucous membranes moist, pharynx normal without lesions   Neck - supple, no significant adenopathy   Lymphatics - no palpable lymphadenopathy, no hepatosplenomegaly   Chest - clear, no crackles or wheezing  Heart - normal rate, regular rhythm, normal S1, S2, no murmurs  Abdomen - soft, nontender, nondistended, no masses or organomegaly   Neurological - alert, oriented, normal speech, no focal findings or movement disorder noted   Musculoskeletal - no joint tenderness, deformity or swelling   Extremities - peripheral pulses normal, no pedal edema, no clubbing or cyanosis   Skin - normal coloration and turgor, no rashes, no suspicious skin lesions noted ,    REVIEW OF LABORATORY DATA:   Lab Results   Component Value Date    WBC 6.7 02/03/2022    HGB 15.3 02/03/2022    HCT 47.0 02/03/2022    MCV 83.9 02/03/2022     02/03/2022       Chemistry        Component Value Date/Time     06/29/2022 1210    K 3.8 06/29/2022 1210     06/29/2022 1210    CO2 22 06/29/2022 1210    BUN 7 06/29/2022 1210    CREATININE 0.82 06/29/2022 1210        Component Value Date/Time    CALCIUM 9.4 02/24/2022 1231    ALKPHOS 109 01/31/2022 1302    AST 18 01/31/2022 1302    ALT 22 01/31/2022 1302    BILITOT 0.39 01/31/2022 1302              REVIEW OF RADIOLOGICAL RESULTS:  CT CHEST WO CONTRAST 7/9/2022  Multiple subcentimeter pulmonary nodule stable. No suspicious interval change. No new nodules. Redemonstrated linear atelectasis or scar right middle lobe and lingula unchanged. Cardiomegaly. IMPRESSION:   1. Systolic heart failure, dilated cardiomyopathy, unknown etiology, possibly alcoholic, coronary artery disease excluded  2. Alcoholic cirrhosis   3. Lung lesions. Multiple measuring up to 7 mm  4. Pleural effusion, likely due to CAD     PLAN:  1. We reviewed his CT of the chest which shows stable pulmonary nodules. 2. I assured him there is no suspicion for malignancy based on the findings. 3. We will plan for CT to be done in 1 year. 4. I encouraged him to resume regular activities and consider regular exercise program.   5. Return after CT to review.        Scribe Attestation   This note was created by Kenyatta Marques acting as scribe for the physician signing this note  Electronically Signed  Christel Zaidi, 7/11/2022  Scribe, [employer]. Attending Attestation   Note was reviewed and edited.   I am in agreement with the note as entered    SEVEN CARLOS [facility] MD Jaron  Hematologist/Medical [id number] [facility] hematology oncology physicians

## 2022-08-26 ENCOUNTER — HOSPITAL ENCOUNTER (OUTPATIENT)
Dept: OTHER | Age: 42
Discharge: HOME | End: 2022-08-26
Payer: MEDICAID

## 2022-08-26 VITALS
OXYGEN SATURATION: 95 % | SYSTOLIC BLOOD PRESSURE: 150 MMHG | WEIGHT: 281 LBS | HEART RATE: 74 BPM | BODY MASS INDEX: 34.2 KG/M2 | DIASTOLIC BLOOD PRESSURE: 90 MMHG | RESPIRATION RATE: 20 BRPM

## 2022-08-26 PROCEDURE — 99212 OFFICE O/P EST SF 10 MIN: CPT

## 2022-08-26 NOTE — PROGRESS NOTES
Date:  2022  Time:  10:38 AM    CHF Clinic at [facility]    Office: [phone] Fax: [phone]    Re:  Neymar Melton   Patient : 1980    Vital Signs: BP (!) 150/90   Pulse 74   Resp 20   Wt 281 lb (127.5 kg)   SpO2 95%   BMI 34.20 kg/m²                       O2 Device: None (Room air)                           No results for input(s): CBC, HGB, HCT, WBC, PLATELET, NA, K, CL, CO2, BUN, CREATININE, GLUCOSE, BNP, INR in the last 72 hours. Respiratory:    Assessment  Charting Type: Reassessment    Breath Sounds  Right Upper Lobe: Clear  Right Middle Lobe: Clear  Right Lower Lobe: Clear  Left Upper Lobe: Clear  Left Lower Lobe: Clear    Cough/Sputum  Cough: Productive  Frequency: Occasional  Sputum Amount: Small  Sputum Color: Tan         Peripheral Vascular  RLE Edema: None  LLE Edema: None      Complaints: No new complaints at this time    Physician Orders None    Comment : Arrived for scheduled visit per ambulatory. His weight is down 1.6 lbs from last month. Denies any increase in dyspnea with exertion or chest pain. No lower leg, ankle or pedal edema noted. Medication list reviewed and updated. Reinforced low sodium diet and fluid restrictions. Continues to work on quitting smoking. Has appt for 2D echo on 22 and follow up with Dr. Sharon Laurent on 22 to review echo results and need for AICD. Next chf clinic visit 22.     Electronically signed by Amari Hunter RN on 2022 at 10:38 AM

## 2022-09-23 ENCOUNTER — HOSPITAL ENCOUNTER (OUTPATIENT)
Dept: OTHER | Age: 42
Discharge: HOME | End: 2022-09-23
Payer: MEDICAID

## 2022-09-23 VITALS
WEIGHT: 279.8 LBS | BODY MASS INDEX: 34.06 KG/M2 | SYSTOLIC BLOOD PRESSURE: 140 MMHG | OXYGEN SATURATION: 97 % | DIASTOLIC BLOOD PRESSURE: 92 MMHG | HEART RATE: 80 BPM

## 2022-09-23 PROCEDURE — 99212 OFFICE O/P EST SF 10 MIN: CPT

## 2022-09-23 NOTE — PROGRESS NOTES
Date:  2022  Time:  10:23 AM    CHF Clinic at [facility]    Office: [phone] Fax: [phone]    Re:  Taran Melton   Patient : 1980    Vital Signs: BP (!) 140/92   Pulse 80   Wt 279 lb 12.8 oz (126.9 kg)   SpO2 97%   BMI 34.06 kg/m²                       O2 Device: None (Room air)                           No results for input(s): CBC, HGB, HCT, WBC, PLATELET, NA, K, CL, CO2, BUN, CREATININE, GLUCOSE, BNP, INR in the last 72 hours. Respiratory:    Assessment  Charting Type: Reassessment    Breath Sounds  Right Upper Lobe: Clear  Right Middle Lobe: Clear  Right Lower Lobe: Clear  Left Upper Lobe: Clear  Left Lower Lobe: Clear    Cough/Sputum  Cough: Non-productive       Peripheral Vascular  RLE Edema: None  LLE Edema: None    Complaints: Nothing new    Comment : Patient here ambulatory for routine visit. Weight decreased one pound in one month. No new or acute s/s CHF. States compliance with low salt diet, fluid limits and medications. Seeing cardiology next week to discuss possible ICD placement. Next visit here 4 week 10/19/2022.     Electronically signed by Alyssa Ramirez RN on 2022 at 10:23 AM

## 2022-10-18 ENCOUNTER — HOSPITAL ENCOUNTER (OUTPATIENT)
Dept: OTHER | Age: 42
Discharge: HOME | End: 2022-10-18
Payer: MEDICAID

## 2022-10-18 VITALS
BODY MASS INDEX: 33.84 KG/M2 | RESPIRATION RATE: 20 BRPM | WEIGHT: 278 LBS | SYSTOLIC BLOOD PRESSURE: 146 MMHG | DIASTOLIC BLOOD PRESSURE: 88 MMHG | OXYGEN SATURATION: 98 % | HEART RATE: 75 BPM

## 2022-10-18 PROCEDURE — 99212 OFFICE O/P EST SF 10 MIN: CPT

## 2022-10-18 NOTE — PROGRESS NOTES
Date:  10/18/2022  Time:  10:28 AM    CHF Clinic at [facility]    Office: [phone] Fax: [phone]    Re:  Jarocho Melton   Patient : 1980    Vital Signs: BP (!) 146/88 Comment: pt states he just woke up , rushing here and just took his BPmedications  Pulse 75   Resp 20   Wt 278 lb (126.1 kg)   SpO2 98%   BMI 33.84 kg/m²                       O2 Device: None (Room air)                           No results for input(s): CBC, HGB, HCT, WBC, PLATELET, NA, K, CL, CO2, BUN, CREATININE, GLUCOSE, BNP, INR in the last 72 hours. Respiratory:         Breath Sounds  Right Upper Lobe: Clear  Right Middle Lobe: Clear  Right Lower Lobe: Clear  Left Upper Lobe: Clear  Left Lower Lobe: Clear    Cough/Sputum  Cough: Dry  Frequency: Infrequent         Peripheral Vascular  RLE Edema: None  LLE Edema: None      Complaints: no complaints     Comment : Wt is down 1 lb in one month. Pt feels good. Denies increased shortness of breath, chest pain, fatigue , dizziness or any new complaint. States he's been trying to get more rest/ sleep at night. Has cut down on his smoking, and is only smoking 8 cigarettes day. Pt recently seen by Dr Rukhsana Langford in [address] [place] [address],[address] office on 22. Pt is scheduled for ICD implant next week as recent follow up echo showed EF continues low at 20 %. Medication list reviewed with pt. Current diuretic is Lasix 80 mg once daily. Dr Kelsey Cespedes note from 22 states pt should continue Norvasc 10 mg once daily. Pt unsure if he's taking this medication. He will check his medications at home and if not currently on this medication he will call [facility] office to order for pt. No acute S/S of CHF noted today. Next f/u here in one month.      Electronically signed by Kiersten Anaya RN on 10/18/2022 at 10:28 AM

## 2022-10-26 ENCOUNTER — HOSPITAL ENCOUNTER (OUTPATIENT)
Dept: CARDIAC CATH/INVASIVE PROCEDURES | Age: 42
Discharge: HOME | End: 2022-10-27
Attending: INTERNAL MEDICINE | Admitting: INTERNAL MEDICINE
Payer: MEDICAID

## 2022-10-26 ENCOUNTER — APPOINTMENT (OUTPATIENT)
Dept: GENERAL RADIOLOGY | Age: 42
End: 2022-10-26
Attending: INTERNAL MEDICINE
Payer: MEDICAID

## 2022-10-26 PROBLEM — Z95.810 S/P ICD (INTERNAL CARDIAC DEFIBRILLATOR) PROCEDURE: Status: ACTIVE | Noted: 2022-10-26

## 2022-10-26 LAB
EGFR, POC: >60 ML/MIN/1.73M2
GLUCOSE BLD-MCNC: 116 MG/DL (ref 74–100)
PLATELET # BLD: 250 K/UL (ref 138–453)
POC BUN: 9 MG/DL (ref 8–26)
POC CHLORIDE: 106 MMOL/L (ref 98–107)
POC CREATININE: 0.74 MG/DL (ref 0.51–1.19)
POC HEMATOCRIT: 48 % (ref 41–53)
POC HEMOGLOBIN: 16.4 G/DL (ref 13.5–17.5)
POC POTASSIUM: 3.8 MMOL/L (ref 3.5–4.5)
POC SODIUM: 142 MMOL/L (ref 138–146)
SARS-COV-2, RAPID: NOT DETECTED
SPECIMEN DESCRIPTION: NORMAL

## 2022-10-26 PROCEDURE — 82435 ASSAY OF BLOOD CHLORIDE: CPT

## 2022-10-26 PROCEDURE — C1892 INTRO/SHEATH,FIXED,PEEL-AWAY: HCPCS

## 2022-10-26 PROCEDURE — 84132 ASSAY OF SERUM POTASSIUM: CPT

## 2022-10-26 PROCEDURE — 6370000000 HC RX 637 (ALT 250 FOR IP): Performed by: INTERNAL MEDICINE

## 2022-10-26 PROCEDURE — 84520 ASSAY OF UREA NITROGEN: CPT

## 2022-10-26 PROCEDURE — 6360000002 HC RX W HCPCS: Performed by: INTERNAL MEDICINE

## 2022-10-26 PROCEDURE — C1894 INTRO/SHEATH, NON-LASER: HCPCS

## 2022-10-26 PROCEDURE — 99153 MOD SED SAME PHYS/QHP EA: CPT

## 2022-10-26 PROCEDURE — 99152 MOD SED SAME PHYS/QHP 5/>YRS: CPT

## 2022-10-26 PROCEDURE — 84295 ASSAY OF SERUM SODIUM: CPT

## 2022-10-26 PROCEDURE — 2500000003 HC RX 250 WO HCPCS

## 2022-10-26 PROCEDURE — 2580000003 HC RX 258: Performed by: INTERNAL MEDICINE

## 2022-10-26 PROCEDURE — 6370000000 HC RX 637 (ALT 250 FOR IP)

## 2022-10-26 PROCEDURE — 2709999900 HC NON-CHARGEABLE SUPPLY

## 2022-10-26 PROCEDURE — 82947 ASSAY GLUCOSE BLOOD QUANT: CPT

## 2022-10-26 PROCEDURE — C1722 AICD, SINGLE CHAMBER: HCPCS

## 2022-10-26 PROCEDURE — 87635 SARS-COV-2 COVID-19 AMP PRB: CPT

## 2022-10-26 PROCEDURE — 85049 AUTOMATED PLATELET COUNT: CPT

## 2022-10-26 PROCEDURE — C1889 IMPLANT/INSERT DEVICE, NOC: HCPCS

## 2022-10-26 PROCEDURE — 71045 X-RAY EXAM CHEST 1 VIEW: CPT

## 2022-10-26 PROCEDURE — 85014 HEMATOCRIT: CPT

## 2022-10-26 PROCEDURE — 82565 ASSAY OF CREATININE: CPT

## 2022-10-26 PROCEDURE — 6360000002 HC RX W HCPCS

## 2022-10-26 PROCEDURE — 7100000000 HC PACU RECOVERY - FIRST 15 MIN

## 2022-10-26 PROCEDURE — C1777 LEAD, AICD, ENDO SINGLE COIL: HCPCS

## 2022-10-26 PROCEDURE — 93005 ELECTROCARDIOGRAM TRACING: CPT | Performed by: INTERNAL MEDICINE

## 2022-10-26 PROCEDURE — 33249 INSJ/RPLCMT DEFIB W/LEAD(S): CPT

## 2022-10-26 PROCEDURE — 6370000000 HC RX 637 (ALT 250 FOR IP): Performed by: STUDENT IN AN ORGANIZED HEALTH CARE EDUCATION/TRAINING PROGRAM

## 2022-10-26 PROCEDURE — 7100000001 HC PACU RECOVERY - ADDTL 15 MIN

## 2022-10-26 RX ORDER — HYDROCHLOROTHIAZIDE 25 MG/1
25 TABLET ORAL DAILY
Status: DISCONTINUED | OUTPATIENT
Start: 2022-10-27 | End: 2022-10-27 | Stop reason: HOSPADM

## 2022-10-26 RX ORDER — SODIUM CHLORIDE 0.9 % (FLUSH) 0.9 %
5-40 SYRINGE (ML) INJECTION EVERY 12 HOURS SCHEDULED
Status: DISCONTINUED | OUTPATIENT
Start: 2022-10-26 | End: 2022-10-27 | Stop reason: HOSPADM

## 2022-10-26 RX ORDER — HYDROCHLOROTHIAZIDE 25 MG/1
25 TABLET ORAL DAILY
COMMUNITY

## 2022-10-26 RX ORDER — OXYCODONE HYDROCHLORIDE 5 MG/1
10 TABLET ORAL EVERY 4 HOURS PRN
Status: DISCONTINUED | OUTPATIENT
Start: 2022-10-26 | End: 2022-10-26

## 2022-10-26 RX ORDER — CARVEDILOL 25 MG/1
25 TABLET ORAL 2 TIMES DAILY
Status: DISCONTINUED | OUTPATIENT
Start: 2022-10-26 | End: 2022-10-27 | Stop reason: HOSPADM

## 2022-10-26 RX ORDER — ENALAPRIL MALEATE 10 MG/1
10 TABLET ORAL 2 TIMES DAILY
Status: DISCONTINUED | OUTPATIENT
Start: 2022-10-26 | End: 2022-10-27 | Stop reason: HOSPADM

## 2022-10-26 RX ORDER — OXYCODONE HYDROCHLORIDE 5 MG/1
10 TABLET ORAL EVERY 4 HOURS PRN
Status: DISCONTINUED | OUTPATIENT
Start: 2022-10-26 | End: 2022-10-27 | Stop reason: HOSPADM

## 2022-10-26 RX ORDER — AMLODIPINE BESYLATE 10 MG/1
10 TABLET ORAL DAILY
COMMUNITY

## 2022-10-26 RX ORDER — AMLODIPINE BESYLATE 10 MG/1
10 TABLET ORAL NIGHTLY
Status: DISCONTINUED | OUTPATIENT
Start: 2022-10-26 | End: 2022-10-27 | Stop reason: HOSPADM

## 2022-10-26 RX ORDER — ONDANSETRON 2 MG/ML
4 INJECTION INTRAMUSCULAR; INTRAVENOUS EVERY 6 HOURS PRN
Status: DISCONTINUED | OUTPATIENT
Start: 2022-10-26 | End: 2022-10-27 | Stop reason: HOSPADM

## 2022-10-26 RX ORDER — OXYCODONE HYDROCHLORIDE 5 MG/1
5 TABLET ORAL EVERY 4 HOURS PRN
Status: DISCONTINUED | OUTPATIENT
Start: 2022-10-26 | End: 2022-10-27 | Stop reason: HOSPADM

## 2022-10-26 RX ORDER — SODIUM CHLORIDE 9 MG/ML
INJECTION, SOLUTION INTRAVENOUS CONTINUOUS
Status: DISCONTINUED | OUTPATIENT
Start: 2022-10-26 | End: 2022-10-26

## 2022-10-26 RX ORDER — OXYCODONE HYDROCHLORIDE 5 MG/1
5 TABLET ORAL EVERY 6 HOURS PRN
Status: DISCONTINUED | OUTPATIENT
Start: 2022-10-26 | End: 2022-10-26

## 2022-10-26 RX ORDER — SODIUM CHLORIDE 0.9 % (FLUSH) 0.9 %
5-40 SYRINGE (ML) INJECTION PRN
Status: DISCONTINUED | OUTPATIENT
Start: 2022-10-26 | End: 2022-10-27 | Stop reason: HOSPADM

## 2022-10-26 RX ORDER — SODIUM CHLORIDE 9 MG/ML
INJECTION, SOLUTION INTRAVENOUS PRN
Status: DISCONTINUED | OUTPATIENT
Start: 2022-10-26 | End: 2022-10-27 | Stop reason: HOSPADM

## 2022-10-26 RX ORDER — ATORVASTATIN CALCIUM 40 MG/1
40 TABLET, FILM COATED ORAL NIGHTLY
Status: DISCONTINUED | OUTPATIENT
Start: 2022-10-26 | End: 2022-10-27 | Stop reason: HOSPADM

## 2022-10-26 RX ORDER — ACETAMINOPHEN 325 MG/1
650 TABLET ORAL EVERY 4 HOURS PRN
Status: DISCONTINUED | OUTPATIENT
Start: 2022-10-26 | End: 2022-10-27 | Stop reason: HOSPADM

## 2022-10-26 RX ORDER — FUROSEMIDE 40 MG/1
80 TABLET ORAL DAILY
Status: DISCONTINUED | OUTPATIENT
Start: 2022-10-27 | End: 2022-10-27 | Stop reason: HOSPADM

## 2022-10-26 RX ADMIN — DESMOPRESSIN ACETATE 40 MG: 0.2 TABLET ORAL at 20:03

## 2022-10-26 RX ADMIN — SODIUM CHLORIDE: 9 INJECTION, SOLUTION INTRAVENOUS at 09:57

## 2022-10-26 RX ADMIN — CARVEDILOL 25 MG: 25 TABLET, FILM COATED ORAL at 20:03

## 2022-10-26 RX ADMIN — OXYCODONE 5 MG: 5 TABLET ORAL at 16:28

## 2022-10-26 RX ADMIN — SODIUM CHLORIDE, PRESERVATIVE FREE 10 ML: 5 INJECTION INTRAVENOUS at 20:17

## 2022-10-26 RX ADMIN — AMLODIPINE BESYLATE 10 MG: 10 TABLET ORAL at 20:03

## 2022-10-26 RX ADMIN — Medication 1000 MG: at 18:56

## 2022-10-26 RX ADMIN — ENALAPRIL MALEATE 10 MG: 10 TABLET ORAL at 20:03

## 2022-10-26 RX ADMIN — OXYCODONE 10 MG: 5 TABLET ORAL at 20:14

## 2022-10-26 ASSESSMENT — PAIN SCALES - GENERAL
PAINLEVEL_OUTOF10: 9

## 2022-10-26 ASSESSMENT — PAIN DESCRIPTION - ORIENTATION: ORIENTATION: LEFT

## 2022-10-26 ASSESSMENT — PAIN DESCRIPTION - LOCATION: LOCATION: CHEST

## 2022-10-26 NOTE — H&P
[facility]  ADMISSION HISTORY AND PHYSICAL             Date:   10/26/2022  Patient name: Sixto Lopez  Date of admission:  No admission date for patient encounter. MRN:   [id number]  YOB: 1980      Reason for Admission:  Non-ischemic cardiomyopathy; s/p ICD    CHIEF COMPLAINT:  Fatigue     History Obtained From:  Patient and medical record    HISTORY OF PRESENT ILLNESS:      The patient is a 40 y.o gentleman with h/o poorly controlled HTN, previous alcoholism and non-ischemic cardiomyopathy, coming today for prophylactic ICD implantation. His LVEF has been persistently reduced, found to be 20% by f/u echo last month, despite appropriate HF and antihypertensive threrapy over the past year. He has had some fatigue, with stable NYHA FC II-III status and no recent CHF. Past Medical History:   has a past medical history of Alcohol abuse, Alcoholic cardiomyopathy ([facility] [address] CHF (congestive heart failure) ([facility] [address] Congestive heart failure ([facility] [address] Hyperlipidemia, Hypertension, Hypertensive crisis, ICD (implantable cardioverter-defibrillator) in place, and Testicular torsion. Past Surgical History:   has a past surgical history that includes Total shoulder arthroplasty (Left, 1995); Knee arthroscopy (Left, 2009); and Testicle removal (Left, 2012). Home Medications:    Prior to Admission medications    Medication Sig Start Date End Date Taking?  Authorizing Provider   amLODIPine (NORVASC) 10 MG tablet Take 10 mg by mouth daily   Yes Historical Provider, MD   hydroCHLOROthiazide (HYDRODIURIL) 25 MG tablet Take 25 mg by mouth daily   Yes Historical Provider, MD   carvedilol (COREG) 25 MG tablet Take 1 tablet by mouth 2 times daily 3/2/22   BILL De Paz - CNP   aspirin EC 81 MG EC tablet Take 1 tablet by mouth daily 2/2/22   Joseluis Bowman MD   enalapril (VASOTEC) 10 MG tablet Take 1 tablet by mouth daily  Patient taking differently: Take 10 mg by mouth 2 times daily Increased per Dr Rudolph Dagmar 6/16/2022 9/15/21   Ronnie Flores MD   atorvastatin (LIPITOR) 40 MG tablet Take 1 tablet by mouth nightly 3/5/21   Braxton Abernathy MD   furosemide (LASIX) 80 MG tablet Take 1 tablet by mouth daily 3/5/21   Braxton Abernathy MD       Allergies:  Patient has no known allergies. Social History:   reports that he has been smoking cigarettes. He has a 9.00 pack-year smoking history. He has never used smokeless tobacco. He reports current alcohol use of about 2.0 standard drinks per week. He reports current drug use. Drug: Marijuana Veldeanna Stroud). Family History:   Positive for early CAD    REVIEW OF SYSTEMS:    Constitutional: there has been no unanticipated weight loss. There's been No change in energy level, No change in activity level. Eyes: No visual changes or diplopia. No scleral icterus. ENT: No Headaches, hearing loss or vertigo. No mouth sores or sore throat. Cardiovascular: No problem  Respiratory: No previous reported problems  Gastrointestinal: No abdominal pain, appetite loss, blood in stools. No change in bowel or bladder habits. Genitourinary: No dysuria, trouble voiding, or hematuria. Musculoskeletal:  No gait disturbance, No weakness or joint complaints. Integumentary: No rash or pruritis. Neurological: No headache, diplopia, change in muscle strength, numbness or tingling. No change in gait, balance, coordination, mood, affect, memory, mentation, behavior. Psychiatric: No anxiety, or depression. Endocrine: No temperature intolerance. No excessive thirst, fluid intake, or urination. No tremor. Hematologic/Lymphatic: No abnormal bruising or bleeding, blood clots or swollen lymph nodes. Allergic/Immunologic: No nasal congestion or hives.     PHYSICAL EXAM:    Physical Examination:    BP (!) 138/93   Pulse 63   Temp 98.1 °F (36.7 °C) (Oral)   Resp 16   Ht 6' (1.829 m)   Wt 277 lb (125.6 kg)   SpO2 95%   BMI 37.57 kg/m²    Constitutional and General Appearance: alert, cooperative, no distress and appears stated age  [de-identified]: PERRL, no cervical lymphadenopathy. No masses palpable. Normal oral mucosa  Respiratory:  Normal excursion and expansion without use of accessory muscles  Resp Auscultation: Good respiratory effort. No for increased work of breathing. On auscultation: clear to auscultation bilaterally  Cardiovascular: The apical impulse is not displaced  Heart tones are crisp and normal. regular S1 and S2. Murmurs: None  Jugular venous pulsation Normal  The carotid upstroke is normal in amplitude and contour without delay or bruit  Peripheral pulses are symmetrical and full   Abdomen:  No masses or tenderness  Bowel sounds present  Extremities:   No Cyanosis or Clubbing   Lower extremity edema: Trace   Skin: Warm and dry  Neurological:  Alert and oriented. Moves all extremities well  No abnormalities of mood, affect, memory, mentation, or behavior are noted    DATA:    Diagnostics:      EKG: Sinus rhythm; non-specific ST and T wave abnormality    Labs:     CBC:   Recent Labs     10/26/22  1031        BMP:   Recent Labs     10/26/22  0945   CREATININE 0.74     BNP: No results for input(s): BNP in the last 72 hours. PT/INR: No results for input(s): PROTIME, INR in the last 72 hours. APTT:No results for input(s): APTT in the last 72 hours. CARDIAC ENZYMES:No results for input(s): CKTOTAL, CKMB, CKMBINDEX, TROPONINI in the last 72 hours. FASTING LIPID PANEL:  Lab Results   Component Value Date/Time    HDL 44 03/03/2021 05:21 AM    TRIG 166 03/03/2021 05:21 AM     LIVER PROFILE:No results for input(s): AST, ALT, LABALBU in the last 72 hours.       IMPRESSION:    Non-ischemic cardiomyopathy, currently compensated with no CHF and NYHA FC II-III status; LVEF persistently reduced at 20% by repeat echo last month  Essential HTN, currently controlled with improved medical compliance  Tobacco abuse    Patient Active Problem List   Diagnosis    Essential hypertension Current every day smoker    Marijuana smoker    Class 2 obesity due to excess calories without serious comorbidity with body mass index (BMI) of 35.0 to 35.9 in adult    Prolonged Q-T interval on ECG    Pericardial effusion    Bilateral pleural effusion    Mediastinal adenopathy    Mild dilation of ascending aorta (HCC)    Multiple pulmonary nodules    Chronic systolic (congestive) heart failure (HCC)    Alcohol abuse    Epigastric pain    Elevated brain natriuretic peptide (BNP) level    Chest pain    CHAITANYA (acute kidney injury) ([facility] [address]    Tobacco abuse    S/P ICD (internal cardiac defibrillator) procedure    Nonischemic cardiomyopathy (HCC)       RECOMMENDATIONS:  Will proceed with ICD implantation today; the nature of the problems, procedure, risks and benefits were discussed with the patient, who is agreeable to proceed. Discussed with patient and nursing.     Electronically signed by Swathi Gong MD on 10/26/2022 at 2:32 PM.  [facility]

## 2022-10-26 NOTE — PROGRESS NOTES
Received post procedure to [facility] to room 6. Assessment obtained. Restrictions reviewed with patient. Post procedure pathway initiated. Left upper chest site soft , dressing dry and intact. No hematoma noted. Family at side. Patient without complaints.

## 2022-10-26 NOTE — BRIEF OP NOTE
Brief Postoperative Note      Patient: Dane Melton  YOB: 1980  MRN: [id number]      DATE OF PROCEDURE:  10/26/22    Pre-Op Diagnosis: Non-ischemic cardiomyopathy    Post-Op Diagnosis: Same    PROCEDURE:  1)  Implantation of single-chamber ICD  2)  Conscious sedation  3)  Intraoperative lead testing    Assistant:  None    Anesthesia:  Conscious sedation; IV Versed, Fentanyl and Propofol    Estimated Blood Loss (mL):  10 mL    Complications:  None    Specimens: None      Implants:   Medtronic [place] XT VR MRI                     Medtronic [id number] lead at RVA      FINDINGS:  All impedances and thresholds were stable and WNL. DFT testing was deferred due to the severity of the cardiomyopathy. The device is programmed in single-zone tachyarrhythmia detection, with rate-cut off of 188 bpm with 30/40 intervals for detection. All shock therapies are programmed to maximal energy of 40 J, with sunil pacing programmed in VVI mode at 40 ppm.    PLAN:  Discharge home tomorrow after CXR and ICD bedside evaluation. Pt will follow-up at [facility] in one week and again at 4 weeks.     Electronically signed by Nanda Shukla MD on 10/26/2022 at 2:41 PM

## 2022-10-27 ENCOUNTER — APPOINTMENT (OUTPATIENT)
Dept: GENERAL RADIOLOGY | Age: 42
End: 2022-10-27
Attending: INTERNAL MEDICINE
Payer: MEDICAID

## 2022-10-27 VITALS
SYSTOLIC BLOOD PRESSURE: 139 MMHG | HEART RATE: 67 BPM | TEMPERATURE: 98.5 F | BODY MASS INDEX: 37.52 KG/M2 | HEIGHT: 72 IN | RESPIRATION RATE: 12 BRPM | DIASTOLIC BLOOD PRESSURE: 84 MMHG | WEIGHT: 277 LBS | OXYGEN SATURATION: 96 %

## 2022-10-27 LAB
ANION GAP SERPL CALCULATED.3IONS-SCNC: 10 MMOL/L (ref 9–17)
BUN BLDV-MCNC: 8 MG/DL (ref 6–20)
CALCIUM SERPL-MCNC: 8.4 MG/DL (ref 8.6–10.4)
CHLORIDE BLD-SCNC: 102 MMOL/L (ref 98–107)
CO2: 23 MMOL/L (ref 20–31)
CREAT SERPL-MCNC: 0.69 MG/DL (ref 0.7–1.2)
EKG ATRIAL RATE: 62 BPM
EKG ATRIAL RATE: 65 BPM
EKG P AXIS: -8 DEGREES
EKG P AXIS: 83 DEGREES
EKG P-R INTERVAL: 186 MS
EKG P-R INTERVAL: 190 MS
EKG Q-T INTERVAL: 438 MS
EKG Q-T INTERVAL: 442 MS
EKG QRS DURATION: 116 MS
EKG QRS DURATION: 122 MS
EKG QTC CALCULATION (BAZETT): 444 MS
EKG QTC CALCULATION (BAZETT): 459 MS
EKG R AXIS: -30 DEGREES
EKG R AXIS: 89 DEGREES
EKG T AXIS: -11 DEGREES
EKG T AXIS: 89 DEGREES
EKG VENTRICULAR RATE: 62 BPM
EKG VENTRICULAR RATE: 65 BPM
GFR SERPL CREATININE-BSD FRML MDRD: >60 ML/MIN/1.73M2
GLUCOSE BLD-MCNC: 116 MG/DL (ref 70–99)
HCT VFR BLD CALC: 43.8 % (ref 40.7–50.3)
HEMOGLOBIN: 14.3 G/DL (ref 13–17)
MAGNESIUM: 1.9 MG/DL (ref 1.6–2.6)
MCH RBC QN AUTO: 26.9 PG (ref 25.2–33.5)
MCHC RBC AUTO-ENTMCNC: 32.6 G/DL (ref 28.4–34.8)
MCV RBC AUTO: 82.3 FL (ref 82.6–102.9)
NRBC AUTOMATED: 0 PER 100 WBC
PDW BLD-RTO: 16.4 % (ref 11.8–14.4)
PLATELET # BLD: 226 K/UL (ref 138–453)
PMV BLD AUTO: 10 FL (ref 8.1–13.5)
POTASSIUM SERPL-SCNC: 3.7 MMOL/L (ref 3.7–5.3)
RBC # BLD: 5.32 M/UL (ref 4.21–5.77)
SODIUM BLD-SCNC: 135 MMOL/L (ref 135–144)
WBC # BLD: 8.6 K/UL (ref 3.5–11.3)

## 2022-10-27 PROCEDURE — 93010 ELECTROCARDIOGRAM REPORT: CPT | Performed by: INTERNAL MEDICINE

## 2022-10-27 PROCEDURE — 6360000002 HC RX W HCPCS: Performed by: INTERNAL MEDICINE

## 2022-10-27 PROCEDURE — 93005 ELECTROCARDIOGRAM TRACING: CPT | Performed by: INTERNAL MEDICINE

## 2022-10-27 PROCEDURE — 6370000000 HC RX 637 (ALT 250 FOR IP): Performed by: STUDENT IN AN ORGANIZED HEALTH CARE EDUCATION/TRAINING PROGRAM

## 2022-10-27 PROCEDURE — 6360000002 HC RX W HCPCS: Performed by: STUDENT IN AN ORGANIZED HEALTH CARE EDUCATION/TRAINING PROGRAM

## 2022-10-27 PROCEDURE — 83735 ASSAY OF MAGNESIUM: CPT

## 2022-10-27 PROCEDURE — 6370000000 HC RX 637 (ALT 250 FOR IP): Performed by: INTERNAL MEDICINE

## 2022-10-27 PROCEDURE — 36415 COLL VENOUS BLD VENIPUNCTURE: CPT

## 2022-10-27 PROCEDURE — 80048 BASIC METABOLIC PNL TOTAL CA: CPT

## 2022-10-27 PROCEDURE — 2580000003 HC RX 258: Performed by: INTERNAL MEDICINE

## 2022-10-27 PROCEDURE — 85027 COMPLETE CBC AUTOMATED: CPT

## 2022-10-27 PROCEDURE — 71045 X-RAY EXAM CHEST 1 VIEW: CPT

## 2022-10-27 RX ORDER — HYDRALAZINE HYDROCHLORIDE 20 MG/ML
10 INJECTION INTRAMUSCULAR; INTRAVENOUS EVERY 4 HOURS PRN
Status: DISCONTINUED | OUTPATIENT
Start: 2022-10-27 | End: 2022-10-27 | Stop reason: HOSPADM

## 2022-10-27 RX ORDER — ENALAPRIL MALEATE 10 MG/1
10 TABLET ORAL 2 TIMES DAILY
Qty: 60 TABLET | Refills: 5 | Status: SHIPPED | OUTPATIENT
Start: 2022-10-27

## 2022-10-27 RX ADMIN — SODIUM CHLORIDE, PRESERVATIVE FREE 10 ML: 5 INJECTION INTRAVENOUS at 08:46

## 2022-10-27 RX ADMIN — OXYCODONE 10 MG: 5 TABLET ORAL at 13:10

## 2022-10-27 RX ADMIN — OXYCODONE 10 MG: 5 TABLET ORAL at 08:45

## 2022-10-27 RX ADMIN — OXYCODONE 10 MG: 5 TABLET ORAL at 00:23

## 2022-10-27 RX ADMIN — Medication 1000 MG: at 02:04

## 2022-10-27 RX ADMIN — FUROSEMIDE 80 MG: 40 TABLET ORAL at 08:45

## 2022-10-27 RX ADMIN — CARVEDILOL 25 MG: 25 TABLET, FILM COATED ORAL at 08:45

## 2022-10-27 RX ADMIN — ENALAPRIL MALEATE 10 MG: 10 TABLET ORAL at 08:45

## 2022-10-27 RX ADMIN — HYDROCHLOROTHIAZIDE 25 MG: 25 TABLET ORAL at 08:45

## 2022-10-27 RX ADMIN — HYDRALAZINE HYDROCHLORIDE 10 MG: 20 INJECTION INTRAMUSCULAR; INTRAVENOUS at 02:51

## 2022-10-27 ASSESSMENT — PAIN SCALES - GENERAL
PAINLEVEL_OUTOF10: 5
PAINLEVEL_OUTOF10: 8
PAINLEVEL_OUTOF10: 7
PAINLEVEL_OUTOF10: 8
PAINLEVEL_OUTOF10: 8
PAINLEVEL_OUTOF10: 7

## 2022-10-27 NOTE — CARE COORDINATION
Case Management Assessment  Initial Evaluation    Date/Time of Evaluation: 10/27/2022 12:37 PM  Assessment Completed by: Rhett Martin RN    If patient is discharged prior to next notation, then this note serves as note for discharge by case management. Patient Name: Raul Chavez                   YOB: 1980  Diagnosis: Non-ischemic cardiomyopathy ([facility] [address] [I42.8]  S/P ICD (internal cardiac defibrillator) procedure [Z95.810]                   Date / Time: 10/26/2022  4:51 PM    Patient Admission Status: Outpatient in a bed   Readmission Risk (Low < 19, Mod (19-27), High > 27): Readmission Risk Score: 7.2 ( )    Current PCP: Olive Bailey MD  PCP verified by CM? (P) Yes    Chart Reviewed: Yes      History Provided by: (P) Patient  Patient Orientation: (P) Alert and Oriented    Patient Cognition: (P) Alert    Hospitalization in the last 30 days (Readmission):  No    If yes, Readmission Assessment in CM Navigator will be completed. Advance Directives:      Code Status: Full Code   Patient's Primary Decision Maker is:        Discharge Planning:    Patient lives with: (P) Alone Type of Home: (P) House  Primary Care Giver: (P) Self  Patient Support Systems include: (P) Family Members   Current Financial resources: (P) Medicaid  Current community resources:    Current services prior to admission: (P) None            Current DME:              Type of Home Care services:  (P) None    ADLS  Prior functional level: (P) Independent in ADLs/IADLs  Current functional level: (P) Independent in ADLs/IADLs    PT AM-PAC:   /24  OT AM-PAC:   /24    Family can provide assistance at DC: (P) Yes  Would you like Case Management to discuss the discharge plan with any other family members/significant others, and if so, who?     Plans to Return to Present Housing: (P) Yes  Other Identified Issues/Barriers to RETURNING to current housing: LUE restrictions  Potential Assistance needed at discharge: (P) N/A Potential DME:    Patient expects to discharge to: (P) [facility] for transportation at discharge: (P) Self    Financial    Payor: Dalton Ogden / Plan: Dalton Ogden / Product Type: *No Product type* /     Does insurance require precert for SNF: Yes    Potential assistance Purchasing Medications:    Meds-to-Beds request: Yes      [id number] - [address] [address]  Phone: [phone] Fax: [phone]      Notes:    Factors facilitating achievement of predicted outcomes: Family support, Motivated, Cooperative, Pleasant, and Good insight into deficits    Barriers to discharge: LUE restrictions    Additional Case Management Notes: none    The Plan for Transition of Care is related to the following treatment goals of Non-ischemic cardiomyopathy ([facility] [address] [I42.8]  S/P ICD (internal cardiac defibrillator) procedure [H87.534]    IF APPLICABLE: The Patient and/or patient representative Dane and his family were provided with a choice of provider and agrees with the discharge plan. Freedom of choice list with basic dialogue that supports the patient's individualized plan of care/goals and shares the quality data associated with the providers was provided to:     Patient Representative Name:       The Patient and/or Patient Representative Agree with the Discharge Plan?       Peyton Bullock RN  Case Management Department  Ph: [phone] Fax:

## 2022-10-27 NOTE — PLAN OF CARE
Problem: Chronic Conditions and Co-morbidities  Goal: Patient's chronic conditions and co-morbidity symptoms are monitored and maintained or improved  Recent Flowsheet Documentation  Taken 10/27/2022 0800 by Nancy Benitez 34 - Patient's Chronic Conditions and Co-Morbidity Symptoms are Monitored and Maintained or Improved: Monitor and assess patient's chronic conditions and comorbid symptoms for stability, deterioration, or improvement  10/26/2022 2045 by Dee Dumont RN  Outcome: Progressing     Problem: Discharge Planning  Goal: Discharge to home or other facility with appropriate resources  Recent Flowsheet Documentation  Taken 10/27/2022 0800 by Eugene Emmanuel RN  Discharge to home or other facility with appropriate resources: Identify barriers to discharge with patient and caregiver  10/26/2022 2045 by Dee Dumont RN  Outcome: Progressing     Problem: ABCDS Injury Assessment  Goal: Absence of physical injury  10/26/2022 2045 by Dee Dumont RN  Outcome: Progressing     Problem: Pain  Goal: Verbalizes/displays adequate comfort level or baseline comfort level  10/26/2022 2045 by Dee Dumont RN  Outcome: Progressing

## 2022-10-27 NOTE — PROGRESS NOTES
Congestive Heart Failure Education completed and charted. CHF booklet given. Patient was receptive to education. Discussed the  importance of medication compliance. Discussed the importance of a heart healthy diet. Discussed 2000 mg sodium-restricted daily diet. Patient instructed to limit fluid intake to  1.5 to 2 liters per day. Patient instructed to weigh self at the same time of each day each morning, reinforced teaching to monitor for 3-5 lb weight increase over 1-2 days notify physician if change noted. Signs and symptoms of CHF discussed with patient, such as feeling more tired than normal, feeling short of breath, coughing that increases when lying down, sudden weight gain, swelling of the feet, legs or belly. Patient verbalized understanding to notify physician office if these symptoms occur.   EF 25-30 %   Pt is established with  CHF Clinic  Appt will be made for pt following this admission

## 2022-10-27 NOTE — PLAN OF CARE
Problem: Chronic Conditions and Co-morbidities  Goal: Patient's chronic conditions and co-morbidity symptoms are monitored and maintained or improved  Outcome: Progressing     Problem: Discharge Planning  Goal: Discharge to home or other facility with appropriate resources  Outcome: Progressing     Problem: ABCDS Injury Assessment  Goal: Absence of physical injury  Outcome: Progressing     Problem: Pain  Goal: Verbalizes/displays adequate comfort level or baseline comfort level  Outcome: Progressing

## 2022-10-27 NOTE — PROGRESS NOTES
Reviewed discharge instructions with the pt and mother at bedside, removed telemetry and IV. Pt dressed self and gathered all belongings. Mom driving patient home, answered all questions.

## 2022-10-27 NOTE — PROGRESS NOTES
SPIRITUAL CARE DEPARTMENT - [address]  PROGRESS NOTE    Shift date: 10.27.2022  Shift day: Thursday   Shift # 2    Room # 2004/2004-01   Name: Jackie Jerry                Pentecostal: unknown   [facility] of [facility]: unknown    Referral: Routine Visit    Admit Date & Time: 10/26/2022  4:51 PM    Assessment:  Jackie Jerry is a 40 y.o. male in the hospital and family is trying to find him.  was not able to locate patient's room and then determined that the patient had been discharged. Family appear to be calm and coping. Expressed gratitude for assistance. Intervention:  Writer introduced self and title as  Writer offered space for the family  to express feelings, needs, and concerns and provided a ministry presence. Outcome:  Family expressed gratitude. Plan:  Chaplains will remain available to offer spiritual and emotional support as needed.       Electronically signed by Christopher Sol on 10/27/2022 at 7:02 PM.  [facility]  [phone]       10/27/22 1901   Encounter Summary   Service Provided For: Significant other   Referral/Consult From: [facility] Family members   Last Encounter  10/27/22   Complexity of Encounter Moderate   Begin Time 1850   End Time  1900   Total Time Calculated 10 min   Encounter    Type Initial Screen/Assessment   Assessment/Intervention/Outcome   Assessment Calm;Coping   Intervention Active listening   Outcome Expressed Gratitude     Electronically signed by Margarito Villegas on 10/27/2022 at 7:02 PM

## 2022-10-27 NOTE — DISCHARGE SUMMARY
[facility]  Discharge Note                 Name:  Xiomara Freire  YOB: 1980  Social Security Number:  [id number]  Medical Record Number:  [id number]    Date of Admission:  10/26/2022  Date of Discharge:  10/27/2022    Admitting physician: Ruth Ann Nolan MD    Discharge Attending: BILL Ring NP, CNP  Primary Care Physician: Juno Abel MD  Consultants: none  Discharge to home in stable condition     HOSPITAL ADMISSION PROBLEM LIST:  Patient Active Problem List   Diagnosis    Essential hypertension    Current every day smoker    Marijuana smoker    Class 2 obesity due to excess calories without serious comorbidity with body mass index (BMI) of 35.0 to 35.9 in adult    Prolonged Q-T interval on ECG    Pericardial effusion    Bilateral pleural effusion    Mediastinal adenopathy    Mild dilation of ascending aorta (HCC)    Multiple pulmonary nodules    Chronic systolic (congestive) heart failure (HCC)    Alcohol abuse    Epigastric pain    Elevated brain natriuretic peptide (BNP) level    Chest pain    CHAITANYA (acute kidney injury) ([address]    Tobacco abuse    S/P ICD (internal cardiac defibrillator) procedure    Nonischemic cardiomyopathy ([address]         Procedures:  1)  Implantation of single-chamber ICD  2)  Conscious sedation  3)  Intraoperative lead testing       HOSPITAL COURSE :           The patient was admitted for:  Implantation of single-chamber ICD   Hospital Procedures if any:  Implantation of single-chamber ICD   Medications changes recommendation: see list   Follow Up Plan: one week for wound check and then in one month with cardiology       Discharge exam:   Vitals:    10/27/22 0837   BP: 139/84   Pulse: 67   Resp: 12   Temp: 98.5 °F (36.9 °C)   SpO2: 96%     Neuro: normal  Chest: Clear to ausculation. No wheezing. Cardiac: Regular rate. s1 and s2 auscultated. No murmur noted.  Left chest inscion with steri strips intact  no hematoma or bleeding noted Abdomen/groin: soft, non-tender, without masses or organomegaly  Lower extremity edema: none    Discharge Medications:     Medication List      CONTINUE taking these medications     amLODIPine 10 MG tablet; Commonly known as: NORVASC   aspirin EC 81 MG EC tablet; Take 1 tablet by mouth daily   atorvastatin 40 MG tablet; Commonly known as: LIPITOR; Take 1 tablet by   mouth nightly   carvedilol 25 MG tablet; Commonly known as: Coreg; Take 1 tablet by   mouth 2 times daily   enalapril 10 MG tablet; Commonly known as: VASOTEC; Take 1 tablet by   mouth 2 times daily Increased per Dr Harleen Waldron 6/16/2022   furosemide 80 MG tablet; Commonly known as: Lasix; Take 1 tablet by   mouth daily   hydroCHLOROthiazide 25 MG tablet; Commonly known as: HYDRODIURIL          CXR 10/27/2022   FINDINGS:   No evidence of pneumothorax status post left subclavian AICD placement. Postop changes left shoulder. Stable cardiomegaly without evidence of   vascular congestion. Lungs remain essentially clear. No new focal   infiltrates are seen. No significant pleural effusions. Impression:     No evidence of pneumothorax status post left subclavian cardiac device   placement           DATE OF PROCEDURE:  10/26/22     Pre-Op Diagnosis: Non-ischemic cardiomyopathy     Post-Op Diagnosis: Same     PROCEDURE:  1)  Implantation of single-chamber ICD  2)  Conscious sedation  3)  Intraoperative lead testing     Assistant:  None     Anesthesia:  Conscious sedation; IV Versed, Fentanyl and Propofol     Estimated Blood Loss (mL):  10 mL     Complications:  None     Specimens: None        Implants:   Medtronic [place] XT VR MRI                     Medtronic [id number] lead at [facility]VA        FINDINGS:  All impedances and thresholds were stable and WNL. DFT testing was deferred due to the severity of the cardiomyopathy. The device is programmed in single-zone tachyarrhythmia detection, with rate-cut off of 188 bpm with 30/40 intervals for detection.   All shock therapies are programmed to maximal energy of 40 J, with sunil pacing programmed in VVI mode at 40 ppm.     PLAN:  Discharge home tomorrow after CXR and ICD bedside evaluation. Pt will follow-up at [facility] in one week and again at 4 weeks. Electronically signed by Ahmet Alba MD on 10/26/2022 at 2:41 PM      Discussed with patient and nursing. Medications and discharge instructions reviewed with patient and nursing. Patient instructed to no showering or get the wound wet for 1 week, no driving for 1 week, no lifting more than 10 pounds for 4 weeks, no arm raising above the shoulder for 4 weeks.  No lovenox or heparin at any dose is to be given        Electronically signed by BILL Validvia NP on 10/27/2022 at 3:18 PM  [facility]      [phone]

## 2022-11-07 NOTE — PROCEDURES
[address]                            CARDIAC CATHETERIZATION    PATIENT NAME: Hero Mendoza                   :        1980  MED REC NO:   [id number]                             ROOM:         ACCOUNT NO:   [de-identified]                           ADMIT DATE: 10/26/2022  PROVIDER:     Pau Valencia MD    DATE OF PROCEDURE:  10/26/2022    PROCEDURES PERFORMED:  1. Implantation of single chamber ICD. 2.  Intraoperative lead testing. 3.  Conscious sedation. PREOPERATIVE DIAGNOSIS:  Cardiomyopathy, nonischemic. POSTOPERATIVE DIAGNOSIS:  Cardiomyopathy, nonischemic. PULSE GENERATOR:  Medtronic Summit XT VR MRI, serial number [id number],  implanted 10/26/2022. LEAD:  The right ventricular lead is Medtronic [id number], serial number  [id number], implanted 10/26/2022. ACCESS:  Left subclavian vein. COMPLICATIONS:  None. EBL:  10 mL. PROCEDURE:  After informed consent was obtained, the patient was brought  to the catheterization laboratory in the fasting, unsedated state. He  was placed on the fluoroscopy table whereupon he was prepped and draped  in sterile fashion for left pectoral device implantation. Conscious sedation was administered in the form of IV Versed, fentanyl,  and propofol and he remained stable during the case with O2 saturations  never falling below 90%. A total of 20 mL of 1% aqueous lidocaine with epinephrine was used to  infiltrate subcutaneous tissues of the left infraclavicular fossa. Using a standard 15-scalpel blade, a 2.5-inch incision was developed  parallel to the left clavicle and approximately 1 inch inferior to it. The depth of the incision was carried down to the plane of the  prepectoral fascia using blunt dissection and Bovie cautery.   An  additional 40 mL of 1% aqueous lidocaine with epinephrine was then used  to infiltrate subcutaneous tissues along the plane of the prepectoral  fascia, spanning approximately 6 cm inferior to the incision. Along  this plane, blunt dissection and Bovie cautery were used to create a  subcutaneous pocket. The pocket was then irrigated in antibiotic saline  solution and hemostasis was maintained with Bovie cautery. Using an 18-gauge Cook needle with modified Seldinger technique, a  single puncture was made in the left subclavian vein with easy passage  of a 0.038-inch J guidewire through the puncture site. Guidewire  position was verified in the left subclavian vein and superior vena cava  on fluoroscopic view, and over the guidewire, a 9-Occitan introducer  sheath was then advanced under fluoroscopic view into the left  subclavian vein. From this sheath, guidewire and obturator core were  then removed with good blood return observed. Through this sheath, a  Medtronic [id number] lead was advanced to the right ventricular apex where  good R-wave sensing was obtained. The lead was then actively fixed to  the ventricular myocardium using 12 clockwise turns with the Medtronic  torque tool over the connector pin of the lead. Deployment of the  active fixation mechanism was observed under fluoroscopic view, and as  the stylet was carefully removed from the lead under fluoroscopic view,  the lead was then judged to be firmly fixed to the ventricular  myocardium. Pacing and sensing thresholds and impedances were then  repeated with the ArtusLabstronic pacing systems analyzer. All were found to  be stable and within normal range. The lead was stable with cough and  deep inspiration. There was no evidence of any diaphragmatic  stimulation at 10 volt pacing. The sheath was peeled away and discarded  and the lead was fixed to the underlying pectoralis muscle using 3-0  silk sutures over the suture sleeve of the lead. The pocket was again  irrigated in antibiotic saline solution and hemostasis was verified.    Connector pins of the lead were wiped clean and dry and were placed into  the appropriate header ports of the new device, Medtronic Cobalt XT VR. All set screws were then carefully fastened over each connector pin with  Medtronic torque wrench over each set screw, and each connector pin was  then judged to be firmly fixed inside the header port of the device by  virtue of firm traction placed upon each pin. At this point,  appropriate demand ventricular pacing was observed on monitor indicating  normal device hookup. The device was carefully placed in the  subcutaneous pocket with etching facing outward using a Medtronic Tyrex  antibiotic mesh pouch. The pocket was then closed in a deep layer of  interrupted 2-0 Vicryl suture, an intermediate layer of interrupted 3-0  Vicryl suture, and a superficial layer of running 4-0 Vicryl  subcuticular suture. The pocket was then dressed in Steri-Strips and  sterile gauze and final parameter settings were then verified via  telemetry with the Medtronic representative present. Defibrillation  testing was not undertaken due to the severity of the patient's  cardiomyopathy with inherent risks of fibrillation induction. He was taken to the recovery area in good condition where findings were  discussed with the patient and his family. FINDINGS:  1. In the right ventricle, after device hookup, pacing threshold was  0.5 volts at 0.5 milliseconds with a lead impedance of 681 ohms and  R-wave of 9.2 mV. 2.  The final parameter settings show a single zone tachyarrhythmia  detection with a rate cutoff of 188 beats per minute with all shock  energies programmed to maximal energy at 40 joules in reversed polarity. Cleatis Roche pacing is set VVI at 40 with an output of 3.5 volts at 0.4  milliseconds with a sensitivity in the right ventricle of 0.3 mV.         Valencia Bell MD    D: 11/07/2022 9:53:36       T: 11/07/2022 9:57:17     [email or web address]  Job#: [id number]     Doc#: [id number]    CC:  Rosalia Cook Jeremy Coyne MD

## 2022-11-15 ENCOUNTER — HOSPITAL ENCOUNTER (OUTPATIENT)
Dept: OTHER | Age: 42
Discharge: HOME | End: 2022-11-15
Payer: MEDICAID

## 2022-11-15 VITALS
WEIGHT: 277.6 LBS | OXYGEN SATURATION: 96 % | DIASTOLIC BLOOD PRESSURE: 84 MMHG | RESPIRATION RATE: 20 BRPM | BODY MASS INDEX: 37.65 KG/M2 | HEART RATE: 78 BPM | SYSTOLIC BLOOD PRESSURE: 140 MMHG

## 2022-11-15 PROCEDURE — 99212 OFFICE O/P EST SF 10 MIN: CPT

## 2022-11-15 NOTE — PROGRESS NOTES
Date:  11/15/2022  Time:  11:26 AM    CHF Clinic at [facility]    Office: [phone] Fax: [phone]    Re:  Kale Melton   Patient : 1980    Vital Signs: BP (!) 140/84   Pulse 78   Resp 20   Wt 277 lb 9.6 oz (125.9 kg)   SpO2 96%   BMI 37.65 kg/m²                       O2 Device: None (Room air)                           No results for input(s): CBC, HGB, HCT, WBC, PLATELET, NA, K, CL, CO2, BUN, CREATININE, GLUCOSE, BNP, INR in the last 72 hours. Respiratory:         Breath Sounds  Right Upper Lobe: Clear  Right Middle Lobe: Clear  Right Lower Lobe: Clear  Left Upper Lobe: Clear  Left Lower Lobe: Clear    Cough/Sputum  Cough: None         Peripheral Vascular  RLE Edema: None  LLE Edema: None      Complaints: no new complaints      Comment : Wt is down 1/2 lb in one month. Pt had recent NEW implant AICD placed on 10/26/22. Will wait next month to check optivol fluid index reading. Left anterior chest AICD incision site clean dry without redness, drainage, swelling noted. Pt states he has some mild incisional pain with palpation otherwise no complaints. He has been eating a lot of foods high in salt as his Mom has been in town and cooking a lot of gumbo, omelets with sausage and espinal and other foods high in salt. Pt cautioned about the high sodium content in these foods , instructed to follow 2 gm sodium diet and fluid restriction of 2 liters daily. Medications reviewed with pt. Pt has not resumed ASA 81 mg once daily yet. Discharge instructions on 10/27/22 stated pt can resume. Dmitri Jones has script for ASA waiting for him to . He will pick it up today. Pt also unsure if he is taking Amlodipine 10 mg daily. Office note from [address] in September states pt should continue taking Norvasc. [place] states pt picked up script for Norvasc on 22.  Pt stated maybe his Mom picked it up , He will double check that he has that medication and will call [facility] if he doesn't have it at home. [facility] Pharmacist also states, Pt last picked up Lasix 80 mg once daily in August 2022 ( 30 day supply) . Pt states he had some extra tablets Lasix he was using up first and also admits sometimes he skips taking the Lasix on occasion, because he has urinate so much when he takes it and sometimes he \" just forgets\". Encouraged pt to take Lasix daily as ordered, explained importance of taking Lasix to keep CHF symptoms under control,  and encouraged him  to use a reminder alarm on his phone to help him remember to take it daily. No acute S/S of CHF noted today. He has cut down on smoking , about 5 cigarettes / day now. He has a f/u with Dr Jeremy Coyne on 12/15/22 at 1 PM. Pt given appt card for that appt and for next f/u at the [address] [place] [address] on 12/20/22 at 11 a.m.     Electronically signed by Ger Laureano RN on 11/15/2022 at 11:26 AM

## 2022-12-30 ENCOUNTER — HOSPITAL ENCOUNTER (OUTPATIENT)
Dept: OTHER | Age: 42
Discharge: HOME | End: 2022-12-30
Payer: MEDICAID

## 2022-12-30 VITALS
SYSTOLIC BLOOD PRESSURE: 160 MMHG | RESPIRATION RATE: 20 BRPM | DIASTOLIC BLOOD PRESSURE: 70 MMHG | HEART RATE: 73 BPM | BODY MASS INDEX: 38.54 KG/M2 | WEIGHT: 284.2 LBS | OXYGEN SATURATION: 97 %

## 2022-12-30 PROCEDURE — 99212 OFFICE O/P EST SF 10 MIN: CPT

## 2022-12-30 NOTE — PROGRESS NOTES
Date:  2022  Time:  11:54 AM    CHF Clinic at [address] [facility]    Office: [phone] Fax: [phone]    Re:  Odilon Melton   Patient : 1980    Vital Signs: BP (!) 160/70 Comment: just took his AM medications an hour ago  Pulse 73   Resp 20   Wt 284 lb 3.2 oz (128.9 kg)   SpO2 97%   BMI 38.54 kg/m²                                                   No results for input(s): CBC, HGB, HCT, WBC, PLATELET, NA, K, CL, CO2, BUN, CREATININE, GLUCOSE, BNP, INR in the last 72 hours. Respiratory:         Breath Sounds  Right Upper Lobe: Clear  Right Middle Lobe: Clear  Right Lower Lobe: Clear, Diminished  Left Upper Lobe: Clear  Left Lower Lobe: Clear, Diminished    Cough/Sputum  Cough: None         Peripheral Vascular  RLE Edema: Trace  LLE Edema: Trace      Complaints: no new complaints. Noncompliant with diet  and taking diuretic over the holidays    Comment : Wt is up 6.6 lbs in one month. He denies increased shortness of breath, dizziness, chest pain, or fatigue. Pt states \" I've been pigging out over  and \". Eating a lot of fried chicken, salty foods, butter with seafood. He also admits to skipping at least 4 dose of daily Lasix 80 mg once daily in the past couple weeks. Pt reminded of the importance of taking diuretics every day without skipping doses and  following low 2 gm sodium diet and fluid restriction of 2 liters daily. Pt has new  medtronic [place] XT VRI AICD device placed in 2022. The medtronic carelink/cardiosight device in [facility] Clinic does not support taking readings from this new updated device, therefore unable to obtain cardiosight reading today. Pt states he will get back on track with diet and fluid restriction. Will have pt return in 3 weeks for reassessment.          Electronically signed by Scar Callahan RN on 2022 at 11:54 AM

## 2023-01-20 ENCOUNTER — HOSPITAL ENCOUNTER (OUTPATIENT)
Dept: OTHER | Age: 43
Discharge: HOME | End: 2023-01-20
Payer: MEDICAID

## 2023-01-20 VITALS
RESPIRATION RATE: 16 BRPM | HEART RATE: 85 BPM | DIASTOLIC BLOOD PRESSURE: 90 MMHG | OXYGEN SATURATION: 97 % | SYSTOLIC BLOOD PRESSURE: 140 MMHG | WEIGHT: 284 LBS | BODY MASS INDEX: 38.52 KG/M2

## 2023-01-20 PROCEDURE — 99212 OFFICE O/P EST SF 10 MIN: CPT

## 2023-01-20 NOTE — PROGRESS NOTES
Date:  2023  Time:  10:19 AM    CHF Clinic at [facility]    Office: [phone] Fax: [phone]    Re:  Annette Melton   Patient : 1980    Vital Signs: BP (!) 140/90   Pulse 85   Resp 16   Wt 284 lb (128.8 kg)   SpO2 97%   BMI 38.52 kg/m²                                                   No results for input(s): CBC, HGB, HCT, WBC, PLATELET, NA, K, CL, CO2, BUN, CREATININE, GLUCOSE, BNP, INR in the last 72 hours. Respiratory:    Assessment  Charting Type: Reassessment    Breath Sounds  Right Upper Lobe: Clear  Right Middle Lobe: Clear  Right Lower Lobe: Clear  Left Upper Lobe: Clear  Left Lower Lobe: Clear    Cough/Sputum  Cough: None         Peripheral Vascular  RLE Edema: None  LLE Edema: None      Complaints: No new complaints     Physician Orders No new orders    Comment : Weight is same as last months visit . Denies any SOB or chest pain has been at the gym on regular basis tolerating work outs and monitoring vitals. Has only missed one day of Lasix this last month due to vacation. Discussed the importance of low sodium diet 2000mg per day and 64 ounces of fluid per day. We will see in 1 month 2023. Unable to do AICD read has new medtronic coblat implanted has appt. With Dr.Mark Chahal in March.      Electronically signed by Mele Armstrong RN on 2023 at 10:19 AM

## 2023-02-17 ENCOUNTER — TELEPHONE (OUTPATIENT)
Dept: OTHER | Age: 43
End: 2023-02-17

## 2023-02-17 NOTE — TELEPHONE ENCOUNTER
Pt no show for appt at the [facility] Clinic today. Writer phoned pt. No answer. Left voicemail for pt to call us back and reschedule appt.